# Patient Record
Sex: FEMALE
[De-identification: names, ages, dates, MRNs, and addresses within clinical notes are randomized per-mention and may not be internally consistent; named-entity substitution may affect disease eponyms.]

---

## 2018-09-22 NOTE — DIAGNOSTIC IMAGING REPORT
Exam:  Ankle and foot 3 views each



History:  Pain, fall



Comparison: None.



Findings: Cortical avulsion of the lateral distal fibula. The malleolus distal

tip avulsion.  Mature hindfoot fusion. Joint spaces preserved. No abnormal soft

tissue calcification or soft tissue defect.  



Impression:



Cortical avulsion of the lateral distal fibula may be reflective of superior

peroneal retinaculum injury.



Medial malleolus distal tip avulsion



Signed by: Dr. Andrew Palisch, M.D. on 9/22/2018 12:13 PM

## 2018-09-22 NOTE — DIAGNOSTIC IMAGING REPORT
Exam:  Right hip 2 views and AP pelvis



History:  Pain, fall



Comparison: None.



Findings: No fracture or malalignment. Joint spaces preserved. No abnormal soft

tissue calcification or soft tissue defect.  



Impression:



No acute osseous abnormality





Signed by: Dr. Andrew Palisch, M.D. on 9/22/2018 12:08 PM

## 2018-09-22 NOTE — DIAGNOSTIC IMAGING REPORT
Exam:  Right knee 3 views



History:  Pain, fall



Comparison: None.



Findings: No fracture or malalignment. Joint spaces preserved. 



Impression:



No acute osseous abnormality





Signed by: Dr. Andrew Palisch, M.D. on 9/22/2018 12:09 PM

## 2019-12-17 NOTE — DIAGNOSTIC IMAGING REPORT
EXAMINATION:  CHEST SINGLE (NOT PORTABLE)    



INDICATION: Chest pain.



COMPARISON:  None

     

FINDINGS:

TUBES and LINES:  None.



LUNGS:  Lungs are well inflated.  Lungs are clear.   There is no evidence of

pneumonia or pulmonary edema.



PLEURA:  No pleural effusion or pneumothorax.



HEART AND MEDIASTINUM:  The cardiomediastinal silhouette is unremarkable.    



BONES AND SOFT TISSUES:  No acute osseous lesion.  Status post right mastectomy

and axillary lymph node dissection. Degenerative changes of the SI joint

bilaterally.



UPPER ABDOMEN: No free air under the diaphragm.    



IMPRESSION: 

No acute thoracic abnormality.





Signed by: Dr. DASH Marie M.D. on 12/17/2019 2:41 PM

## 2019-12-18 NOTE — DISCHARGE SUMMARY
FINAL DIAGNOSES:  Atypical chest pain associated with panic attack, anxiety disorder,

and stress. 

 

SUMMARY:  The patient is a pleasant 64-year-old female with disabled, polio syndrome,

multiple lower extremities surgery when she was young, able to ambulate, but more so

recently, the patient was found to have recurrent abnormality of her previous right

breast cancer lumpectomy back in 2004.  The patient also need to have some sort of

examination of her uterus.  She is followed up by MD Grace.  The patient because of

__________, she was having more stress and having some symptoms consistent with panic

disorder.  Her lab work was otherwise unremarkable.  The patient is otherwise stable.

The chest x-ray is otherwise unremarkable.  The cardiac enzyme has been negative.  The

patient's lab work otherwise unremarkable.  She is stable.  Her lipase was 46 and LDL

was 115.  Cardiac enzymes negative and the patient is wanting to go home and I agreed.

The patient will discharge home with Xanax 0.25 mg q.6 as 

needed for chest pain.  She may follow up with MD Grace for any further workup.  The

patient is stable and she want to go home and I do agree.  The patient will discharge

home today. 

 

 

 

 

______________________________

MD JOSIANE Eden/BATOOL

D:  12/18/2019 08:32:28

T:  12/18/2019 13:00:47

Job #:  [id number]

## 2019-12-18 NOTE — PROGRESS NOTE
DATE:  12/18/2019

 

Cardiology Progress Note 

 

SUBJECTIVE:  Denies any chest pain, shortness of breath, or palpitations.  Feels
good

this morning and is requesting discharge. 

 

OBJECTIVE:  Vital signs are reviewed and stable

Gen- Alert

Neck No JVD

Chest CTAB

CV rrr normal S1/s2, no s3/s4

Abd S, BS+

Ext- No edema, warm.

 

MEDICATIONS:  Metoprolol 25 mg every 12 hours and aspirin 81 mg daily.

 

STUDIES:  Reviewed.  Potassium 3.8, creatinine 0.5.  White blood cells 5.9, 
hemoglobin

12, and platelets 251. 

 

ASSESSMENT AND PLAN:  A 64-year-old woman presents with atypical chest pain,

palpitations, labile blood pressure, chronic dyspnea.  Overnight, she has done 
well.  No 

arrhythmias noted on tele.  Okay to discharge with outpatient followup for

echocardiogram and stress test.  Continue current cardiovascular medications. 

 

 

 

______________________________

MD ANEUDY Miner/BATOOL

D:  12/18/2019 09:55:27

T:  12/18/2019 11:24:17

Job #:  [id number]

 

MTDD

## 2019-12-18 NOTE — CONSULTATION
DATE OF CONSULTATION:  12/17/2019

 

Cardiology Consultation 

 

CONSULTING PHYSICIAN:  Hernan Frost MD, Interventional Cardiology.

 

REASON FOR CONSULTATION:  Uncontrolled blood pressure.

 

HISTORY OF PRESENT ILLNESS:  Ms. Landis is a 64-year-old woman with hypertension,

dyslipidemia, morbid obesity, who presents via the ER with complaints of elevated blood

pressure reads when she was [facility] Elliot Nina.  She reports she took medications for blood pressure optimization and was

seen in the ER.  She denies any syncope, palpitations, but does endorse mild dyspnea on

exertion to strenuous activity ongoing and stable for the last several years.  She is

overall sedentary.  She did notice minimal amount of chest discomfort in the last

several weeks.  This discomfort does not seem to be affected by exertion, composition

meals, or inspiration, and is reported as constant.  There are no associated complaints. 

 

REVIEW OF SYSTEMS:

A 12-system review is negative except for as noted above.

 

ALLERGIES:  TO SULFA AND NAPROXEN.

 

PAST MEDICAL HISTORY:  Significant for hypertension and dyslipidemia.

 

SURGICAL HISTORY:  Remarkable for appendectomy.

 

SOCIAL HISTORY:  The patient does endorse history of smoking, but denies alcohol or drug

use. 

 

FAMILY HISTORY:  Noncontributory.

 

PHYSICAL EXAMINATION:

VITAL SIGNS:  Temperature 97.4, heart rate 71, respiratory rate 18, blood pressure

109/65, and O2 saturation 96%. 

GENERAL:  In no acute distress, alert, active. 

NECK:  No JVD.  No carotid bruits. 

CHEST:  Clear to auscultation. 

CARDIOVASCULAR:  Regular rate and rhythm.  Normal S1 and S2.  No S3, no S4.  No murmurs,

no rubs. 

ABDOMEN:  Soft, nontender.  Bowel sounds positive. 

EXTREMITIES:  No cyanosis, clubbing, or edema.

MEDICATIONS:  The patient is currently on metoprolol tartrate 25 mg every 12 hours and

aspirin 81 mg daily. 

 

STUDIES:  Have been reviewed.  Troponin I is 0.006 and BNP is 28.5.  Creatinine 0.8,

potassium 3.5, bicarbonate 23.  Hemoglobin 12.5, white blood cells 5.7, and platelets

281. 

 

ASSESSMENT AND PLAN:  

1. A 64-year-old woman presents with atypical symptoms of chest discomfort.

2. Chronic mild dyspnea on exertion to the setting of sedentary lifestyle, obesity,

history of smoking, hypertension, and dyslipidemia. 

3. Labile blood pressure with recent elevated beats, now low normal blood pressure

reads.  Recommend observation overnight to monitor blood pressure.  She did have some

systolic reach to 90s earlier today and therefore monitoring. 

 

DICTATION ENDS HERE

 

 

 

 

______________________________

MD ANEUDY Miner/BATOOL

D:  12/17/2019 20:46:56

T:  12/17/2019 23:33:53

Job #:  [id number]

## 2020-04-28 NOTE — XMS REPORT
Patient Summary Document

                             Created on: 2020



JULIO YOUNG

External Reference #: [id number]

: 1955

Sex: Female



Demographics





                          Address                   [address]

[place]  [address]

 

                          Home Phone                [phone]

 

                          Preferred Language        Unknown

 

                          Marital Status            Unknown

 

                          Christian Affiliation     Unknown

 

                          Race                      Unknown

 

                          Additional Race(s)        Other







 

                          Ethnic Group              Unknown





Author





                          Author                    [facility]

 

                          Organization              [facility]

 

                          Address                   Unknown

 

                          Phone                     Unavailable







Support





                Name            Relationship    Address         Phone

 

                    JOSE PRATT, TONIE PUTNAM Caregiver           [address]

[place]  [address]                 Unavailable

 

                NONSTAFF        Caregiver       Unknown         Unavailable

 

                    CARMEN TSAI     Caregiver           [facility]

Unknown                                 Unavailable

 

                    ANABELL YOUNG   Next Of Kin         [address] 

[place]  [address]                     [phone]

 

                    MYRA CLEMENTS    Caregiver           [address]

[address]

[place], [place]  [address] [phone]

 

                    DOREEN BARGER MD     Caregiver           [address] [place]  [address]                     [phone]

 

                    KATRIN, (SISTER) CURT PRS                 [address]

[place]  [address]                      [phone]

 

                    MICHELLE PICKETT      PRS                 [id number]

[place]  [address]                      [phone]

 

                    HANNAH  EDWIN     PRS                 [address]

[place]  [address]                      [phone]







Care Team Providers





                    Care Team Member Name Role                Phone

 

                    NONSTAFF            PP                  Unavailable

 

                    MYRA CLEMENTS    Unavailable         Unavailable

 

                    TONIE GARCIA  Unavailable         Unavailable







Payers





             Payer Name   Policy Type  Policy Number Effective Date Expiration D

ate

 

             Medicare A & B              [id number]  2002-10-01 00:00:00  

 

             Medicare A & B              [id number]   2002-10-01 00:00:00  







Problems





           Condition Name Condition Details Condition Category Status     Onset 

Date Resolution

Date                Last Treatment Date Treating Clinician  Comments

 

        Chest pain Chest pain Problem Active                                   

 

        Dyspnea Dyspnea Problem Active                                   

 

        Hypertension Hypertension Problem Active                                

   

 

        Weakness Weakness Problem Active                                   







Allergies, Adverse Reactions, Alerts





        Allergy Name Allergy Type Status  Severity Reaction(s) Onset Date Inacti

ve Date 

Treating Clinician                      Comments

 

           Sulfa (Sulfonamide Antibiotics) Allergy to Substance Active     Sever

e     HIVES      

2018 00:00:00                                          

 

        Sulfa (Sulfonamide Antibiotics) DA      Active  MI               00:00:00                  

 

             Naproxen     Allergy to Substance Active       Severe       TAKING 

ATENOLOL, REFUSES TO TAKE 

NAPROSYN        2014 00:00:00                                  







Medications





             Ordered Medication Name Filled Medication Name Start Date   Stop Da

te    Current 

Medication? Ordering Clinician Indication Dosage     Frequency  Signature (SIG) 

Comments                                Components

 

                          Acetaminophen With Codeine (Tylenol With Codeine #3 Ta

blet) 1 Each Tablet 

Acetaminophen With Codeine (Tylenol With Codeine #3 Tablet) 1 Each Tablet 

2018 00:00:00           Yes       Mario Tran Np           300       

          Every 4 Hours as needed 

for Pain                                             







Procedures and Interventions





                    Procedure           Date / Time Performed Performing Clinici

an

 

                    X-ray of chest, single view 2019 00:00:00 MANISH CLEMENTS







Encounters





             Start Date/Time End Date/Time Encounter Type Admission Type Attendi

[facility]       Care Department     Encounter ID

 

        2019 09:01:59 2019 09:01:59 Outpatient                 [facility]     [id number]

 

           2019 14:19:00 2019 13:46:00 Discharged Inpatient 1       

   MYRA CLEMENTS 

[facility]                    [id number]

 

        2019 09:58:11 2019 09:58:11 Outpatient                 [facility]     [id number]

 

        2019 00:00:00 2019 00:00:00 Outpatient                 [facility]     [id number]

 

        2019-10-29 00:00:00 2019-10-29 00:00:00 Outpatient                 [facility]     [id number]

 

        2019-10-16 08:37:13 2019-10-16 08:37:13 Outpatient                 [facility]     [id number]

 

        2019-10-08 09:18:04 2019-10-08 09:18:04 Outpatient                 [facility]     [id number]

 

        2019-10-08 00:00:00 2019-10-08 00:00:00 Outpatient                 [facility]     [id number]

 

        2019-10-03 10:08:25 2019-10-03 10:08:25 Outpatient                 [facility]     [id number]

 

        2019-10-03 00:00:00 2019-10-03 00:00:00 Outpatient                 [facility]     [id number]

 

        2019-10-03 00:00:00 2019-10-03 00:00:00 Outpatient                 [facility]     [id number]

 

        2019 00:00:00 2019 00:00:00 Outpatient                 [facility]     [id number]

 

        2019 12:09:06 2019 12:09:06 Outpatient                 [facility]     [id number]

 

        2019 10:50:32 2019 10:50:32 Outpatient                 [facility]     [id number]

 

        2019 00:00:00 2019 00:00:00 Outpatient                 [facility]     [id number]

 

        2019 00:00:00 2019 00:00:00 Outpatient                 [facility]     [id number]

 

        2019 00:00:00 2019 00:00:00 Outpatient                 [facility]     [id number]

 

        2019-01-15 08:52:48 2019-01-15 08:52:48 Outpatient                 [facility]     [id number]

 

        2018 09:09:56 2018 09:09:56 Outpatient                 [facility]     [id number]

 

        2018-10-29 00:00:00 2018-10-29 00:00:00 Outpatient                 [facility]     [id number]

 

        2018-10-19 00:00:00 2018-10-19 00:00:00 Outpatient                 [facility]     [id number]

 

        2018-10-15 10:15:29 2018-10-15 10:15:29 Outpatient                 [facility]     [id number]

 

             2018 11:19:00 2018 13:30:00 Departed Emergency Room 1  

          INDY GARCIA            [facility]              [id number]

 

        2018 00:00:00 2018 00:00:00 Outpatient                 [facility]     [id number]

 

        2018-09-10 00:00:00 2018-09-10 00:00:00 Outpatient                 [facility]     [id number]

 

        2018 00:00:00 2018 00:00:00 Outpatient                 [facility]     [id number]

 

        2018 00:00:00 2018 00:00:00 Outpatient                 [facility]     [id number]

 

        2018 00:00:00 2018 00:00:00 Outpatient                 [facility]     [id number]

 

        2018 09:38:22 2018 09:38:22 Outpatient                 [facility]     [id number]

 

        2018 10:10:47 2018 10:10:47 Outpatient                 [facility]     [id number]

 

        2018 08:36:48 2018 08:36:48 Outpatient                 [facility]     [id number]

 

        2018 10:54:33 2018 10:54:33 Outpatient                 [facility]     [id number]

 

        2018 00:00:00 2018 00:00:00 Outpatient                 [facility]     [id number]

 

        2018 12:09:28 2018 12:09:28 Outpatient                 [facility]     [id number]

 

        2018 00:00:00 2018 00:00:00 Outpatient                 [facility]     [id number]

 

        2018 14:44:22 2018 14:44:22 Outpatient                 [facility]     [id number]

 

        2018 00:00:00 2018 00:00:00 Outpatient                 [facility]     [id number]

 

        2018 00:00:00 2018 00:00:00 Outpatient                 [facility]     [id number]

 

        2017-10-19 00:00:00 2017-10-19 00:00:00 Outpatient                 [facility]     [id number]

 

        2017-08-10 09:17:59 2017-08-10 09:17:59 Outpatient                 [facility]     [id number]

 

        2017 08:13:37 2017 08:13:37 Outpatient                 [facility]     [id number]

 

        2017 09:23:15 2017 09:23:15 Outpatient                 [facility]     [id number]







Results





           Test Description Test Time  Test Comments Text Results Atomic Results

 Result 

Comments

 

                COMPREHENSIVE METABOLIC PANEL 2020 15:23:00               

    

 

   

 

                SODIUM (test code = NA) 143 mmol/L      136-145          

 

                POTASSIUM (test code = K) 3.5 mmol/L      3.5-5.1          

 

                CHLORIDE (test code = CL) 103 mmol/L      101-109          

 

                CARBON DIOXIDE (test code = CO2) 25.1 mmol/L     21-32          

  

 

                ANION GAP (test code = GAP) 18 mmol/L       10-20            

 

                GLUCOSE (test code = GLU) 308 mg/dL                  

 

                BLOOD UREA NITROGEN (test code = BUN) 16 mg/dL        3-21      

       

 

                CREATININE (test code = CREAT) 0.75 mg/dL      0.55-1.3         

 

                BUN/CREATININE RATIO (test code = BUN/CREA) 21.3            10-2

0            

 

                TOTAL PROTEIN (test code = PROT) 7.3 g/dL        6.5-8.4        

  

 

                ALBUMIN (test code = ALB) 3.3 g/dL        3.4-4.8          

 

                GLOBULIN (test code = GLOB) 4.0 G/DL        1-10             

 

                ALBUMIN/GLOBULIN RATIO (test code = A/G) 0.83 RATIO      0.75-1.

50        

 

                CALCIUM (test code = CA) 9.4 mg/dL       8.4-10.2         

 

                BILIRUBIN TOTAL (test code = BILT) 0.80 mg/dL      0.0-1.0      

    

 

                SGOT/AST (test code = AST) 28 U/L          6-32             

 

                SGPT/ALT (test code = ALT) 36 U/L          12-78           Note:

 Change in REFERENCE RANGE due to 

new reagent method.

 

                ALKALINE PHOSPHATASE TOTAL (test code = ALKP) 82 U/L          38

-126           





[id number] 15:23:00* 



                Test Item       Value           Reference Range Comments

 

                LIPASE (test code = LIP) 95 U/L          128-270          





COMPREHENSIVE METABOLIC [id number] 15:21:00* 



                Test Item       Value           Reference Range Comments

 

                SODIUM (test code = NA) 143 mmol/L      136-145          

 

                POTASSIUM (test code = K) 3.5 mmol/L      3.5-5.1          

 

                CHLORIDE (test code = CL) 103 mmol/L      101-109          

 

                CARBON DIOXIDE (test code = CO2) 25.1 mmol/L     21-32          

  

 

                ANION GAP (test code = GAP) 18 mmol/L       10-20            

 

                GLUCOSE (test code = GLU) 308 mg/dL                  

 

                BLOOD UREA NITROGEN (test code = BUN) 16 mg/dL        3-21      

       

 

                CREATININE (test code = CREAT) 0.75 mg/dL      0.55-1.3         

 

                BUN/CREATININE RATIO (test code = BUN/CREA) 21.3            10-2

0            

 

                TOTAL PROTEIN (test code = PROT)  gram/dL        6.4-8.2        

  

 

                ALBUMIN (test code = ALB)  g/dL           3.4-5.0          

 

                GLOBULIN (test code = GLOB)  g/dL           2.7-4.2          

 

                ALBUMIN/GLOBULIN RATIO (test code = A/G)                 0.75-1.

50        

 

                CALCIUM (test code = CA) 9.4 mg/dL       8.4-10.2         

 

                BILIRUBIN TOTAL (test code = BILT)  mg/dL          0.2-1.2      

    

 

                SGOT/AST (test code = AST)  IUnit/L        15-37            

 

                SGPT/ALT (test code = ALT)  U/L            10-69            

 

                ALKALINE PHOSPHATASE TOTAL (test code = ALKP)  IUnit/L        45

-117           





[id number] 15:21:00* 



                Test Item       Value           Reference Range Comments

 

                LIPASE (test code = LIP)  Unit/L         144-286          





- CT L-SPINE W/O [id number] 15:11:00  Name: JULIO YOUNG             
     [facility]     : 1955 Age/S: 64  / F         
[address]           Unit #: [id number]     Loc:               
[place] [address]                Phys: Emily Jones MD                       
                           Acct: [id number]  Dis Date:               Status: 
REG ER                                  PHONE #: [phone]     Exam Date: 
2020  9117                     FAX #: [phone]      Reason: L hip and 
L back pain, s/p fall                     EXAMS:                                
              CPT CODE:      [id number] CT L-SPINE W/O CONTRAST                  
 [address]                    EXAM: CT of the lumbar spine without contrast;        
      INFORMATION: Lower back pain after fall;               TECHNIQUE AND 
FINDINGS:       CT dose reduction protocol;       2.5 mm axial scans; sagittal 
and coronal reconstructions.       There is good alignment of the lumbar spine. 
     Diffuse osteoporosis.       There is moderate wedge-shaped deformity of the
L1 vertebral body with       cortical disruption along the superior endplate and
the anterior       aspect of the vertebral body and with 20% height reduction 
anteriorly.       The remainder of the lumbar vertebrae as well as T11 and 12 
are       intact.       There is significant narrowing of disc spaces T10-T11 
and to a lesser       degree L1/2 and L3/4.       Advanced degenerative changes 
of facet joints in the lower lumbar       spine with intra-articular vacuum 
phenomenon.       Paravertebral soft tissues are unremarkable.       Calcified 
plaques in the abdominal aorta and iliac arteries.       Large right renal 
calculus as described before.                 IMPRESSION:         1.  Diffuse 
osteoporosis and wedge-shaped compression fracture of L1         which is 
probably acute to subacute.  Recommend clinical correlation         and consi
deration for an MRI scan of the thoracolumbar spine.         2.  Advanced degene
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
e disc disease of the lumbar spine.         3.  Advanced facet joint arthropathy
in the lower lumbar spine.                   Location code: [facility]                  
** Electronically Signed by CLEESTE Calix **           **             on 
2020 at 1511             **                      Reported and signed by: 
Bernardo Calix M.D.       CC: Emily Jones MD; Carmen Tsai M.D.        
                                                                                
      Technologist:ANGEL MCINTYRE, RT(R),CT       CTDI:        DLP:        T
rnscb Date/Time: 2020 () t.SDR.GRW                        Orig Print D
/T: S: 2020 (413)      PAGE  1                       Signed Report       
                       - CT L-SPINE W/O [id number] 15:11:00  Name: 
JULIO YOUNG [facility]     : 
1955 Age/S: 64  / F         [address]           Unit #: [id number]
[id number]     Loc:               [place] [address]                Phys: Mike Jones MD                                                   Acct: [id number]  Di
s Date:               Status: [facility] ER                                  PHONE #: [phone]
[phone]     Exam Date: 2020  9742                     FAX #: [phone]
[phone]      Reason: L hip and L back pain, s/p fall                     EXAMS:     
                                         CPT CODE:      [id number] CT L-SPINE W/O
CONTRAST                    [address]                    EXAM: CT of the lumbar sp
ine without contrast;               INFORMATION: Lower back pain after fall;    
          TECHNIQUE AND FINDINGS:       CT dose reduction protocol;       2.5 mm
axial scans; sagittal and coronal reconstructions.       There is good alignment
of the lumbar spine.       Diffuse osteoporosis.       There is moderate wedge-
shaped deformity of the L1 vertebral body with       cortical disruption along 
the superior endplate and the anterior       aspect of the vertebral body and w
ith 20% height reduction anteriorly.       The remainder of the lumbar vertebrae
as well as T11 and 12 are       intact.       There is significant narrowing of 
disc spaces T10-T11 and to a lesser       degree L1/2 and L3/4.       Advanced 
degenerative changes of facet joints in the lower lumbar       spine with intra-
articular vacuum phenomenon.       Paravertebral soft tissues are unremarkable. 
     Calcified plaques in the abdominal aorta and iliac arteries.       Large r
ight renal calculus as described before.                 IMPRESSION:         1. 
Diffuse osteoporosis and wedge-shaped compression fracture of L1         which 
is probably acute to subacute.  Recommend clinical correlation         and consi
deration for an MRI scan of the thoracolumbar spine.         2.  Advanced degene
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
e disc disease of the lumbar spine.         3.  Advanced facet joint arthropathy
in the lower lumbar spine.                   Location code: [facility]                  
** Electronically Signed by CELESTE Calix **           **             on 
2020 at 1511             **                      Reported and signed by: 
Bernardo Calix M.D.       CC: Emily Jones MD; Carmen Tsai M.D.        
                                                                                
      Technologist:ANGEL MCINTYRE, RT(R),CT       CTDI:        DLP:        T
rnscb Date/Time: 2020 () [email or web address].GRW                        Orig Print D
/T: S: 2020 (2836)      PAGE  1                       Signed Report       
                       CBC W/AUTO [id number] 15:07:00* 



                Test Item       Value           Reference Range Comments

 

                WHITE BLOOD CELL (test code = WBC) 16.9 K/mm3      4.5-12.5     

    

 

                RED BLOOD CELL (test code = RBC) 4.29 mill/mm3   3.7-5.2        

  

 

                HEMOGLOBIN (test code = HGB) 13.4 gram/dL    11.5-15.5        

 

                HEMATOCRIT (test code = HCT) 41.6 %          36.0-46.0        

 

                MEAN CELL VOLUME (test code = MCV) 97.0 fL         80-98        

    

 

                MEAN CELL HGB (test code = MCH) 31.2 picogram   27.0-33.0       

 

 

                MEAN CELL HGB CONCETRATION (test code = MCHC) 32.2 gram/dL    33

.0-36.0        

 

                RED CELL DISTRIBUTION WIDTH (test code = RDW) 13.2 %          11

.6-16.2        

 

                RED CELL DISTRIBUTION WIDTH SD (test code = RDW-SD) 48.0 fL     

    37.0-51.0        

 

                PLATELET COUNT (test code = PLT) 246 K/mm3       150-450        

  

 

                MEAN PLATELET VOLUME (test code = MPV) 9.3 fL          6.7-11.0 

        

 

                NEUTROPHIL % (test code = NT%) 87.9 %          39.0-69.0        

 

                LYMPHOCYTE % (test code = LY%) 5.0 %           25.0-55.0        

 

                MONOCYTE % (test code = MO%) 6.3 %           0.0-10.0         

 

                EOSINOPHIL % (test code = EO%) 0.0 %           0.0-5.0          

 

                BASOPHIL % (test code = BA%) 0.4 %           0.0-1.0          

 

                NEUTROPHIL # (test code = NT#) 14.83 K/mm3     1.8-7.7          

 

                LYMPHOCYTE # (test code = LY#) 0.85 K/mm3      1.0-5.0          

 

                MONOCYTE # (test code = MO#) 1.07 K/mm3      0-0.8            

 

                EOSINOPHIL # (test code = EO#) 0.00 K/mm3      0.0-0.5          

 

                BASOPHIL # (test code = BA#) 0.06 K/mm3      0.0-0.2          

 

                MANUAL DIFF REQUIRED (test code = MDIFF) NO                     

          





- CT ABD PELVIS W/O [id number] 15:07:00  Name: JULIO YOUNG              
    [facility]     : 1955 Age/S: 64  / F         
[address]           Unit #: [id number]     Loc:               
[place] [address]                Phys: Emily Jones MD                       
                           Acct: [id number]  Dis Date:               Status: 
REG ER                                  PHONE #: [phone]     Exam Date: 
2020  5099                     FAX #: [phone]      Reason: L hip and 
L back pain, s/p fall                     EXAMS:                                
              CPT CODE:      [id number] CT ABD PELVIS W/O CONT                   
 [address]20                    EXAM: CT of the abdomen and pelvis without contrast;  
            INFORMATION: Left hip and left back pain after fall;               
TECHNIQUE AND FINDINGS:       CT dose reduction protocol;       5 mm cuts 
through the abdomen and pelvis without contrast.       Liver shows decreased 
attenuation; no focal lesions.       No abnormalities of the biliary system.    
  Atrophic pancreas.       Spleen and adrenal glands unremarkable.       A 
large, densely calcified stone is seen in the lower portion of the       right 
renal collecting system.  It measures 10 mm in diameter and 440       Hounsfield
units in density.  There is also a small parenchymal       calcification 
posteriorly in the right kidney and there is localized       parenchymal atrophy
nearby.       A 2 cm cyst is seen along the anterior aspect of the right kidney.
      The left kidney is of normal size and shape; no hydronephrosis or       
stones.       There is dilatation of the stomach.  Otherwise, no bowel       
abnormalities.       No pelvic mass lesions.       Sagittal reconstructions of 
the lumbar spine show cortical       irregularity along the anterior aspect of 
the L1 vertebral body with       20% height reduction.       Lung bases are 
clear.                 IMPRESSION:         1.  No evidence of acute traumatic ch
anges of the abdomen or pelvis.         2.  Large nonobstructing calyceal stone 
in the right kidney,         associated with focal parenchymal atrophy/scarring 
and small         parenchymal calcification.         3.  Gastric distention; cau
se is not clear.         4.  Wedge-shaped compression fracture of L1.  This may 
be acute or         subacute.  Recommend correlation with MRI.                  
Location code: [facility]                   ** Electronically Signed by CELESTE ocasio **           **             on 2020 at 150             **          
           Reported and signed by: Bernardo Calix M.D.        PAGE  1         
             Signed Report                    (CONTINUED)   Name: TARAN YOUNG                   [facility]     : 1955 Age/S: 
64  / F         [address] [facility]           Unit #: [id number]     Loc:     
         [place] [address]                Phys: Emily Jones MD              
                                    Acct: [id number]  Dis Date:               
Status: REG ER                                  PHONE #: [phone]     Exam 
Date: 2020  1455                     FAX #: [phone]      Reason: L h
ip and L back pain, s/p fall                     EXAMS:                         
                     CPT CODE:      [id number] CT ABD PELVIS W/O CONT            
        [address]               <Continued>                                       
CC: Emily Jones MD; Carmen Tsai M.D.                                    
                                                           
Technologist:ANGEL MCINTYRE, RT(R),CT       CTDI:        DLP:        Trnscb 
Date/Time: 2020 (150) t.SDR.GRW                        Orig Print D/T: S:
2020 (1350)      PAGE  2                       Signed Report              
                Creatine Kinase OW6560-78-52 07:27:00* 



                Test Item       Value           Reference Range Comments

 

                Creatine Kinase MB (test code = 50747-5) 2.60            0-5.0  

          





Troponin -00-91 07:27:00* 



                Test Item       Value           Reference Range Comments

 

                Troponin I (test code = CMI1839) 0.056           0-0.300        

  





Sodium Xtomx6498-31-21 07:21:00* 



                Test Item       Value           Reference Range Comments

 

                Sodium Level (test code = 2951-2) 138             136-145       

   





Potassium Fqffw9663-17-77 07:21:00* 



                Test Item       Value           Reference Range Comments

 

                Potassium Level (test code = 2823-3) 3.8             3.5-5.1    

      





Chloride Cjcyn2555-14-98 07:21:00* 



                Test Item       Value           Reference Range Comments

 

                Chloride Level (test code = 2075-0) 106                   

     





Carbon Dioxide Egsgn5327-49-04 07:21:00* 



                Test Item       Value           Reference Range Comments

 

                Carbon Dioxide Level (test code = 2028-9) 25              22-29 

           





Anion Xti9488-46-02 07:21:00* 



                Test Item       Value           Reference Range Comments

 

                Anion Gap (test code = 33037-3) 10.8            8-16            

 





Blood Urea Biydhhyv6867-03-64 07:21:00* 



                Test Item       Value           Reference Range Comments

 

                Blood Urea Nitrogen (test code = 3094-0) 14              7-26   

          





Zkhliitcfi5779-42-81 07:21:00* 



                Test Item       Value           Reference Range Comments

 

                Creatinine (test code = 2160-0) 0.56            0.57-1.11       

 





BUN/Creatinine Uxlou0638-41-67 07:21:00* 



                Test Item       Value           Reference Range Comments

 

                BUN/Creatinine Ratio (test code = 3097-3) 25              6-25  

           





Estimat Glomerular Filtration Glcg6210-21-64 07:21:00* 



                Test Item       Value           Reference Range Comments

 

                Estimat Glomerular Filtration Rate (test code = 115170565) > 60 

           >60              





Ranges were taken from the National Kidney Disease Education Program and the [facility] literature.Reference ranges:60 or greater: Bymquf70-90 (
for 3 consecutive months): Chronic kidney disease 15 or less: Kidney failure
Glucose Zrtev3782-65-31 07:21:00* 



                Test Item       Value           Reference Range Comments

 

                Glucose Level (test code = LGO9189) 91                    

     





Calcium Mkyjf0230-34-62 07:21:00* 



                Test Item       Value           Reference Range Comments

 

                Calcium Level (test code = 04182-1) 9.0             8.4-10.2    

     





Phosphorus Snvfm2335-60-04 07:21:00* 



                Test Item       Value           Reference Range Comments

 

                Phosphorus Level (test code = MZU3537) 3.0             2.3-4.7  

        





Magnesium Gytfv2812-02-28 07:21:00* 



                Test Item       Value           Reference Range Comments

 

                Magnesium Level (test code = 32901-2) 1.6             1.3-2.1   

       





Total Flpriijyg7411-58-07 07:21:00* 



                Test Item       Value           Reference Range Comments

 

                Total Bilirubin (test code = 1975-2) 0.4             0.2-1.2    

      





Aspartate Amino Transf (AST/SGOT)2019 07:21:00* 



                Test Item       Value           Reference Range Comments

 

                                        Aspartate Amino Transf (AST/SGOT) (test 

code = Aspartate Amino Transf 

(AST/SGOT))         21                  5-34                 





Alanine Aminotransferase (ALT/SGPT)2019 07:21:00* 



                Test Item       Value           Reference Range Comments

 

                Alanine Aminotransferase (ALT/SGPT) (test code = 1742-6) 23     

         0-55             





Total Czhygvp0084-95-64 07:21:00* 



                Test Item       Value           Reference Range Comments

 

                Total Protein (test code = 2885-2) 5.9             6.5-8.1      

    





Twaboue4825-80-19 07:21:00* 



                Test Item       Value           Reference Range Comments

 

                Albumin (test code = 1751-7) 3.3             3.5-5.0          





Ijnphlpa8278-79-29 07:21:00* 



                Test Item       Value           Reference Range Comments

 

                Globulin (test code = 93682-0) 2.6             2.3-3.5          





Albumin/Globulin Swzxi9046-89-53 07:21:00* 



                Test Item       Value           Reference Range Comments

 

                Albumin/Globulin Ratio (test code = 1759-0) 1.3             0.8-

2.0          





Alkaline Csdmlmsfjqn5074-73-90 07:21:00* 



                Test Item       Value           Reference Range Comments

 

                Alkaline Phosphatase (test code = 6768-6) 67              

           





Triglycerides Wrwnh3991-31-66 07:21:00* 



                Test Item       Value           Reference Range Comments

 

                Triglycerides Level (test code = 2571-8) 95              0-149  

          





Cholesterol Tgkqa0403-14-83 07:21:00* 



                Test Item       Value           Reference Range Comments

 

                Cholesterol Level (test code = 2093-3) 182             0-199    

        





Less than 200 mg/dL       Low Jzrb869 - 239 mg/dL           Borderline Nakq011 m
g/dl and greater     High Risk                        LDL Xfueqgkrulq3240-17-89 
07:21:00* 



                Test Item       Value           Reference Range Comments

 

                LDL Cholesterol (test code = 2089-1) 115                  

      





HDL Teltchuorem8251-00-45 07:21:00* 



                Test Item       Value           Reference Range Comments

 

                HDL Cholesterol (test code = 2085-9) 48              40-60      

      





Cholesterol/HDL Incgl3601-46-29 07:21:00* 



                Test Item       Value           Reference Range Comments

 

                Cholesterol/HDL Ratio (test code = 9830-1) 3.8             3.0-3

.6          





Creatine Hgzcmh3481-54-28 07:03:00* 



                Test Item       Value           Reference Range Comments

 

                Creatine Kinase (test code = 2157-6) 123                  

      





White Blood Civyp3174-59-15 06:30:00* 



                Test Item       Value           Reference Range Comments

 

                White Blood Count (test code = 6690-2) 5.94            4.8-10.8 

        





Red Blood Xtyqb4438-94-16 06:30:00* 



                Test Item       Value           Reference Range Comments

 

                Red Blood Count (test code = 789-8) 3.83            3.6-5.1     

     





Xxrwjtchpt7161-80-99 06:30:00* 



                Test Item       Value           Reference Range Comments

 

                Hemoglobin (test code = 46713-0) 12.2            12.0-16.0      

  





Jnxfoaykur3273-12-13 06:30:00* 



                Test Item       Value           Reference Range Comments

 

                Hematocrit (test code = 4544-3) 36.8            34.2-44.1       

 





Mean Corpuscular Cwyzao9848-66-49 06:30:00* 



                Test Item       Value           Reference Range Comments

 

                Mean Corpuscular Volume (test code = 787-2) 96.1            81-9

9            





Mean Corpuscular Ievcxjkflg7875-79-15 06:30:00* 



                Test Item       Value           Reference Range Comments

 

                Mean Corpuscular Hemoglobin (test code = 785-6) 31.9            

28-32            





Mean Corpuscular Hemoglobin Lnpsdde6224-38-79 06:30:00* 



                Test Item       Value           Reference Range Comments

 

                Mean Corpuscular Hemoglobin Concent (test code = 786-4) 33.2    

        31-35            





Red Cell Distribution Irjay7261-49-54 06:30:00* 



                Test Item       Value           Reference Range Comments

 

                Red Cell Distribution Width (test code = 02728-9) 13.8          

  11.7-14.4        





Platelet Jgxsz9359-19-81 06:30:00* 



                Test Item       Value           Reference Range Comments

 

                Platelet Count (test code = 777-3) 251             140-360      

    





Neutrophils (%) (Auto)2019 06:30:00* 



                Test Item       Value           Reference Range Comments

 

                Neutrophils (%) (Auto) (test code = 77500-8) 46.1            38.

7-80.0        





Lymphocytes (%) (Auto)2019 06:30:00* 



                Test Item       Value           Reference Range Comments

 

                Lymphocytes (%) (Auto) (test code = 736-9) 40.4            18.0-

39.1        





Monocytes (%) (Auto)2019 06:30:00* 



                Test Item       Value           Reference Range Comments

 

                Monocytes (%) (Auto) (test code = 5905-5) 8.6             4.4-11

.3         





Eosinophils (%) (Auto)2019 06:30:00* 



                Test Item       Value           Reference Range Comments

 

                Eosinophils (%) (Auto) (test code = 713-8) 3.5             0.0-6

.0          





Basophils (%) (Auto)2019 06:30:00* 



                Test Item       Value           Reference Range Comments

 

                Basophils (%) (Auto) (test code = 706-2) 1.2             0.0-1.0

          





IM GRANULOCYTES %2019 06:30:00* 



                Test Item       Value           Reference Range Comments

 

                IM GRANULOCYTES % (test code = IM GRANULOCYTES %) 0.2           

  0.0-1.0          





Neutrophils # (Auto)2019 06:30:00* 



                Test Item       Value           Reference Range Comments

 

                Neutrophils # (Auto) (test code = 751-8) 2.7             2.1-6.9

          





Lymphocytes # (Auto)2019 06:30:00* 



                Test Item       Value           Reference Range Comments

 

                Lymphocytes # (Auto) (test code = 79525-1) 2.4             1.0-3

.2          





Monocytes # (Auto)2019 06:30:00* 



                Test Item       Value           Reference Range Comments

 

                Monocytes # (Auto) (test code = 742-7) 0.5             0.2-0.8  

        





Eosinophils # (Auto)2019 06:30:00* 



                Test Item       Value           Reference Range Comments

 

                Eosinophils # (Auto) (test code = 711-2) 0.2             0.0-0.4

          





Basophils # (Auto)2019 06:30:00* 



                Test Item       Value           Reference Range Comments

 

                Basophils # (Auto) (test code = 704-7) 0.1             0.0-0.1  

        





Absolute Immature Granulocyte (floh7557-44-44 06:30:00* 



                Test Item       Value           Reference Range Comments

 

                                        Absolute Immature Granulocyte (auto (olvin

t code = Absolute Immature Granulocyte 

(auto)              0.01                0-0.1                





B-Type Natriuretic Cocrqms2809-13-17 18:29:00* 



                Test Item       Value           Reference Range Comments

 

                B-Type Natriuretic Peptide (test code = 40907-9) 28.5           

 0-100            





Wbpxtp1663-87-64 18:29:00* 



                Test Item       Value           Reference Range Comments

 

                Lipase (test code = 3040-3) 46              8-78             





Prothrombin Tyvk4104-33-48 18:03:00* 



                Test Item       Value           Reference Range Comments

 

                Prothrombin Time (test code = 5902-2) 12.9            11.9-14.5 

       





Prothromb Time International Bdhpe7233-30-13 18:03:00* 



                Test Item       Value           Reference Range Comments

 

                Prothromb Time International Ratio (test code = 6301-6) 0.92    

                         





Oral Anticoagulant Therapy INR Values:1. Low Intensity Therapy        1.5 - 2.02
. Moderate Intensity Therapy   2.0 - 3.03. High Intensity Therapy(1)    2.5 - 3.
54. High Intensity Therapy(2)    3.0 - 4.05. Panic Value INR              > 5.0
Activated Partial Thromboplast Sofz5573-69-82 18:03:00* 



                Test Item       Value           Reference Range Comments

 

                Activated Partial Thromboplast Time (test code = 08231-9) 29.2  

          23.8-35.5        





Urine FWP3259-22-28 14:57:00* 



                Test Item       Value           Reference Range Comments

 

                Urine WBC (test code = 5821-4) 6-10            0-5              





Urine RCA7757-88-10 14:57:00* 



                Test Item       Value           Reference Range Comments

 

                Urine RBC (test code = 46986-0) 6-10            0-5             

 





Urine Wedaiyaw5511-82-30 14:57:00* 



                Test Item       Value           Reference Range Comments

 

                Urine Bacteria (test code = 26534-9) RARE            NONE       

      





Urine Epithelial Sqakv8959-15-49 14:57:00* 



                Test Item       Value           Reference Range Comments

 

                Urine Epithelial Cells (test code = 78063-3) FEW             NON

E             





Urine Vaqwh2955-68-14 14:40:00* 



                Test Item       Value           Reference Range Comments

 

                Urine Color (test code = 5778-6) YELLOW          YELLOW         

  





Urine Dejkvkj6694-90-83 14:40:00* 



                Test Item       Value           Reference Range Comments

 

                Urine Clarity (test code = 84482-6) SL CLOUDY       CLEAR       

     





Urine Specific Owdkurd2082-67-96 14:40:00* 



                Test Item       Value           Reference Range Comments

 

                Urine Specific Gravity (test code = 5811-5) 1.030           1.01

0-1.025      





Urine iP5983-94-14 14:40:00* 



                Test Item       Value           Reference Range Comments

 

                Urine pH (test code = 10145-9) 6               5-7              





Urine Leukocyte Fikiggqf6070-74-46 14:40:00* 



                Test Item       Value           Reference Range Comments

 

                Urine Leukocyte Esterase (test code = 5799-2) NEGATIVE        NE

GATIVE         





Urine Orxnfeg1530-62-87 14:40:00* 



                Test Item       Value           Reference Range Comments

 

                Urine Nitrite (test code = 03111-3) NEGATIVE        NEGATIVE    

     





Urine Gwzadwb5163-54-47 14:40:00* 



                Test Item       Value           Reference Range Comments

 

                Urine Protein (test code = 5804-0) 2+              NEGATIVE     

    





Urine Glucose (UA)2019 14:40:00* 



                Test Item       Value           Reference Range Comments

 

                Urine Glucose (UA) (test code = 2349-9) NEGATIVE        NEGATIVE

         





Urine Ldnypjf2563-70-25 14:40:00* 



                Test Item       Value           Reference Range Comments

 

                Urine Ketones (test code = 17901-9) NEGATIVE        NEGATIVE    

     





Urine Xukqnfwjegwd6745-85-14 14:40:00* 



                Test Item       Value           Reference Range Comments

 

                Urine Urobilinogen (test code = 95960-4) 0.2             0.2-1  

          





Urine Ohzfpuldo5121-66-16 14:40:00* 



                Test Item       Value           Reference Range Comments

 

                Urine Bilirubin (test code = 1978-6) NEGATIVE        NEGATIVE   

      





Urine Fuiay6962-95-50 14:40:00* 



                Test Item       Value           Reference Range Comments

 

                Urine Blood (test code = 05667-2) 1+              NEGATIVE      

   





CHEST SINGLE (NOT PORTABLE)2019 14:40:00                                  
                                                   [place] [address]      Patient Name: JULIO YOUNG                                  
MR #: [id number]                     : 1955                            
      Age/Sex: 64/F  Acct #: [id number]                              Req #: 
[id number]  Adm Physician:                                                      
Ordered by: MYRA CLEMENTS MD, MD                            Report #: [id number]
       Location: ER                                      Room/Bed:              
      ___________________________________________
________________________________________________________    Procedure: [id number]
DX/CHEST SINGLE (NOT PORTABLE)  Exam Date: 19                            
Exam Time: 1416                                              REPORT STATUS: Sign
ed    EXAMINATION:  CHEST SINGLE (NOT PORTABLE)          INDICATION: Chest pain.
     COMPARISON:  None           FINDINGS:   TUBES and LINES:  None.      LUNGS:
 Lungs are well inflated.  Lungs are clear.   There is no evidence of   pneumo
juan or pulmonary edema.      PLEURA:  No pleural effusion or pneumothorax.      
HEART AND MEDIASTINUM:  The cardiomediastinal silhouette is unremarkable.       
  BONES AND SOFT TISSUES:  No acute osseous lesion.  Status post right mastecto
my   and axillary lymph node dissection. Degenerative changes of the SI joint   
bilaterally.      UPPER ABDOMEN: No free air under the diaphragm.          IMPRE
SSION:    No acute thoracic abnormality.         Signed by: Dr. DASH alegria M.D. on 2019 2:41 PM        Dictated By: ALCIDES GODWIN MD, MD  Elect
ronically Signed By: ALCIDES GODWIN MD, MD on 19 1441  Transcribed By: [facility]
RAN on 19 1441       COPY TO:   MYRA CLEMENTS         ANKLE 3 + VIEWS 
IYNMB6757-71-44 12:10:00    [place] [address]      Patient Name: JULIO YOUNG  
MR #: [id number]    : 1955 Age/Sex: 63/F  Acct #: [id number] Req #: 
[id number]  Adm Physician:     Ordered by: MARIO TRAN NP  Report #: [id number]
[id number]   Location: ER  Room/Bed:     
_______________________________________________________________________
____________________________    Procedure: [id number] DX/ANKLE 3 + VIEWS RIGHT  E
xam Date: 18                            Exam Time: 1145       REPORT STATU
S: Signed       Exam:  Ankle and foot 3 views each      History:  Pain, fall    
 Comparison: None.      Findings: Cortical avulsion of the lateral distal fibul
a. The malleolus distal   tip avulsion.  Mature hindfoot fusion. Joint spaces pr
eserved. No abnormal soft   tissue calcification or soft tissue defect.        I
mpression:      Cortical avulsion of the lateral distal fibula may be reflective
of superior   peroneal retinaculum injury.      Medial malleolus distal tip avu
lsion      Signed by: Dr. Andrew Palisch, M.D. on 2018 12:13 PM        Dict
ated By: ANDREW R PALISCH MD  Electronically Signed By: ANDREW R PALISCH MD on 0
18 1213  Transcribed By: ROMEO on 18 1213       COPY TO:   SALAS TRAN NP        FOOT RIGHT KVFDJJYU8694-14-73 12:10:00    [place] [address]      
Patient Name: JULIO YOUNG   MR #: [id number]    : 1955 Age/Sex: 
63/F  Acct #: [id number] Req #: [id number]  Adm Physician:     Ordered by: 
MARIO TRAN NP  Report #: [id number]   Location: ER  Room/Bed:     
_______________________________________________________________________
____________________________    Procedure: [id number] DX/FOOT RIGHT COMPLETE  Exa
m Date: 18                            Exam Time: 1145       REPORT STATUS:
Signed       Exam:  Ankle and foot 3 views each      History:  Pain, fall      
Comparison: None.      Findings: Cortical avulsion of the lateral distal fibula.
The malleolus distal   tip avulsion.  Mature hindfoot fusion. Joint spaces pres
erved. No abnormal soft   tissue calcification or soft tissue defect.        Imp
ression:      Cortical avulsion of the lateral distal fibula may be reflective o
f superior   peroneal retinaculum injury.      Medial malleolus distal tip avuls
ion      Signed by: Dr. Andrew Palisch, M.D. on 2018 12:13 PM        Dictat
ed By: ANDREW R PALISCH MD  Electronically Signed By: ANDREW R PALISCH MD on 1213  Transcribed By: ROMEO on 18 1213       COPY TO:   GLORIA TRAN NP        KNEE RIGHT THREE BYQHV2403-94-92 12:08:00    [place] [address]      
Patient Name: JULIO YOUNG   MR #: [id number]    : 1955 Age/Sex: 
63/F  Acct #: [id number] Req #: [id number]  [facility] Physician:     Ordered by: 
MARIO TRAN NP  Report #: [id number]   Location: ER  Room/Bed:     
_______________________________________________________________________
____________________________    Procedure: [id number] DX/KNEE RIGHT THREE VIEWS  
Exam Date: 18                            Exam Time: 1145       REPORT STAT
US: Signed       Exam:  Right knee 3 views      History:  Pain, fall      Compar
laura: None.      Findings: No fracture or malalignment. Joint spaces preserved. 
     Impression:      No acute osseous abnormality         Signed by: Dr. Andrew
Palisch, M.D. on 2018 12:09 PM        Dictated By: ANDREW R PALISCH MD  E
lectronically Signed By: ANDREW R PALISCH MD on 18  Transcribed By: PRERNA DYE on 18       COPY TO:   MARIO TRAN NP        HIP RIGHT 2-
3 VW (+/- PELVIS)2018 12:07:00    [place] [address]      Patient Name: 
JULIO YOUNG   MR #: [id number]    : 1955 Age/Sex: 63/F  Acct #: 
[id number] Req #: [id number]  Adm Physician:     Ordered by: MARIO TRAN NP  Report #: [id number]   Location: ER  Room/Bed:     
_______________________________________________________________________
____________________________    Procedure: [id number] DX/HIP RIGHT 2-3 VW (+/- PE
LVIS)  Exam Date:                             Exam Time:        REPORT STATUS: S
igned       Exam:  Right hip 2 views and AP pelvis      History:  Pain, fall    
 Comparison: None.      Findings: No fracture or malalignment. Joint spaces pre
served. No abnormal soft   tissue calcification or soft tissue defect.        Im
pression:      No acute osseous abnormality         Signed by: Dr. Mega martins M.D. on 2018 12:08 PM        Dictated By: ANDREW R PALISCH MD  [facility] Signed By: ANDREW R PALISCH MD on 18  Transcribed By: ROMEO byrne 18       COPY TO:   MARIO [id number] NP

## 2020-11-30 NOTE — XMS REPORT
Clinical Summary

                             Created on: 2020



Gianna Landis

External Reference #: [id number]

: 1955

Sex: Female



Demographics





                          Address                   [address] [place]  [address]

 

                          Home Phone                [phone]

 

                          Preferred Language        Unknown

 

                          Marital Status            

 

                          Yarsani Affiliation     CAT

 

                          Race                      Unknown

 

                          Ethnic Group              Unknown





Author





                          Author                    [facility]

ict

 

                          Organization              [facility]

ict

 

                          Address                   Unknown

 

                          Phone                     Unavailable







Support





                Name            Relationship    Address         Phone

 

                    Rolan Clifton   [employer]                [address]

[address]

[place]  [address]                      [phone]







Care Team Providers





                    Care Team Member Name Role                Phone

 

                    Elliot Nina MD PCP                 [phone]







Allergies





                                        Comments



                 Active Allergy  Reactions       Severity        Noted Date 

 

                                         



                           Naproxen                  2011 

 

                                         



                           Ondansetron               2000 

 

                                         



                           Sulfa (Sulfonamide        2011 



                                         Antibiotics)    







Medications





                          End Date                  Status



              Medication   Sig          Dispensed    Refills      Start  



                                         Date  

 

                                                    Active



              OMEGA-3 FATTY ACIDS 1,000  Take one     1 bottle     0            

200  



                     MG CAPIndications:  capsule by          9  



                           Hyperlipidemia            mouth 30     



                                         minutes     



                                         before meals     



                                         three times     



                                         daily     

 

                                                    Active



              hydrocortisone 2.5 %  Apply to     28.35 g      0              



                     ointmentIndications:  affected area       9  



                           Dermatitis                2 times     



                                         daily.     

 

                                                    Active



              pregabalin (LYRICA) 50 mg  Take 1       90 capsule   2            

  



                     capsuleIndications:  capsule by          9  



                           Fibromyalgia              mouth 3 times     



                                         daily.     

 

                                                    Active



              atenoloL (TENORMIN) 50 mg  Take 1 tablet  90 tablet    1          

    



                     tabletIndications: HTN  by mouth            0  



                           (hypertension), benign    daily.     

 

                                                    Active



              ergocalciferol (VITAMIN  Take 1       12 capsule   0              



                     D2) 1,250 mcg (50,000  capsule by          0  



                           unit) capsuleIndications:  mouth ONCE A     



                           Vitamin D deficiency      WEEK.     

 

                                                    Active



              dexlansoprazole  Take 1       90 capsule   1             

 



                     (DEXILANT) 30 mg delayed  capsule by          0  



                           release                   mouth daily.     



                                         capsuleIndications:      



                                         Gastroesophageal reflux      



                                         disease without      



                                         esophagitis      

 

                                                    Active



              fenofibrate (LOFIBRA) 160  TAKE 1 TABLET  90 tablet    1          

    



                     mg tabletIndications:  BY MOUTH            0  



                           Hyperlipidemia,           EVERY DAY FOR     



                           unspecified               CHOLESTEROL     



                                         hyperlipidemia type      

 

                                                    Active



              ROPINIRole (REQUIP) 0.25  TAKE 1 TABLET  30 tablet    1           

   



                     mg tabletIndications:  BY MOUTH            0  



                           Restless leg syndrome     EVERYDAY AT     



                                         BEDTIME     

 

                          2019                Discontinued (Therapy comple

anup)



              traMADol (ULTRAM) 50 mg  Take 1 tablet  30 tablet    0            

  



                     tabletIndications: Closed  by mouth            9  



                           fracture of right ankle,  every 6 hours     



                           sequela                   as needed for     



                                         Pain for pain     



                                         (1 tablet =     



                                         50 mg).     

 

                          2019                Discontinued (Reorder)



              fenofibrate (LOFIBRA) 160  Take 1 tablet  90 tablet    1          

    



                     mg tabletIndications:  by mouth            9  



                           Hyperlipidemia,           daily For     



                           unspecified               cholesterol.     



                                         hyperlipidemia type      

 

                          2019                Discontinued (Reorder)



              dexlansoprazole  Take 1       90 capsule   1             

 



                     (DEXILANT) 30 mg delayed  capsule by          9  



                           release                   mouth daily.     



                                         capsuleIndications:      



                                         Gastroesophageal reflux      



                                         disease without      



                                         esophagitis      

 

                          2019                Discontinued (Reorder)



              pregabalin (LYRICA) 50 mg  Take 1       90 capsule   2            

  



                     capsuleIndications:  capsule by          9  



                           Fibromyalgia              mouth 3 times     



                                         daily.     

 

                          2019                Discontinued (Reorder)



              dexlansoprazole  Take 1       90 capsule   1             

 



                     (DEXILANT) 30 mg delayed  capsule by          9  



                           release                   mouth daily.     



                                         capsuleIndications:      



                                         Gastroesophageal reflux      



                                         disease without      



                                         esophagitis      

 

                          2019                Discontinued



              ROPINIRole (REQUIP) 0.25  TAKE 1 TABLET  30 tablet    1           

 10/29/201  



                     mg tabletIndications:  BY MOUTH AT         9  



                           Restless leg syndrome     BEDTIME     



                                         NIGHTLY.     

 

                          2019                Discontinued (Dose adjustmen

t)



              atenolol (TENORMIN) 25 mg  TAKE 1 TABLET  30 tablet    3          

  10/29/201  



                     tabletIndications: HTN  BY MOUTH            9  



                           (hypertension), benign    EVERY DAY     

 

                          2020                Discontinued (Reorder)



              fenofibrate (LOFIBRA) 160  TAKE 1 TABLET  90 tablet    1          

    



                     mg tabletIndications:  BY MOUTH            9  



                           Hyperlipidemia,           DAILY FOR     



                           unspecified               CHOLESTEROL.     



                                         hyperlipidemia type      

 

                          2020                Discontinued (Reorder)



              atenolol (TENORMIN) 50 mg  Take 1 tablet  90 tablet    2          

    



                     tabletIndications: HTN  by mouth            9  



                           (hypertension), benign    daily.     

 

                          2020                Discontinued (Reorder)



              dexlansoprazole  Take 1       90 capsule   1             

 



                     (DEXILANT) 30 mg delayed  capsule by          9  



                           release                   mouth daily.     



                                         capsuleIndications:      



                                         Gastroesophageal reflux      



                                         disease without      



                                         esophagitis      

 

                          2020                Discontinued



              ROPINIRole (REQUIP) 0.25  TAKE 1 TABLET  30 tablet    1           

   



                     mg tabletIndications:  BY MOUTH AT         9  



                           Restless leg syndrome     BEDTIME     



                                         NIGHTLY.     

 

                          2020                Discontinued



              ROPINIRole (REQUIP) 0.25  TAKE 1 TABLET  30 tablet    1           

   



                     mg tabletIndications:  BY MOUTH            0  



                           Restless leg syndrome     EVERYDAY AT     



                                         BEDTIME     

 

                          2020                Discontinued



              ROPINIRole (REQUIP) 0.25  TAKE 1 TABLET  30 tablet    1           

   



                     mg tabletIndications:  BY MOUTH            0  



                           Restless leg syndrome     EVERYDAY AT     



                                         BEDTIME     

 

                          2020                Discontinued



              ROPINIRole (REQUIP) 0.25  TAKE 1 TABLET  30 tablet    1           

   



                     mg tabletIndications:  BY MOUTH            0  



                           Restless leg syndrome     EVERYDAY AT     



                                         BEDTIME     

 

                          2020                Discontinued



              ROPINIRole (REQUIP) 0.25  TAKE 1 TABLET  30 tablet    1           

   



                     mg tabletIndications:  BY MOUTH            0  



                           Restless leg syndrome     EVERYDAY AT     



                                         BEDTIME     

 

                          2020                Discontinued



              ROPINIRole (REQUIP) 0.25  TAKE 1 TABLET  30 tablet    1           

   



                     mg tabletIndications:  BY MOUTH            0  



                           Restless leg syndrome     EVERYDAY AT     



                                         BEDTIME     

 

                          2020                Discontinued



              dexlansoprazole  Take 1       90 capsule   1             

 



                     (DEXILANT) 30 mg delayed  capsule by          0  



                           release                   mouth daily.     



                                         capsuleIndications:      



                                         Gastroesophageal reflux      



                                         disease without      



                                         esophagitis      

 

                          2020                Discontinued



              fenofibrate (LOFIBRA) 160  Take 1 tablet  90 tablet    1          

    



                     mg tabletIndications:  by mouth            0  



                           Hyperlipidemia,           daily For     



                           unspecified               cholesterol.     



                                         hyperlipidemia type      

 

                          2020                Discontinued



              ROPINIRole (REQUIP) 0.25  TAKE 1 TABLET  30 tablet    1           

   



                     mg tabletIndications:  BY MOUTH            0  



                           Restless leg syndrome     EVERYDAY AT     



                                         BEDTIME     

 

                          2020                Discontinued



              ROPINIRole (REQUIP) 0.25  TAKE 1 TABLET  30 tablet    1           

   



                     mg tabletIndications:  BY MOUTH            0  



                           Restless leg syndrome     EVERYDAY AT     



                                         BEDTIME     

 

                          2020                Discontinued (Reorder)



              DEXILANT 30 mg delayed  TAKE 1       90 capsule   1              



                     release             CAPSULE BY          0  



                           capsuleIndications:       MOUTH EVERY     



                           Gastroesophageal reflux   DAY     



                                         disease without      



                                         esophagitis      

 

                          2020                Discontinued



              ROPINIRole (REQUIP) 0.25  TAKE 1 TABLET  30 tablet    1           

   



                     mg tabletIndications:  BY MOUTH            0  



                           Restless leg syndrome     EVERYDAY AT     



                                         BEDTIME     

 

                          2020                Discontinued



              ROPINIRole (REQUIP) 0.25  TAKE 1 TABLET  30 tablet    1           

   



                     mg tabletIndications:  BY MOUTH            0  



                           Restless leg syndrome     EVERYDAY AT     



                                         BEDTIME     

 

                          10/07/2020                Discontinued



              ROPINIRole (REQUIP) 0.25  TAKE 1 TABLET  30 tablet    1           

   



                     mg tabletIndications:  BY MOUTH            0  



                           Restless leg syndrome     EVERYDAY AT     



                                         BEDTIME     

 

                          10/29/2020                Discontinued



              ROPINIRole (REQUIP) 0.25  TAKE 1 TABLET  30 tablet    1           

 10/07/202  



                     mg tabletIndications:  BY MOUTH            0  



                           Restless leg syndrome     EVERYDAY AT     



                                         BEDTIME     

 

                          2020                Discontinued



              ROPINIRole (REQUIP) 0.25  TAKE 1 TABLET  30 tablet    1           

 10/29/202  



                     mg tabletIndications:  BY MOUTH            0  



                           Restless leg syndrome     EVERYDAY AT     



                                         BEDTIME     







                                        Status



            Hospital, Clinic, or  Ordered Dose  Route      Frequency  Start     

 End Date 



                           Other Facility            Date  



                                         Administered Medication      

 

                                        Ended



            cloNIDine HCl (CATAPRES)  0.1 mg     OR         ONCE       20 



                     tablet 0.1 mgIndications:     19                  9 



                                         Fibromyalgia      







Active Problems





 



                           Problem                   Noted Date

 

 



                           Fibromyalgia              2018

 

 



                           Ovarian cyst              2010

 

 



                                         Hyperlipidemia 

 

 



                                         Breast cancer 

 

 



                                         Overview:



                                         T1N1M0 invsive ductal carcinoma, S/P se

gmental mastectomy, axillary LN



                                         dissection

 

 



                                         Poliomyelitis osteopathy of lower leg 

 

 



                                         Renal calculus or stone 

 

 



                                         GERD (gastroesophageal reflux disease) 

 

 



                                         Hypertension 







Encounters





                          Care Team                 Description



                     Date                Type                Specialty  

 

                                        



Elliot Nina III, MD                Medications



                     2020          Refill              Family Practice  

 

                                        



Elliot Nina III, MD                Medications



                     2020          Refill              [facility]  

 

                                        



Elliot Nina III, MD                Medications



                     10/29/2020          Refill              [facility]  

 

                                        



Elliot Nina III, MD                Medications



                     10/07/2020          Refill              Family Practice  

 

                                        



Elliot Nina III, MD                Medications



                     2020          Refill              Family Practice  

 

                                        



Elliot Nina III, MD                HTN (hypertension), benign (Primary Dx);

 

Gastroesophageal reflux disease without esophagitis; 

Hyperlipidemia, unspecified hyperlipidemia type; 

Fibromyalgia; 

Malignant neoplasm of right female breast, unspecified estrogen receptor status,
unspecified site of breast



                     2020          Telephonic          Family Practice  



                                         Encounter   

 

                                        



Elliot Nina III, MD                Medications



                     2020          Refill              Family Practice  

 

                                        



Elliot Nina III, MD                Medications



                     2020          Refill              Family Practice  

 

                                        



Gisele Baldwin RN                  Medications



                     2020          [facility]



                     2020          Refill              [facility]  

 

                                        



Elliot Nina III, MD                Medications



                     2020          Refill              Family Practice  

 

                                        



Elliot Nina III, MD                HTN (hypertension), benign (Primary Dx);

 

Gastroesophageal reflux disease without esophagitis; 

Hyperlipidemia, unspecified hyperlipidemia type; 

Fibromyalgia; 

Malignant neoplasm of right female breast, unspecified estrogen receptor status,
unspecified site of breast; 

Closed fracture of thoracic vertebra, unspecified fracture morphology, 
unspecified thoracic vertebral level, sequela



                     2020          [facility]  



                                         Encounter   

 

                                        



Elliot Nina III, MD                Medications



                     2020          Refill              Family Practice  

 

                                        



Elliot Nina III, MD                Medications



                     2020          Refill              Family Practice  

 

                                        



Elliot Nina III, MD                Malignant neoplasm of right female breas

t, unspecified 

estrogen receptor status, unspecified site of breast (Primary Dx); 

HTN (hypertension), benign; 

Hyperlipidemia, unspecified hyperlipidemia type; 

Fibromyalgia; 

Closed fracture of thoracic vertebra, unspecified fracture morphology, 
unspecified thoracic vertebral level, sequela



                     2020          Office Visit        [facility]  

 

                                        



Elliot Nina III, MD                Medications



                     2020          [facility]                NO SHOW ENCOUNTER (Primary Dx)



                     2020          [facility]  



                                         Encounter   

 

                                        



Elliot Nina III, MD                Medications



                     2020          [facility]                Fall, sequela (Primary Dx); 

Acute low back pain, unspecified back pain laterality, unspecified whether 
sciatica present



                     2020          [facility]  



                                         Encounter   

 

                                        



Elliot Nina III, MD                NO SHOW ENCOUNTER (Primary Dx)



                     2020          Office Visit        [facility]  

 

                                        



Elliot Nina III, MD                Medications



                     04/10/2020          Refill              Family Practice  

 

                                        



Elliot Nina III, MD                Medications



                     2020          Refill              Family Practice  

 

                                        



Elliot Nina III, MD                Medications



                     2020          Refill              Family Practice  

 

                                        



Elliot Nina III, MD                Medications



                     2020          Refill              Family Practice  

 

                                        



Elliot Nina III, MD                HTN (hypertension), benign (Primary Dx);

 

Chest pain, unspecified type; 

Malignant neoplasm of right female breast, unspecified estrogen receptor status,
unspecified site of breast; 

Fibromyalgia; 

Post-menopausal bleeding; 

Anxiety state; 

Needs flu shot



                     2019          Office Visit        [facility]  

 

                                        



Elliot Nina III, MD                Medications



                     2019          Refill              Family Practice  

 

                                        



Elliot Nina III, MD                HTN (hypertension), benign (Primary Dx);

 

Malignant neoplasm of right female breast, unspecified estrogen receptor status,
unspecified site of breast; 

Tension headache; 

Gastroesophageal reflux disease without esophagitis; 

Fibromyalgia; 

Post-menopausal bleeding; 

Chest pain, unspecified type; 

Chest pressure



                     2019          Office Visit        [facility]  

 

                                        



Elliot Nina III, MD                Medications



                     2019          Refill              [facility]  



after 2019



Immunizations





  



                     Name                Administration Dates  Next Due

 

  



                     Influenza Vaccine   2013, 10/14/2009, 2008, 10/

  10/23/2007

 

  



                           Influenza Vaccine,        2018 (Deferred: [facility]

ilable-Refer to Another 



                           Seasonal, Injectable      Location) 

 

  



                           Influenza, Injectable,    2019 (Deferred: [facility]

nt Refused) 



                                         Quadrivalent  

 

  



                           Influenza,                01/15/2019, 10/15/2018 (Def

erred: Contraindication 



                           Vaccine<FLUCELVAX>(Multi-  - patient has allergies to

 eggs ) 



                                         Dose)  

 

  



                           Pneumoccoccal             2015 (Deferred: [facility]

nt Refused) 

 

  



                           Tdap (Tetanus Toxoid,     10/03/2019 



                                         Reduced Diphtheria Toxoid  



                                         And Acellular Pertussis,  



                                         Absorbed)  

 

  



                           Tdap Tetanus, diphtheria,  2009 



                                         acellular pertussis  



                                         Vaccine  







Family History





   



                 Medical History  Relation        Name            Comments

 

   



                           Cancer                    Father  

 

   



                     Diabetes            Paternal            lung



                                         Grandmother  







   



                 Relation        Name            Status          Comments

 

   



                           Father                     

 

   



                           Mother                     

 

   



                                         Paternal Grandmother   

 

   



                           Sister                    Alive 

 

   



                           Sister                    Alive 

 

   



                           Sister                    Alive 

 

   



                           Sister                    Alive 

 

   



                           Sister                    Alive 

 

   



                           Sister                    Alive 

 

   



                           Sister                    Alive 

 

   



                           Sister                     







Social History





                                        Date



                 Tobacco Use     Types           Packs/Day       Years Used 

 

                                         



                                         Current Some Day Smoker    

 

    



                                         Smokeless Tobacco: Never   



                                         Used   







                                        Tobacco Cessation: Ready to Quit: No; Co

unseling Given: Yes









                    Drinks/Week         oz/Week             Comments



                                         Alcohol Use   

 

                                                             



                                         No   







  



                     Food Insecurity     Answer              Date Recorded

 

  



                     Within the past 12 months, you worried that your  Sometimes

 true      10/15/2018



                                         food would run out before you got money

 to buy  



                                         more.  

 

  



                     Within the past 12 months, the food you bought  Sometimes t

rue      10/15/2018



                                         just didn't last and you didn't have mo

osiel to get  



                                         more.  







 



                           Sex Assigned at Birth     Date Recorded

 

 



                                         Not on file 







                                        Industry



                           Job Start Date            Occupation 

 

                                        Not on file



                           Not on file               Not on file 







                                        Travel End



                           Travel History            Travel Start 

 





                                         No recent travel history available.







Last Filed Vital Signs





                    Reading             Time Taken          Comments



                                         Vital Sign   

 

                    123/85              2020  9:23 AM CDT  



                                         Blood Pressure   

 

                    86                  2020  9:23 AM CDT  



                                         Pulse   

 

                    36.7 C (98 F)   2020  9:23 AM CDT  



                                         Temperature   

 

                    18                  2020  9:23 AM CDT  



                                         Respiratory Rate   

 

                    -                   -                    



                                         Oxygen Saturation   

 

                    -                   -                    



                                         Inhaled Oxygen   



                                         Concentration   

 

                    68.9 kg (152 lb)    2020  9:23 AM CDT  



                                         Weight   

 

                    152.4 cm (5')       2020  9:23 AM CDT  



                                         Height   

 

                    29.69               2020  9:23 AM CDT  



                                         Body Mass Index   







Plan of Treatment





   



                 Health Maintenance  Due Date        Last Done       Comments

 

   



                           HPV Cervical Cancer Scrn  1986  

 

   



                     Breast Cancer Scrn  2021 



                           (Yearly)                  (Previously 



                                         completed - 



                                         External), 



                                         10/30/2019 



                                         (Previously 



                                         completed - 



                                         External), 



                                         2018 



                                         (Previously 



                                         completed - 



                                         External), 



                                         Additional 



                                         history 



                                         exists 

 

   



                     Colonoscopy 10yr    2023 

 

   



                     Pap Cervical Cancer Scrn  2025 



                                         (Previously 



                                         completed - 



                                         External), 



                                         2017 



                                         (Previously 



                                         completed - 



                                         External), 



                                         2013, 



                                         Additional 



                                         history 



                                         exists 







Procedures





                                        Comments



                 Procedure Name  Priority        Date/Time       Associated Diag

nosis 

 

                                        



Results for this procedure are in the results section.



                 CBC             Routine         2020      HTN (hypertensi

on), 



                           10:59 AM CDT              benign 

 

                                        



Results for this procedure are in the results section.



                 VIT D, 25-HYDROXY  Routine         2020      HTN (hyperte

nsion), 



                           10:59 AM CDT              benign 

 

                                        



Results for this procedure are in the results section.



                 CALCIUM         Routine         2020      HTN (hypertensi

on), 



                           10:59 AM CDT              benign 

 

                                        



Results for this procedure are in the results section.



                 HEPATITIS PANEL  Routine         2020      HTN (hypertens

ion), 



                           10:59 AM CDT              benign 

 

                                        



Results for this procedure are in the results section.



                 HIV AG/AB COMBO ROUTINE  Routine         2020      HTN (h

ypertension), 



                     SCREENING           10:59 AM CDT        benign 

 

                                        



Results for this procedure are in the results section.



                 LIVER PROFILE   Routine         2020      HTN (hypertensi

on), 



                           10:59 AM CDT              benign 

 

                                        



Results for this procedure are in the results section.



                 UREA NITROGEN/CREATININE  Routine         2020      HTN (

hypertension), 



                           10:59 AM CDT              benign 

 

                                        



Results for this procedure are in the results section.



                 LIPID PROFILE   Routine         2020      Hyperlipidemia,

 



                           10:59 AM CDT              unspecified 



                                         hyperlipidemia type 

 

                                        



Results for this procedure are in the results section.



                 GLUCOSE         Routine         2020      HTN (hypertensi

on), 



                           10:59 AM CDT              benign 

 

                                        



Results for this procedure are in the results section.



                 CBC/DIFF        Routine         2020      HTN (hypertensi

on), 



                           10:59 AM CDT              benign 

 

                                        



Results for this procedure are in the results section.



                 ELECTROLYTES    Routine         2020      HTN (hypertensi

on), 



                           10:59 AM CDT              benign 



after 2019



Results

* CBC/Diff (2020 10:59 AM CDT)



    



              Component    Value        Ref Range    Performed At  Pathologist



                                         Signature

 

    



                 WBC             4.8             4.5 - 11.0 K/uL  [facility] 

 

    



                 RBC             3.65 (L)        4.20 - 5.40 M/uL  [facility] 

 

    



                 Hemoglobin      10.6 (L)        12.0 - 16.0 g/dL  [facility] 

 

    



                 Hematocrit      33.9 (L)        37.0 - 47.0 %   [facility] 

 

    



                 MCV             92.9 (H)        82.0 - 92.0 fL  [facility] 

 

    



                 MCH             29.0            27.0 - 32.0 pg  [facility] 

 

    



                 MCHC            31.3 (L)        32.0 - 36.0 g/dL  [facility] 

 

    



                 RDW             48.0 (H)        36.4 - 46.3 fL  [facility] 

 

    



                 Platelet        374             150 - 400 K/uL  [facility] 

 

    



                 Mean Platelet   10.3            9.4 - 12.4 fL   [facility] 



                           Volume                    [facility] 

 

    



                 Percent NRBC    0.0             %               [facility] 

 

    



                 Neutrophil      51.1            34.0 - 70.0 %   [facility] 

 

    



                 Lymphs          34.3            20.0 - 50.0 %   [facility] 

 

    



                 Monocytes       9.8             5.0 - 12.0 %    [facility] 

 

    



                 Eos             2.7             0.7 - 5.0 %     [facility] 

 

    



                 Basos           1.9 (H)         0.1 - 1.2 %     [facility] 

 

    



                 Immature        0.2             0.0 - 0.5 %     [facility] 



                           Granulocytes              [facility] 

 

    



                 Neutrophils     2.46            1.56 - 6.13 K/uL  [facility] 



                           (Absolute)                [facility] 

 

    



                 Lymphs          1.65            1.18 - 3.74 K/uL  [facility] 



                           (Absolute)                [facility] 

 

    



                 Monocytes(Absol  0.47 (H)        0.24 - 0.36 K/uL  [facility] 



                           [place])                      [facility] 

 

    



                 Eos (Absolute)  0.13            0.04 - 0.36 K/uL  [facility] 



                                         [facility] 

 

    



                 Baso (Absolute)  0.09 (H)        0.01 - 0.08 K/uL  [facility] 

 

    



                 Immature Grans  0.01            0.00 - 0.03 K/uL  [facility] 



                           (Abs)                     [facility] 

 

    



                 Absolute NRBC   0.00            K/uL            [facility] 











                                         Specimen

 





                                         Blood







   



                 Performing Organization  Address         City/[facility]

one Number

 

   



                 [facility]  [address] [place], [place] [address] [phone]





* Vitamin D, 25-Hydroxycalciferol (2020 10:59 AM CDT)



    



              Component    Value        Ref Range    Performed At  Pathologist



                                         Signature

 

    



                 Vit D,          13.5 (L)        30.0 - 100.0 ng/mL  [facility] 



                           25-Hydroxy                [facility] 

 

    



                 Vitamin D       Deficient (A)   Sufficient      [facility] 



                     Interpretation      Comment:            [facility]ORY 



                                         Sufficient:  >30.0   



                                         Insufficient: 20.0 - 29.9   



                                         Deficient:   <20.0   











                                         Specimen

 





                                         Blood







   



                 Performing Organization  Address         City/State/[facility]

one Number

 

   



                 [facility]  [address] [place] [address] [phone]





* HIV-1/HIV-2 Routine Screening (2020 10:59 AM CDT)



    



              Component    Value        Ref Range    Performed At  Pathologist



                                         Signature

 

    



                 HIV Ag/Ab Combo  Negative        Negative        [facility] 











                                         Specimen

 





                                         Blood







   



                 Performing Organization  Address         City/State/[facility]

one Number

 

   



                 [facility]  [address] [place] [address] [phone]





* Electrolytes (2020 10:59 AM CDT)



    



              Component    Value        Ref Range    Performed At  Pathologist



                                         Signature

 

    



                 Sodium          143             136 - 145 mmol/L  [facility] 

 

    



                 Potassium       3.6             3.5 - 5.1 mmol/L  [facility] 

 

    



                 Chloride        106             98 - 107 mmol/L  [facility] 

 

    



                 CO2             25              21 - 31 mmol/L  [facility] 

 

    



                 Anion Gap       12              5 - 16 mmol/L   [facility] 











                                         Specimen

 





                                         Blood







   



                 Performing Organization  Address         City/State/[facility]

one Number

 

   



                 [facility]  [address] [place] [address]  [phone]





* Liver Profile (2020 10:59 AM CDT)



    



              Component    Value        Ref Range    Performed At  Pathologist



                                         Signature

 

    



                 Total Protein   6.5             6.0 - 8.3 g/dL  [facility] 

 

    



                 Bilirubin,      0.3             0.2 - 1.2 mg/dL  [facility] 



                           Total                     [facility] 

 

    



                 Alkaline        88              34 - 104 U/L    [facility] 



                           Phosphatase               [facility] 

 

    



                 AST             22              13 - 39 U/L     [facility] 



                                         [facility] 

 

    



                 Direct          0.1             0.0 - 0.2 mg/dL  [facility] 



                           Bilirubin                 [facility] 

 

    



                 ALT             15              7 - 52 U/L      [facility] 



                                         [facility] 

 

    



                 Albumin         4.0             3.7 - 5.3 g/dL  [facility] 



                                         [facility] 











                                         Specimen

 





                                         Blood







   



                 Performing Organization  Address         City/State/Zipcode  Ph

one Number

 

   



                 [facility]  [address] [place] [address]  [phone]





* Lipid Profile (2020 10:59 AM CDT)



    



              Component    Value        Ref Range    Performed At  Pathologist



                                         [facility]

 

    



                 Cholesterol     196.0           <=200.0 mg/dL   [facility] 

 

    



                 Triglyceride    212 (H)         <150 mg/dL      [facility] 



                                         [facility] 

 

    



                 HDL             41.0            See Reference Range  [facility] 



                           Narrative. mg/dL          [facility] 

 

    



                 LDL             113 (H)         <100 mg/dL      [facility] 



                           Comment:                  [facility] 



                                         Optimal: < 100.0 mg/dL   



                                         Near Optimal: 120-129 mg/dL   



                                         Borderline: 130-159 mg/dL   



                                         High: 160-189 mg/dL   



                                         Very High: >=190 mg/dL   

 

    



                     Patient             Yes                 [facility] 



                           Fasting?                  [facility] 











                                         Specimen

 





                                         Blood







   



                 Performing Organization  Address         City/State/Zipcode  Ph

one Number

 

   



                 [facility]  [address] [place] [address]  [phone]





* Hepatitis Panel (2020 10:59 AM CDT)



    



              Component    Value        Ref Range    Performed At  Pathologist



                                         [facility]

 

    



                 Hepatitis C     Negative        Negative        [facility] 



                           Virus (HCV)               LAB[facility] 



                                         Antibody    

 

    



                 Hep B Surface   Negative        Negative        [facility] 



                           Ag                        [facility] 

 

    



                 Hep A Vir Ab    Negative        Negative        [facility] 



                           IgM                       [facility] 

 

    



                 Hep B Core Ab   Negative        Negative        [facility] 



                           IgM                       [facility] 











                                         Specimen

 





                                         Blood







   



                 Performing Organization  Address         City/State/[facility]

one Number

 

   



                 [facility]  [address] [place] [address]  [phone]





* Glucose (2020 10:59 AM CDT)



    



              Component    Value        Ref Range    Performed At  Pathologist



                                         [facility]

 

    



                 Glucose         157 (H)         70 - 110 mg/dL  [facility] 



                                         [facility] 











                                         Specimen

 





                                         Blood







   



                 Performing Organization  Address         City/[facility]  Ph

one Number

 

   



                 [facility]  [address] [place] [address]  [phone]





* Calcium (2020 10:59 AM CDT)



    



              Component    Value        Ref Range    Performed At  Pathologist



                                         Signature

 

    



                 Calcium         9.5             8.6 - 10.3 mg/dL  [facility] 











                                         Specimen

 





                                         Blood







   



                 Performing Organization  Address         City/[facility]  Ph

one Number

 

   



                 [facility]  [address] [place] [address]  [phone]





* Urea Nitrogen/Creatinine (2020 10:59 AM CDT)



    



              Component    Value        Ref Range    Performed At  Pathologist



                                         Signature

 

    



                 Urea Nitrogen   11.0            7.0 - 25.0 mg/dL  [facility] 

 

    



                 Creatinine      0.4 (L)         0.6 - 1.2 mg/dL  [facility] 



                                         [facility] 

 

    



                 GFR, Estimated  >90             >=90 mL/min/1.73 m2  [facility] 



                                         [facility] 











                                         Specimen

 





                                         Blood







   



                 Performing Organization  Address         City/[facility]

one Number

 

   



                 [facility]  [address] [place] [address]  [phone]





after 2019



Insurance





                                        Type



            Payer      Benefit    Subscriber ID  Effective  Phone      Address 



                           Plan /                    Dates   



                                         Group     

 

                                         



            MEDICARE   MEDICARE   xxxxxxxxxxx  10/1/2002-  [phone]  [address]

X 



                     [address]          Present             [id number] 



                                         [place], [place] 



                                         [id number] 







     



            Guarantor Name  Account    Relation to  Date of    Phone      [place]

g Address



                     Type                Patient             Birth  

 

     



            Gianna Landis  Personal/F  Self       1955  [phone]

[address]  [id number]               (Home)              [address]



                                         [place] [address]

## 2020-11-30 NOTE — DIAGNOSTIC IMAGING REPORT
EXAM: CT Abdomen and Pelvis WITH contrast  

INDICATION:      

^ABD PAIN

^[id number]

^1915 

COMPARISON: None.

TECHNIQUE: Abdomen and pelvis were scanned utilizing a multidetector helical

scanner from the lung base to the pubic symphysis after administration of IV

contrast. Coronal and sagittal reformations were obtained. Routine protocol was

performed. Scan was performed when during portal venous phase.    

     IV CONTRAST: 100 mL of Isovue 370

     ORAL CONTRAST: None

            

COMPLICATIONS: None



RADIATION DOSE:

     Total DLP: 4002 mGy*cm

     Estimated effective dose: (DLP x 0.015 x size factor) mSv

     CTDIvol has been reviewed. It is below the limits set by the Radiation

Protocol Committee (RPC).

     Dose modulation, iterative reconstruction, and/or weight based adjustment

of the mA/kV was utilized to reduce the radiation dose to as low as reasonably

achievable. 



FINDINGS:



LINES and TUBES: None.



LOWER THORAX:  1.5 cm nodule in the right middle lobe (series 2 image 3).

Remainder of the lower thorax is within normal limits.



HEPATOBILIARY: The liver is diffuse hypodense compared to the spleen,

consistent with diffuse hepatic diffuse hepatic steatosis.  No focal hepatic

lesions. No biliary ductal dilation. 



GALLBLADDER: No radio-opaque stones or sludge.  No wall thickening.



SPLEEN: No splenomegaly. 



PANCREAS: No focal masses or ductal dilatation.  



ADRENALS: No adrenal nodules    



KIDNEYS/URETERS: Kidneys enhance symmetrically.  No hydronephrosis. There is

nonobstructive stone in the inferior pole of the right kidney. There are

multifocal areas of cortical thinning in the right kidney, likely sequela of

prior infection. 2 cm right renal cyst. 



GI TRACT: No abnormal distention, wall thickening, or evidence of bowel

obstruction.       Appendix is normal.



PELVIC ORGANS/BLADDER: There is a left adnexal cystic structure which measures

1.6 x 1.4 cm (series 2 image 67). Remainder of the pelvic organs are within

normal limits.



LYMPH NODES: No lymphadenopathy.



VESSELS: Unremarkable.



PERITONEUM / RETROPERITONEUM: No free air or fluid.



BONES: There are degenerative changes in the spine. There is compression

fracture of T12 vertebral body status post vertebroplasty. There is also

compression fracture of the L1 vertebral body with approximately 50% loss of

height anteriorly.



SOFT TISSUES: There is asymmetric atrophy of the right gluteal musculature of

indeterminate cause.   The soft tissues otherwise normal      



IMPRESSION: 

1.  No acute abdominopelvic abnormality identified.

2.  L1 vertebral body compression fracture of indeterminate age with 50% loss

of height anteriorly. Correlate with point tenderness and history of fall.

3.  Chronic T12 vertebral body fracture status post vertebroplasty.

4.  1.5 cm nodule in the right middle lobe which may represent mucous plugging

or solid lung nodule. However, there are no prior images for comparison.

Recommend nonemergent complete assessment of the lungs with chest CT.

5.  Left adnexal cystic structure which measures approximately 1.6 cm. Cystic

adnexal structures in postmenopausal patients are abnormal. Recommend

nonemergent evaluation with dedicated pelvic ultrasound.

6.  Nonobstructive right nephrolithiasis.

7.  Hepatic steatosis.

8.  Asymmetric atrophy of the right gluteal musculature of indeterminate cause.



Signed by: Edmar Gonzalez MD on 11/30/2020 8:18 PM

## 2020-11-30 NOTE — XMS REPORT
Continuity of Care Document

                             Created on: 2020



JULIO YOUNG

External Reference #: [id number]

: 1955

Sex: Female



Demographics





                          Address                   [address]

[place]  [address]

 

                          Home Phone                [phone]

 

                          Preferred Language        English

 

                          Marital Status            Unknown

 

                          [employer]ar[employer] Affiliation     Unknown

 

                          Race                      Unknown

 

                                        Additional Race(s) 



Other



Other

Other





 

                          Ethnic Group              Unknown





Author





                          Author                    [facility]

 

                          Address                   [address]

[place]  [address]



 

                          Phone                     Unavailable







Support





                Name            Relationship    Address         Phone

 

                    KATRIN (SISTER) CURT PRS                 [address]

[place]  [address]                      [phone]

 

                    PIYUSH  MICHELLE      PRS                 [id number]

[place]  [address]                      [phone]

 

                    EDWIN YOUNG     PRS                 [address]

[place]  [address]                      [phone]

 

                    Rolan Clifton ECON                [address]

[place]  [address]                      [phone]







Care Team Providers





                    Care Team Member Name Role                Phone

 

                    NONSTAFF            PCP                 Unavailable

 

                    Maico PRATT, CAROLYN Zarate Attphys             [phone]

 

                    BUNNY BRUCE    Attphys             Unavailable

 

                    Denny RICHARD, F Gisele Attphys             Unavailable

 

                    MYRA GRACE    Attphys             Unavailable

 

                    Kailash Tran   Attphys             [phone]

 

                    Aayush Rubalcava Attphys             [phone]

 

                    TONIE GARCIA  Attphys             Unavailable

 

                    Aki Ochoa    Attphys             [phone]

 

                    Jannet Moses   Attphys             [phone]

 

                    Arun Funes    Attphys             [phone]

 

                    Brenden Zuniga Attphys             [phone]

 

                    Haresh Donis Attphys             [phone]

 

                    Franki Duron Attphys             [phone]

 

                    PABLO Alonzo     Attphys             [phone]

 

                    Silas Chavez Attphys             [phone]

[phone]

 

                    MICHELE RIOJAS Admphys             Unavailable

 

                    Haresh Donis             [phone]







Payers





           Payer Name Policy Type Policy Number Effective Date Expiration Date S

beronica

 

                                        MEDICAREMEDICARE PART A & [id number]

[id number], [place] [address]              xxxxxxxxxxx  2002-10-01 00:00:00       

       [facility]

 

           MEDICARE PART A AND B            [id number] 2002-10-01 00:00:00     

        

 

           Medicare A & B            NA         2002-10-01 00:00:00            [facility]







Problems





           Condition Name Condition Details Condition Category Status     Onset 

Date Resolution

Date            Last Treatment Date Treating Clinician Comments        Source

 

                          DX: FRACTURE MID FOOT RT/OSTEOPOROSIS                 

        DX: FRACTURE MID 

FOOT RT/OSTEOPOROSIS                        Active                        
2019                                                                      
                          [facility]                     Diagnosis           Ac

tive              

2019 00:00:00              2019 13:20:00                           M

jason Carmichael

 

                          FLANK PAIN                                        FLAN

K PAIN                        Active     

                  2019                                                    
                                            [facility]                     

Diagnosis Active    2019 00:00:00           2019 18:30:00           

          Ruthie Carmichael

 

                          FALL                                              FALL

                        Active                 

      2018                                                                
                                [facility]                     Diagnosis       

          Active

           2018 00:00:00            2018 14:55:00                   

    Ruthie Carmichael

 

       Fibromyalgia Fibromyalgia Disease Active 2018 00:00:00             

                [facility]

 

                          ACUTE UPPER GI BLEED                              ACUT

E UPPER GI BLEED               

        Active                        2017                                
                                                                [facility]     
               Diagnosis Active  2017 00:00:00         2017 07:05:00

                 

Ruthie Carmichael

 

                          UNK                                               UNK 

                       Active                   

    2017                                                                  
                              [facility]                     Diagnosis         

  Active              

2017 00:00:00              2017-10-13 15:42:00                           M

emorikateryna Carmichael

 

                          DEHYDRATION                                       DEHY

DRATION                        Active   

                    2017                                                  
                                              [facility]                     

Diagnosis Active    2017 00:00:00           2017 15:22:00           

          Ruth[facility] Carmichael

 

                          VOMITING                                          VOMI

TING                        Active         

              2017                                                        
                                        [facility]                     

Diagnosis Active    2017 00:00:00           2017 21:29:00           

          Ruthie Carmichael

 

                          VOMITING BLOOD                                    VOMI

TING BLOOD                        

Active                        2016                                        
                                                        [facility]             
        Diagnosis Active  2016 00:00:00         2016 20:13:00       

          Ruthie Carmichael

 

                          R06.02/ R94.31                                    R06.

02/ R94.31                        

Active                        2016                                        
                                                       [facility]         
           Diagnosis Active  2016 00:00:00         2016 08:59:00    

             

Ruthie Carmichael

 

                          4 WK FU                                           4 WK

 FU                        Active           

            06/15/2016                                                          
                                     [facility]                    
        Diagnosis Active  2016-06-15 00:00:00         2016 15:18:00       

          Ruthie Carmichael

 

                          CARDIAC CLEARANCE                                 CARD

IAC CLEARANCE                     

  Active                        2016                                      
                                                         [facility]
                    Diagnosis  Active     2016 00:00:00            2016 16:38:00  

                                                    Ruthie Carmichael

 

       Ovarian cyst Ovarian cyst Disease Active 2010 00:00:00             

                [facility]

 

       Chest pain Chest pain Problem Active                                    [facility]

 

       Dyspnea Dyspnea Problem Active                                    [facility]

 

       Weakness Weakness Problem Active                                    [facility]

 

       Hyperlipidemia Hyperlipidemia Disease Active                             

       [facility]

 

                    Poliomyelitis osteopathy of lower leg Poliomyelitis osteopat

hy of lower leg 

Disease   Active                                                      [facility]

 

       Renal calculus or stone Renal calculus or stone Disease Active           

                         [facility]

 

                    GERD (gastroesophageal reflux disease) GERD (gastroesophagea

l reflux disease) 

Disease   Active                                                      [facility]

 

                          Nausea with vomiting, unspecified                     

    Nausea with vomiting, 

unspecified                                                                     
                          2019                                            
   [facility]                     Problem                         2019 1

4:21:39                 

[facility]

 

                          Dysuria                                           Dysu

mary                                         

                                                      2019                
                               [facility]                     Problem         

                                    

2019 14:21:39                                         [facility]

 

                          Essential (primary) hypertension                      

   Essential (primary) 

hypertension                                                                    
                           2019                                           
    [facility]                         2019 

11:00:30                 

[facility] [place]

 

                          Type 2 diabetes mellitus without complications        

                 Type 2 

diabetes mellitus without complications                                         
                                                      2019                
                                [facility]                     Problem         

                                    

2019 11:00:30                                         Ruthie Carmichael

 

                          Personal history of urinary calculi                   

      Personal history of 

urinary calculi                                                                 
                              2019                                        
        [facility]                     Problem                          11:00:30                 

[facility] [place]

 

                          Nicotine dependence, cigarettes, uncomplicated        

                 Nicotine 

dependence, cigarettes, uncomplicated                                           
                                                    2019                  
                             [facility]                     Problem           

                                  

2019 11:00:30                                         Ruthie Carmichael

 

                          Gastro-esophageal reflux disease without esophagitis  

                       

Gastro-esophageal reflux disease without esophagitis                            
                                                                   07/15/2018   
                                             [facility]                     

Problem                                 2018-07-15 11:49:02                     

[facility]

 

                          Fall on same level due to ice and snow, initial encoun

ter                       

 Fall on same level due to ice and snow, initial encounter                      
                                                                         
07/15/2018                                                [facility]          
          Problem                         2018-07-15 11:49:02                 Bernadine Carmichael

 

                          Personal history of nicotine dependence               

          Personal history

of nicotine dependence                                                          
                                     07/15/2018                                 
               [facility]                     Problem                          

                   2018-07-15 11:49:02

                                                            Ruthie Carmichael

 

                          Personal history of malignant neoplasm of breast      

                   

Personal history of malignant neoplasm of breast                                
                                                               2019       
                                         [facility]                     Problem

                                        2019 11:00:30                     

Ruthie Carmichael

 

                          Personal history of poliomyelitis                     

    Personal history of 

poliomyelitis                                                                   
                            07/15/2018                                          
      [facility]                     Problem                         2018-07-15

 11:49:02                 

Ruthie Carmichael

 

                          Urinary tract infection, site not specified           

              Urinary 

tract infection, site not specified                                             
                                                  2019                    
                           [facility]                     Problem             

                                

2019 11:00:30                                         Ruthie Carmichael

 

                          Calculus of kidney                                Calc

ulus of kidney                   

                                                                            
2019                                                 [facility]          
          Problem                         2019 11:00:30                 Bernadine Carmichael

 

                          Other long term (current) drug therapy                

         Other long term 

(current) drug therapy                                                          
                                     2019                                 
              [facility]                     Problem                          

                   2019 11:00:30

                                                            Ruthie Carmichael

 

                          Hypertensive disorder, systemic arterial (disorder)   

                      

Hypertensive disorder, systemic arterial (disorder)                        
Resolved                                                Problem                 
      2019                                                [facility]                     Problem      Resolved            

                   2019 

23:10:52                                                    Ruthie Carmichael

 

                          Malignant tumor of breast (disorder)                  

       Malignant tumor of 

breast (disorder)                        Resolved                               
                Problem                        2019                       
                        [facility]             
        Problem Resolved                 2019 23:10:52                 Casie Carmichael

 

                          Late effects of poliomyelitis (disorder)              

           Late effects of

poliomyelitis (disorder)                        Resolved                        
                       Problem                        2019                
                               [facility]                     

Problem   Resolved                      2019 23:10:52                     

Ruthie Carmichael

 

                          Diabetes mellitus type 2 (disorder)                   

      Diabetes mellitus 

type 2 (disorder)                        Resolved                               
                Problem                        2019                       
                        [facility]                     Problem  

                 

Resolved                         2019 23:10:52                       Paola Carmichael

 

                          Benign hypertension (disorder)                        

 Benign hypertension 

(disorder)                        Active                                        
       Problem                        2019                                
               [facility]                     [facility]      Active                   

              2019 23:10:52

                                                            Ruthie Carmichael

 

                          Dyspnea on exertion (finding)                         

Dyspnea on exertion 

(finding)                        Active                                         
      Problem                        2019                                 
              Marla Neuro,[facility]                     Problem      Active                   

              2019 23:10:52

                                                            Ruthie Mckeonann

 

                          Abnormal ECG (finding)                            Abno

rmal ECG (finding)           

            Active                                                Problem       
                2019                                                
Marla [facility]                     Problem Active                  2019 23:10:52

                 R[facility]ie

[place]

 

                          Major depressive disorder, single episode, moderate   

                      

Major depressive disorder, single episode, moderate                        
Active                                                Problem                   
    2019                                                [facility]                     Problem Active                  2019 03:45:02  

               [facility] 

[place]

 

                          Anxiety disorder, unspecified                         

Anxiety disorder, 

unspecified                        Active                                       
        Problem                        2019                               
                [facility]                     Problem         Ac

tive                           

                2019 03:45:02                                 Ruthie bejarano

 

                          Insomnia due to other mental disorder                 

        Insomnia due to 

other mental disorder                        Active                             
                  Problem                        2019                     
                          [facility]                     Problem 

                  

Active                           2019 03:45:02                       Paola Carmichael

 

                          PATHOLOGICAL FRACTURE, RIGHT FOOT, INIT               

          PATHOLOGICAL 

FRACTURE, RIGHT FOOT, INIT                        Active                        
                                                                                
              [facility]                     Diagnosis    Active              

                   2019 

13:15:00                                                    Ruthie Carmichael

 

                          AGE-RELATED OSTEOPOROSIS W/O CURRENT PAT              

           AGE-RELATED 

OSTEOPOROSIS W/O CURRENT PAT                        Active                      
                                                                                
                [facility]                     Diagnosis    Active            

                     2019

13:20:00                                                    Ruthie Carmichael

 

                          OTH OSTEOPOR W CRNT PATH FX, R ANK/FT, 7              

           OTH OSTEOPOR W 

CRNT PATH FX, R ANK/FT, 7                        Active                         
                                                                                
             [facility]                     Diagnosis    Active               

                  2019 

13:20:00                                                    Ruthie Carmichael

 

                          ENCNTR FOR GENERAL ADULT MEDICAL EXAM W/              

           ENCNTR FOR 

GENERAL ADULT MEDICAL EXAM W/                        Active                     
                                                                                
                 [facility]                     Diagnosis       Act

sangeetha                          

                2016 16:38:00                                 Ruthie bejarano

 

                          SHORTNESS OF BREATH                               SHOR

TNESS OF BREATH                 

      Active                                                                    
                                                   [facility]             
        Diagnosis Active                  2016 08:59:00                 Me

sethal [facility]

 

                          ABNORMAL ELECTROCARDIOGRAM [ECG] [EKG]                

         ABNORMAL 

ELECTROCARDIOGRAM [ECG] [EKG]                        Active                     
                                                                                
                   [facility]                     Diagnosis    Active     

                            

2016 08:59:00                                         Ruthie Carmichael

 

                          RADICULOPATHY, LUMBAR REGION                         R

ADICULOPATHY, LUMBAR 

REGION                        Active                                            
                                                                            [facility]                     Diagnosis Active                  2017-10-13 15:42

:00                 

Ruthie Carmichael

 

                          GASTROINTESTINAL HEMORRHAGE, UNSPECIFIED              

           

GASTROINTESTINAL HEMORRHAGE, UNSPECIFIED                        Active          
                                                                                
                              [facility]                     Diagnosis        

   Active              

                          2017 07:05:00                           [facility]

 

                          Low back pain                                     Low 

back pain                             

                   2019                                                
2019                                                 [facility]          
             Problem                   2019 06:00:00 2019 11:00:30 2

 11:00:30

                                                            [facility]

 

                          Unspecified abdominal pain                         Uns

pecified abdominal pain   

                                             09/10/2018                         
                       2019                                               
  [facility]                     Problem                         2018-09-10 05:

00:00 2019 

14:21:39        2019 14:21:39                                 [facility]

 

                          Sacrococcygeal disorders, not elsewhere classified    

                     

Sacrococcygeal disorders, not elsewhere classified                              
                  04/15/2018                                                
07/15/2018                                                 [facility]          
             Problem                   2018-04-15 02:48:21 2018-07-15 11:49:02 2

018-07-15 11:49:02

                                                            [facility]

 

                          Unspecified fall, initial encounter                   

      Unspecified fall, 

initial encounter                                                2018     
                                           07/15/2018                           
                      [facility]                     Problem                   

              2018 

05:00:00     2018-07-15 11:49:02 2018-07-15 11:49:02                           M

emohamilton Carmichael

 

                          Strain of muscle, fascia and tendon at neck level, ini

tial encounter            

             Strain of muscle, fascia and tendon at neck level, initial 
encounter                                                2017                                   
              [facility]                     Problem                         20

04 05:00:00 

2017 00:28:11 2017 00:28:11                                 [facility]

 [place]

 

                          Unspecified injury of head, initial encounter         

                

Unspecified injury of head, initial encounter                                   
             2017                                                 [facility]          
             Problem                   2017 05:00:00 2017 00:28:11 2

 00:28:11

                                                            [facility]

 

                          Discharge Diagnosis: Abdominal pain                   

      Discharge Diagnosis:

 Abdominal pain                                                2016                             
                    [facility]                     Problem                     

            2016 

05:00:00     2016 01:43:41 2016 01:43:41                           [facility]

[facility]

 

                          Discharge Diagnosis: Acid reflux disease              

           Discharge 

Diagnosis: Acid reflux disease                                                
2016            
                                     [facility]                     Problem    

                             

2016 05:00:00 2016 01:43:41 2016 01:43:41                     

      [facility]

 

                          Discharge Diagnosis: Urinary tract infection          

               Discharge 

Diagnosis: Urinary tract infection                                              
  2016          
                                      [facility]                     Problem  

                  

           2016 05:00:00 2016 01:43:41 2016 01:43:41          

             Ruthie Carmichael







Allergies, Adverse Reactions, Alerts





        Allergy Name Allergy Type Status  Severity Reaction(s) Onset Date Inacti

ve Date 

Treating Clinician        Comments                  Source

 

           Sulfa (Sulfonamide Antibiotics) Allergy to substance Active     Sever

e     HIVES      

2018 00:00:00                                                 [facility]

 

       Sulfa (Sulfonamide Antibiotics) DA     Active MI            2015 00

:00:00                      [facility]

 

          Naproxen  Propensity to adverse reactions to drug Active              

          2011 00:00:00 

                                                            [facility]

 

                Sulfa (Sulfonamide Antibiotics) Propensity to adverse reactions 

to drug Active           

                      2011 00:00:00                                  [facility]

 

           Ondansetron Propensity to adverse reactions to drug Active           

                2000 

00:00:00                                                        [facility]

 

       sulfa drugs sulfa drugs Active                                           

Ruthie Carmichael

 

       Naprosyn Naprosyn Active                                           Alyssa Romero







Family History





           Family Member Diagnosis  Comments   Start Date Stop Date  Source

 

           Natural father Cancer                                      [facility]

 

           Paternal grandmother Diabetes                                    [facility]







Social History





           Social Habit Start Date Stop Date  Quantity   Comments   Source

 

           Sex Assigned At Birth                                             [facility]

 

                Alcohol intake  2020 00:00:00 2020 00:00:00 Current 

non-drinker of 

alcohol (finding)                                   [facility] SDOH Food Worry 2018-10-15 00:00:00 2018-10-15 00:00:00 2    

                 [facility] Food Scarcity 2018-10-15 00:00:00 2018-10-15 00:00:00 2 

                    [facility]

 

           Social History 2017 08:41:46 2017 08:41:46               

        Ruthie Carmichael







                Smoking Status  Start Date      Stop Date       Source

 

                Social History  2017-11-15 19:40:20 2017-11-15 19:40:20 Ruthie Carmichael

 

                Social History  2016-06-15 19:14:09                 [facility]

[facility]







Medications





             Ordered Medication Name Filled Medication Name Start Date   Stop Da

te    Current 

Medication? Ordering Clinician Indication Dosage     Frequency  Signature (SIG) 

Comments                  Components                Source

 

          ROPINIRole (REQUIP) 0.25 mg tablet           2020 00:00:00      

     Yes                 Restless leg 

syndrome                         TAKE 1 TABLET BY MOUTH EVERYDAY AT BEDTIME     

                  [facility]

 

          fenofibrate (LOFIBRA) 160 mg tablet           2020 00:00:00     

      Yes                 

Hyperlipidemia, unspecified hyperlipidemia type                                 

        TAKE 1 TABLET BY MOUTH EVERY

 DAY FOR CHOLESTEROL                                         [facility]

 

           ROPINIRole (REQUIP) 0.25 mg tablet            2020-10-29 00:00:00  00:00:00 No         

          Restless leg syndrome                     TAKE 1 TABLET BY MOUTH EVERY

DAY AT BEDTIME                     

[facility]

 

           ROPINIRole (REQUIP) 0.25 mg tablet            2020-10-07 00:00:00 202

0-10-29 00:00:00 No         

          Restless leg syndrome                     TAKE 1 TABLET BY MOUTH EVERY

DAY AT BEDTIME                     

[facility]

 

           ROPINIRole (REQUIP) 0.25 mg tablet            2020 00:00:00 202

0-10-07 00:00:00 No         

          Restless leg syndrome                     TAKE 1 TABLET BY MOUTH EVERY

DAY AT BEDTIME                     

[facility]

 

           ROPINIRole (REQUIP) 0.25 mg tablet            2020 00:00:00  00:00:00 No         

          Restless leg syndrome                     TAKE 1 TABLET BY MOUTH EVERY

DAY AT BEDTIME                     

[facility]

 

                dexlansoprazole (DEXILANT) 30 mg delayed release capsule        

         2020 00:00:00  

           Yes                   Gastroesophageal reflux disease without esophag

itis 30mg       QD         Take 1 

capsule by mouth daily.                                         [facility]

 

           ROPINIRole (REQUIP) 0.25 mg tablet            2020 00:00:00  00:00:00 No         

          Restless leg syndrome                     TAKE 1 TABLET BY MOUTH EVERY

DAY AT BEDTIME                     

[facility]

 

                DEXILANT 30 mg delayed release capsule                 2020

9 00:00:00 2020 00:00:00

           No                    Gastroesophageal reflux disease without esophag

itis                       TAKE 1 CAPSULE BY 

MOUTH EVERY DAY                                             [facility]

 

           ROPINIRole (REQUIP) 0.25 mg tablet            2020 00:00:00  00:00:00 No         

          Restless leg syndrome                     TAKE 1 TABLET BY MOUTH EVERY

DAY AT BEDTIME                     

[facility]

 

                    ergocalciferol (VITAMIN D2) 1,250 mcg (50,000 unit) capsule 

                    2020 

00:00:00            Yes                 Vitamin D deficiency 84052V             

 Take 1 capsule by mouth ONCE A 

WEEK.                                                       [facility]

 

           ROPINIRole (REQUIP) 0.25 mg tablet            2020 00:00:00  00:00:00 No         

          Restless leg syndrome                     TAKE 1 TABLET BY MOUTH EVERY

DAY AT BEDTIME                     

[facility]

 

          atenoloL (TENORMIN) 50 mg tablet           2020 00:00:00        

   Yes                 HTN 

(hypertension), benign 50mg       QD         Take 1 tablet by mouth daily.      

                 [facility]

 

             fenofibrate (LOFIBRA) 160 mg tablet              2020 00:00:0

0 2020 00:00:00 No

                          Hyperlipidemia, unspecified hyperlipidemia type 160mg 

       QD           Take 1 tablet by 

mouth daily For cholesterol.                                         [facility]

[facility]

 

                dexlansoprazole (DEXILANT) 30 mg delayed release capsule        

         2020 00:00:00 

2020 00:00:00 No                              Gastroesophageal reflux dise

ase without esophagitis 

30mg         QD           Take 1 capsule by mouth daily.                        

   [facility]

 

           ROPINIRole (REQUIP) 0.25 mg tablet            2020 00:00:00  00:00:00 No         

          Restless leg syndrome                     TAKE 1 TABLET BY MOUTH EVERY

DAY AT BEDTIME                     

[facility]

 

           ROPINIRole (REQUIP) 0.25 mg tablet            2020 00:00:00  00:00:00 No         

          Restless leg syndrome                     TAKE 1 TABLET BY MOUTH EVERY

DAY AT BEDTIME                     

[facility]

 

           ROPINIRole (REQUIP) 0.25 mg tablet            2020 00:00:00  00:00:00 No         

          Restless leg syndrome                     TAKE 1 TABLET BY MOUTH EVERY

DAY AT BEDTIME                     

[facility]

 

           ROPINIRole (REQUIP) 0.25 mg tablet            2020 00:00:00  00:00:00 No         

          Restless leg syndrome                     TAKE 1 TABLET BY MOUTH EVERY

DAY AT BEDTIME                     

[facility]

 

           ROPINIRole (REQUIP) 0.25 mg tablet            2020 00:00:00  00:00:00 No         

          Restless leg syndrome                     TAKE 1 TABLET BY MOUTH EVERY

DAY AT BEDTIME                     

[facility]

 

           ROPINIRole (REQUIP) 0.25 mg tablet            2019 00:00:00  00:00:00 No         

          Restless leg syndrome .25mg               TAKE 1 TABLET BY MOUTH AT 

DTIME NIGHTLY.                     

[facility]

 

                cloNIDine HCl (CATAPRES) tablet 0.1 mg                 2019 10:30:00 2019 10:29:00

        No              Fibromyalgia .1mg                                    [facility]

[facility]

 

        pregabalin (LYRICA) 50 mg capsule         2019 00:00:00         Ye

s             Fibromyalgia 

50mg                      Take 1 capsule by mouth 3 times daily.                

           [facility]

 

          atenolol (TENORMIN) 50 mg tablet           2019 00:00:00 2020 00:00:00 No                  

HTN (hypertension), benign 50mg       QD         Take 1 tablet by mouth daily.  

                     [facility]

 

                dexlansoprazole (DEXILANT) 30 mg delayed release capsule        

         2019 00:00:00 

2020 00:00:00 No                              Gastroesophageal reflux dise

ase without esophagitis 

30mg         QD           Take 1 capsule by mouth daily.                        

   [facility]

 

             fenofibrate (LOFIBRA) 160 mg tablet              2019 00:00:0

0 2020 00:00:00 No

                          Hyperlipidemia, unspecified hyperlipidemia type 160mg 

       QD           TAKE 1 TABLET BY 

MOUTH DAILY FOR CHOLESTEROL.                                         [facility]

[facility]

 

           ROPINIRole (REQUIP) 0.25 mg tablet            2019-10-29 00:00:00 201

26 00:00:00 No         

          Restless leg syndrome .25mg               TAKE 1 TABLET BY MOUTH AT BE

[facility] NIGHTLY.                     

[facility]

 

          atenolol (TENORMIN) 25 mg tablet           2019-10-29 00:00:00 2019 00:00:00 No                  

HTN (hypertension), benign                       TAKE 1 TABLET BY MOUTH EVERY DA

Y                       [facility]

 

                dexlansoprazole (DEXILANT) 30 mg delayed release capsule        

         2019 00:00:00 

2019 00:00:00 No                              Gastroesophageal reflux dise

ase without esophagitis 

30mg         QD           Take 1 capsule by mouth daily.                        

   [facility]

 

                dexlansoprazole (DEXILANT) 30 mg delayed release capsule        

         2019 00:00:00 

2019 00:00:00 No                              Gastroesophageal reflux dise

ase without esophagitis 

30mg         QD           Take 1 capsule by mouth daily.                        

   [facility]

 

           pregabalin (LYRICA) 50 mg capsule            2019 00:00:00 2019 00:00:00 No          

          Fibromyalgia 50mg                Take 1 capsule by mouth 3 times daily

.                     [facility]

 

             fenofibrate (LOFIBRA) 160 mg tablet              2019 00:00:0

0 2019 00:00:00 No

                          Hyperlipidemia, unspecified hyperlipidemia type 160mg 

       QD           Take 1 tablet by 

mouth daily For cholesterol.                                         [facility]

[facility]

 

       hydrocortisone 2.5 % ointment        2019 00:00:00        Yes      

     Dermatitis        Q.5D   

Apply to affected area 2 times daily.                                         [facility]

 

          traMADol (ULTRAM) 50 mg tablet           2019 00:00:00 2019

7 00:00:00 No                  

Closed fracture of right ankle, sequela 50mg                                    

Take 1 tablet by mouth every 6 

hours as needed for Pain for pain (1 tablet = 50 mg).                           

              [facility]

 

       cephalexin 500 mg oral capsule        2019 04:45:00        No      

                           500 mg = 1 

cap, PO, TID, X 10 day, # 30 cap, 0 Refill(s)                                   

      [facility]

 

                                        Acetaminophen 300 MG / Codeine Phosphate

 30 MG Oral Tablet [Tylenol with Codeine

 #3]            2019 04:42:00         No                                  

    1 - 2 tab, PO, TID, PRN Pain, X 3 day, #

15 tab, 0 Refill(s)                                         [facility]

 

       tizanidine 4 mg oral tablet        2019 04:41:00        Yes        

                        1/2-1 tab, PO, 

Q8H, PRN for muscle spasms, # 20 tab, 0 Refill(s)                               

          [facility]

 

       Fentanyl        2019 03:33:00        No                            

     Notes: (Same as: Sublimaze)  

Preservative free.                                          [facility]

 

       Ceftriaxone        2019 03:15:00        No                         

        Notes: (Same As: Rocephin).  Use 

with 100 mL NS and infuse over 30 min   *** MEDICATION WASTE *** Product Size:  
1000 mg Product Wasted:  ___ mg                                         [facility]

 

       Ondansetron        2019 01:09:00        No                         

        Notes: (Same as: Zofran)   *** 

MEDICATION WASTE *** Product Size:  4 mg Product Wasted:  ___ mg                

                         [facility]

 

      Morphine       2019 01:09:00       No                            Not

es: (Same as:MORPhine Sulfate)       

                                        Ruthie Carmichael

 

       Sodium Chloride 0.9% (Bolus) IV        2019 01:08:00        No     

                            1,000 mL, 

1000 ml/hr, Infuse Over: 1 hr, Route: IV, 1,000, Drug form: INJ, ONCE, Priority:
STAT, Dosing Weight 67.273 kg, Start date: 19 19:08:00 CST, Stop date: 
19 19:08:00 CST                                         Ruthie Carmichael

 

                          Acetaminophen With Codeine (Tylenol With Codeine #3 Ta

blet) 1 Each TABLET 

Acetaminophen With Codeine (Tylenol With Codeine #3 Tablet) 1 Each TABLET 

2018 13:01:00        Yes                  300           Every 4 Hours as n

eeded for Pain               [facility]

 

        Ondansetron 4 MG Disintegrating Tablet [Zofran]         2018 02:12

:00         Yes                     

                                                    4 mg = 1 tab, PO, BID, PRN N

ausea and Vomiting, Dissolve tab under tongue, #

 10 tab, 0 Refill(s)                                         R[facility]ie [facility]ann

 

       tramadol hydrochloride 50 MG Oral Tablet        2018 02:12:00      

  No                                 50 

mg = 1 tab, PO, Q6H, not to exceed 400 mg/dayofran, X 5 day, # 20 tab, 0 
Refill(s)                                                   [facility]

 

                    Acetaminophen 325 MG / Hydrocodone Bitartrate 5 MG Oral Tabl

et [Norco 5/325]                     

2018 01:16:00           No                                                

Notes: (Same as: Norco 325/5)  Do not exceed 

4gm/day of acetaminophen.                                         [facility]

nn

 

       Ketorolac        2018 01:16:00        No                           

       4 days   *** MEDICATION WASTE *** 

Product Size:  30 mg Product Wasted:  ___ mg                                    

     [facility]

 

       Ondansetron        2018-09-10 21:11:00        No                         

        Notes: (Same as: Zofran)   *** 

MEDICATION WASTE *** Product Size:  4 mg Product Wasted:  ___ mg                

                         Ruthie Mckeonann

 

      Morphine       2018-09-10 21:11:00       No                            Not

es: (Same as:MORPhine Sulfate)       

                                        [facility]

 

       Saline Flush 0.9%        2018-09-10 21:11:00        No                   

              Notes: (Same as: BD 

Posiflush)                                                  Ruthie Mckeonann

 

          tramadol hydrochloride 50 MG Oral Tablet [Ultram]           2018

 22:17:00           No                   

                                 50 mg = 1 tab, PO, Q4H, PRN pain, X 3 day, # 20

 tab, 0 Refill(s)                       

[facility] [place]

 

                    Acetaminophen 325 MG / Hydrocodone Bitartrate 5 MG Oral Tabl

et [Norco 5/325]                     

2018 19:08:00           No                                                

1 tab, Route: PO, Drug Form: TAB, Dosing Weight

68.182, kg, ONCE, STAT, Start date: 18 14:08:00 CDT, Stop date: 18 
14:08:00 CDT                                                Ruthie Carmichael

 

       Ketorolac        2018 19:08:00        No                           

      30 mg, Route: IM, Drug form: INJ, 

ONCE, Dosing Weight 68.182, kg, Priority: STAT, Start date: 18 14:08:00 
CDT, Stop date: 18 14:08:00 CDT                                         [facility]

alejandra Carmichael

 

        omeprazole 40 mg oral delayed release capsule         2017-12-10 17:02:5

7         Yes                             

                40 mg = 1 cap, PO, BID-Before Meals, # 60 cap, 0 Refill(s)      

                           Ruthie Carmichael

 

       Sodium Chloride 0.9% IV 1,000 mL        2017-12-10 16:12:00        No    

                             1,000 mL, 

Rate: 25 ml/hr, Infuse over: 40 hr, Route: IV, Dosing Weight 68.182 kg, Total 
Volume: 1,000, Start date: 12/10/17 10:12:00 CST, Duration: 1 day, Stop date: 
17 10:11:00 CST, 1.74, m2                                         [facility]

 

             potassium chloride 20 mEq oral tablet, extended release            

  2017 23:20:00              

No                                                               20 mEq, 1 tab, 

Route: PO, ONCE, Dosing Weight 68.182, kg, Start date:

17 17:20:00 CST, Stop date: 17 17:20:00 CST                         

                Ruthie Carmichael

 

       influenza virus vaccine, inactivated        2017 15:00:00        No

                                 Notes: 

(Same as: Fluzone Quadrivalent, Fluarix Quadrivalent)  For 3 years of age and 
older (0.5 mL IM) Shake well before use                                         

Ruthie Carmichael

 

       Saline Flush 0.9%        2017 09:42:00        No                   

              Notes: (Same as: BD 

Posiflush)                                                  Ruthie Carmichael

 

                          Acetaminophen 325 MG / Hydrocodone Bitartrate 10 MG Or

al Tablet [Norco 10/325]  

        2017 08:48:00         Yes                                     1 ta

b, PO, Q6H, PRN for pain, # 24 tab, 0 

Refill(s)                                                   Ruthie Carmichael

 

       Vitamin D3        2017 08:48:00        Yes                         

       See Instructions, 1,000 IntlUnit,

0 Refill(s)                                                 Ruthie Carmichael

 

       Citalopram 10 MG Oral Tablet [Celexa]        2017 08:48:00        Y

es                                10 mg 

= 1 tab, PO, Daily, # 30 tab, 0 Refill(s)                                       

  Ruthie Carmichael

 

        metoprolol 25 mg oral tablet, extended release         2017 08:48:

00         Yes                      

                          25 mg = 1 tab, PO, Daily, # 30 tab, 0 Refill(s)       

                    Ruthie Carmichael

 

                    Enalapril Maleate 5 MG / Hydrochlorothiazide 12.5 MG Oral Ta

blet                     2017 

08:48:00        Yes                                1 tab, PO, Daily, # 30 tab, 0

 Refill(s)               Ruthie Carmichael

 

       Zofran        2017 07:51:00        No                              

   Notes: (Same as: Zofran)   *** 

MEDICATION WASTE *** Product Size:  4 mg Product Wasted:  ___ mg                

                         Ruthie Carmichael

 

      Morphine       2017 07:51:00       No                            Not

es: (Same as:MORPhine Sulfate)       

                                        Ruthie Carmichael

 

     Ativan      2017 07:51:00      No                       Notes: (Same 

as: Ativan)           Ruthie Carmichael

 

       Sodium Chloride 0.9% (Bolus) IV        2017 06:29:00        No     

                            1,000 mL, 

1000 ml/hr, Infuse Over: 1 hr, Route: IV, 1,000, Drug form: INJ, ONCE, Priority:
 STAT, Dosing Weight 68.182 kg, Start date: 17 0:29:00 CST, Stop date: 
17 0:29:00 CST                                         Ruthie Carmichael

 

           pantoprazole 80 mg + Sodium Chloride 0.9%  mL            2017 06:27:00            No          

                                                                100 mL, Rate: 10

 ml/hr, Infuse over: 10 hr, Route: IVPB, Dosing Weight 

68.182 kg, Total Volume: 100, Infuse at 8 mg / hr for 72 hours for GI bleeding, 
Start date: 17 0:27:00 CST, Duration: 72 hr, Stop date: 17 0:26:00 
CST, 1.74, m2                                               Ruthie Carmichael

 

       Protonix        2017 06:27:00        No                            

     Notes: For IV push reconstitute with

 10 ml 0.9% sodium chloride and push over 2 minutes. (Same as: Protonix)        

                                 

Ruthie Carmichael

 

       Fentanyl        2017 03:49:00        No                            

     Notes: (Same as: Sublimaze)  

Preservative free.                                          Ruthie Carmichael

 

       Zofran        2017 03:49:00        No                              

   Notes: (Same as: Zofran)   *** 

MEDICATION WASTE *** Product Size:  4 mg Product Wasted:  ___ mg                

                         Ruthie Carmichael

 

       Sodium Chloride 0.9% (Bolus) IV        2017 03:49:00        No     

                            1,000 mL, 

1000 ml/hr, Infuse Over: 1 hr, Route: IV, 1,000, Drug form: INJ, ONCE, Priority:
 STAT, Dosing Weight 68.182 kg, Start date: 17 21:49:00 CST, Stop date: 
17 21:49:00 CST                                         [facility] [place]

 

       Saline Flush 0.9%        2017 02:21:00        No                   

              Notes: (Same as: BD 

Posiflush)                                                  Ruthie Carmichael

 

        Ondansetron 4 MG Disintegrating Tablet [Zofran]         2017 05:17

:00         Yes                     

                                                    4 mg = 1 tab, PO, BID, PRN N

ausea and Vomiting, Dissolve tab under tongue, #

 10 tab, 0 Refill(s)                                         Ruthie Carmichael

 

          tramadol hydrochloride 50 MG Oral Tablet [Ultram]           2017

 05:17:00           Yes                 

                                                            50 mg = 1 tab, PO, Q

6H, PRN pain, NO driving while under the influence of 

this medication, X 3 day, # 12 tab, 0 Refill(s)                                 

        Ruthie Carmichael

 

       Motrin        2017 04:58:00        No                              

   Notes: (Same as: Motrin) "Do Not 

Crush"  Give with food.                                         Ruthie Carmichael

 

                    Acetaminophen 325 MG / Hydrocodone Bitartrate 5 MG Oral Tabl

et [Norco 5/325]                     

2017 04:57:00           No                                                

Notes: (Same as: Norco 325/5)  Do not exceed 

4gm/day of acetaminophen.                                         Ruthie Martínez

nn

 

                    Budesonide 0.032 MG/ACTUAT Metered Dose Nasal Spray [Rhinoco

rt]                     2017 

15:00:00            No                                                32 microgr

am, Route: Each Affected Nostril, Drug form: 

SPRY, Daily, Dosing Weight 70.142, kg, Start date: 17 9:00:00 CST, 
Duration: 30 day, Stop date: 17 9:00:00 CST                               

          Ruthie Carmichael

 

       Omeprazole        2017 15:00:00        No                          

       40 mg, Route: PO, Drug form: DRC, 

Daily, Dosing Weight 70.142, kg, Start date: 17 9:00:00 CST, Duration: 30 
day, Stop date: 17 9:00:00 CST                                         [facility]

emilyal [facility]

 

       Fenofibrate 160 MG Oral Tablet        2017 15:00:00        No      

                           Notes: (Same 

as: Tricor)                                                 Ruthie [facility]

 

       Atenolol 50 MG Oral Tablet        2017 15:00:00        No          

                       Notes: (Same 

As:Tenormin)                                                Ruthie Mckeonann

 

       Protonix        2017 15:00:00        No                            

     Notes: Tablet should not be chewed 

or crushed. (Same as: Protonix)                                         Ruthie

 [place]

 

      cyclobenzaprine       2017 23:00:00       No                        

    Notes: (Same As: Flexeril)       

                                        Ruthie [place]

 

       fluticasone nasal 0.05 mg/inh spray        2017 18:00:00        No 

                                Notes: 

(Same as: Flonase)                                          Ruthie [facility]

 

     Omnicef      2017 18:00:00      No                       Notes: (Same

 As: Omnicef)           

Ruthie [facility]

 

       Vantin        2017 17:00:00        No                              

   200 mg, Route: PO, Drug form: TAB, 

[id number], Dosing Weight 70.142, kg, Start date: 17 11:00:00 CST, Duration: 
30 day, Stop date: 17 23:00:00 CST                                        

 Ruthie Carmichael

 

       cefpodoxime 200 MG Oral Tablet [Vantin]        2017 16:54:00       

 Yes                                200 

mg, PO, [id number], X 10 day, # 20 tab, 0 Refill(s)                                

         Ruthie [place]

 

                    Budesonide 0.032 MG/ACTUAT Metered Dose Nasal Spray [Rhinoco

rt]                     2017 

16:54:00        Yes                                = 2 spray, NASAL, Daily, # 1 

ea, 0 Refill[facility]s)               [facility]

 [place]

 

        Sodium Chloride 0.154 MEQ/ML Injectable Solution         2017 16:4

7:00         No                      

                                                    1,000 mL, Rate: 100 ml/hr, I

nfuse over: 10.1 hr, Route: IV, Dosing Weight 

70.142 kg, Total Volume: 1,011.2, Priority: NOW, Start date: 17 10:47:00 
CST, Duration: 3 day, Stop date: 02/10/17 10:46:00 CST                          

               Ruthie Carmichael

 

      Morphine       2017 11:30:00       No                            Not

es: (Same as:MORPhine Sulfate)       

                                        Ruthie Carmichael

 

       Ondansetron        2017 11:30:00        No                         

        Notes: (Same as: Zofran)   *** 

MEDICATION WASTE *** Product Size:  4 mg Product Wasted:  ___ mg                

                         Ruthie Carmichael

 

                    Acetaminophen 325 MG / Hydrocodone Bitartrate 5 MG Oral Tabl

et                     2017 

11:30:00            No                                                Notes: (Sa

me as: Norco 325/5)  Do not exceed 4gm/day of 

acetaminophen.                                              Ruthie Carmichael

 

        Sodium Chloride 0.154 MEQ/ML Injectable Solution         2017 09:4

3:00         No                      

                                                    1,000 mL, Infuse Over: 1 hr,

 Route: IV, ONCE, Priority: STAT, Dosing Weight 

67.273 kg, Start date: 17 3:43:00 CST, Duration: 1 doses or times, Stop 
date: 17 3:43:00 CST                                         Ruthie Mckeon

jinny

 

       sodium chloride 0.9% 1000 ml INJ 1,000 mL        2017 09:43:00     

   No                                 

1,000 mL, Rate: 125 ml/hr, Infuse over: 8 hr, Route: IV, Dosing Weight 67.273 
kg, Total Volume: 1,000, Start date: 17 3:43:00 CST, Duration: 30 day, 
Stop date: 17 3:42:00 CST                                         [facility]

 [place]

 

       Morphine        2017 06:46:00        No                            

     4 mg, Route: IVP, ONCE, Dosing 

Weight 67.273, kg, Priority: STAT, Start date: 17 0:46:00 CST, Stop date: 
17 0:46:00 CST                                         Ruthie Carmichael

 

        Sodium Chloride 0.154 MEQ/ML Injectable Solution         2017 05:4

3:00         No                      

                                                    1,000 mL, Infuse Over: 1 hr,

 Route: IV, ONCE, Priority: STAT, Dosing Weight 

67.273 kg, Start date: 17 23:43:00 CST, Duration: 1 doses or times, Stop 
date: 17 23:43:00 CST                                         Ruthie bejarano

 

       Saline Flush 0.9%        2017 00:08:00        No                   

              Notes: (Same as: BD 

Posiflush)                                                  Ruthie Carmichael

 

        omeprazole 40 mg oral delayed release capsule         2016 00:56:0

0         Yes                             

                40 mg = 1 cap, PO, Daily, # 30 cap, 0 Refill(s)                 

                Ruthie Carmichael

 

       Metoclopramide 10 MG Oral Tablet [Reglan]        2016 00:55:00     

   Yes                                10

 mg = 1 tab, PO, QID, X 7 day, # 28 tab, 0 Refill(s)                            

             Ruthie Carmichael

 

       ciprofloxacin 500 mg oral tablet        2016 00:53:00        Yes   

                             500 mg = 1 

tab, PO, Q12H, X 7 day, # 14 tab, 0 Refill(s)                                   

      [facility]

 

       Ciprofloxacin        2016 00:41:00        No                       

          500 mg, Route: PO, ONCE, Dosing

 Weight 71.818, kg, Priority: STAT, Start date: 16 19:41:00 CDT, Stop 
date: 16 19:41:00 CDT                                         Ruthie bejarano

 

       potassium chloride        2016 00:40:00        No                  

               40 mEq, Route: PO, Drug 

form: ERTAB, ONCE, Dosing Weight 71.818, kg, Priority: STAT, Start date: 
16 19:40:00 CDT, Stop date: 16 19:40:00 CDT                         

                Ruthie Am[facility]

 

                    Acetaminophen 325 MG / Hydrocodone Bitartrate 10 MG Oral Tab

let                     2016 

00:06:00        Yes                                1 tab, PO, BID, PRN Pain Scor

e 1-5               Ruthie [place]

 

       Fenofibrate 160 MG Oral Tablet        2016 00:05:00        Yes     

                           160 mg = 1 

tab, PO, Daily                                              Ruthie Carmichael

 

       gabapentin 300 MG Oral Capsule        2016 00:05:00        Yes     

                           300 mg = 1 

cap, PO, TID                                                [facility]

 

       cyclobenzaprine 10 mg oral tablet        2016 00:05:00        Yes  

                              10 mg = 1 

tab, PO, BID                                                [facility]

 

       rOPINIRole 0.5 mg oral tablet        2016 00:05:00        Yes      

                          0.5 mg = 1 

tab, PO, Bedtime                                            [facility]

 

       Morphine        2016 23:56:00        No                            

     4 mg, Route: IVP, ONCE, Dosing 

Weight 71.818, kg, Priority: STAT, Start date: 16 18:56:00 CDT, Stop date:
 16 18:56:00 CDT                                         [facility]

 

        Sodium Chloride 0.154 MEQ/ML Injectable Solution         2016 23:5

6:00         No                      

                                                    1,000 mL, 2,000 ml/hr, Infus

e Over: 30 minutes, Route: IV, ONCE, Priority: 

STAT, Dosing Weight 71.818 kg, Start date: 16 18:56:00 CDT, Duration: 1 
doses or times, Stop date: 16 18:56:00 CDT                                

         [facility]

 

       Ondansetron        2016 23:56:00        No                         

        4 mg, Route: IVP, Drug form: INJ,

 ONCE, Dosing Weight 71.818, kg, Priority: STAT, Start date: 16 18:56:00 
CDT, Stop date: 16 18:56:00 CDT                                         [facility]

 

       pantoprazole        2016 23:56:00        No                        

         40 mg, Route: IVP, ONCE, Dosing 

Weight 71.818, kg, Priority: STAT, Start date: 16 18:56:00 CDT, Stop date:
 16 18:56:00 CDT                                         [facility]

 

       Saline Flush 0.9%        2016 23:05:00        No                   

              Notes: (Same as: BD 

Posiflush)                                                  [facility]

 

       Atenolol 50 MG Oral Tablet        2016-06-15 19:15:00        Yes         

                       50 mg = 1 tab, 

PO, Daily, 0 Refill(s)                                         [facility]

 

                    Enalapril Maleate 10 MG / Hydrochlorothiazide 25 MG Oral Tab

let                     2016-06-15 

19:15:00        Yes                                1 tab, PO, Daily, 0 Refill(s)

               [facility]

 

        OMEGA-3 FATTY ACIDS 1,000 MG CAP         2009 00:00:00         Yes

             Hyperlipidemia         

                Take one capsule by mouth 30 minutes before meals three times everett littlejohn                                 [facility]







Immunizations





           Ordered Immunization Name Filled Immunization Name Date       Status 

    Comments   Source

 

                                        Tdap (Tetanus Toxoid, Reduced Diphtheria

 Toxoid And Acellular Pertussis, 

Absorbed)                 2019-10-03 00:00:00 [facility]

 

           Influenza, Vaccine<FLUCELVAX>(Multi-Dose)            2019-01-15 00:00

:00 Completed             

[facility]

 

           Influenza Vaccine            2013 00:00:00 Completed           

  [facility]

 

           Influenza Vaccine            2009-10-14 00:00:00 [facility]

 

                Tdap Tetanus, diphtheria, acellular pertussis Vaccine           

      2009 00:00:00 

Completed                                           [facility]

 

           Influenza Vaccine            2008 00:00:00 [facility]

 

           Influenza Vaccine            2006-10-23 00:00:00 [facility]







Vital Signs





             Vital Name   Observation Time Observation Value Comments     Source

 

             Systolic blood pressure 2020 09:23:00 123 mm[Hg]             

   [facility]

 

             Diastolic blood pressure 2020 09:23:00 85 mm[Hg]             

    [facility]

 

             Heart rate   2020 09:23:00 86 /min                   [facility]

 

             Body temperature 2020 09:23:00 36.67 Lo                 [facility]

is [facility]

 

             Respiratory rate 2020 09:23:00 18 /min                   Nicolas

[facility]

 

             Body height  2020 09:23:00 152.4 cm                  [facility]

 

             Body weight  2020 09:23:00 68.947 kg                 [facility]

 

             BMI          2020 09:23:00 29.69 kg/m2               [facility]

 

             Weight       2020 13:36:00 148 [lb_av]               [facility]

 

             BMI (Body Mass Index) 2020 13:36:00 28.0 kg/m2               

 [facility]

 

             Heart Rate   2019 05:00:00                           [facility]

 

             Temperature Oral (F) 2019 05:00:00 98.1 F                    

[facility]

 

             Systolic (mm Hg) 2019 05:00:00                           [facility]

[facility] [place]

 

             Diastolic (mm Hg) 2019 05:00:00                           [facility] [place]

 

             Respitory Rate 2019 05:00:00                           [facility]

[facility] [place]

 

             Weight       2019 22:57:00                           [facility]

 [place]

 

             BMI Calculated 2019 22:57:00                           [facility]

[facility] [place]

 

             Height       2019 22:57:00 154.94 cm                 [facility]

 

             Temperature Oral (F) 2019 22:57:00 98.5 F                    

[facility]

 

             Systolic (mm Hg) 2019 22:57:00                           [facility]

[facility] [place]

 

             Diastolic (mm Hg) 2019 22:57:00                           [facility]

[facility]

 

             Respitory Rate 2019 22:57:00                           [facility]

[facility] [place]

 

             Heart Rate   2019 22:57:00                           [facility]

 

             Respitory Rate 2018 03:03:00                           [facility]

[facility]

 

             Systolic (mm Hg) 2018 03:03:00                           [facility]

[facility] [place]

 

             Diastolic (mm Hg) 2018 03:03:00                           [facility]

[facility] [place]

 

             Heart Rate   2018 03:03:00                           [facility]

 [place]

 

             Temperature Oral (F) 2018 03:03:00 98.3 F                    

[facility] [place]

 

             Systolic (mm Hg) 2018 01:14:00                           [facility]

[facility] [place]

 

             Diastolic (mm Hg) 2018 01:14:00                           [facility]

[facility]

 

             Respitory Rate 2018 01:14:00                           [facility]

[facility] [place]

 

             Heart Rate   2018 01:14:00                           [facility]

 

             Temperature Oral (F) 2018 01:14:00 98.5 F                    

[facility] [place]

 

             Weight       2018-09-10 21:06:00                           [facility]

 [facility]

 

             BMI Calculated 2018-09-10 21:06:00                           [facility]

[facility] [place]

 

             Height       2018-09-10 21:06:00 154.94 cm                 [facility]

 

             Systolic (mm Hg) 2018-09-10 21:06:00                           [facility]

[facility] [place]

 

             Diastolic (mm Hg) 2018-09-10 21:06:00                           [facility]

[facility] [place]

 

             Heart Rate   2018-09-10 21:06:00                           [facility]

 

             Respitory Rate 2018-09-10 21:06:00                           [facility]

[facility] [place]

 

             Temperature Oral (F) 2018-09-10 21:06:00 98.3 F                    

[facility] [place]

 

             Heart Rate   2018 22:22:00                           [facility]

 

             Temperature Oral (F) 2018 22:22:00 98.6 F                    

[facility]

 

             Respitory Rate 2018 22:22:00                           [facility]

[facility] [place]

 

             Systolic (mm Hg) 2018 22:22:00                           [facility]

[facility]

 

             Diastolic (mm Hg) 2018 22:22:00                           [facility]

[facility] [place]

 

             Weight       2018 18:54:00                           [facility]

 [place]

 

             BMI Calculated 2018 18:54:00                           [facility]

[facility]

 

             Heart Rate   2018 18:54:00                           [facility]

 [place]

 

             Respitory Rate 2018 18:54:00                           [facility]

[facility]

 

             Height       2018 18:54:00 165.1 cm                  [facility]

 [facility]

 

             Temperature Oral (F) 2018 18:54:00 98.2 F                    

[facility]

 

             Systolic (mm Hg) 2018 18:54:00                           [facility]

[facility]

 

             Diastolic (mm Hg) 2018 18:54:00                           [facility]

[facility] [place]

 

             BMI Calculated 2018 19:45:00                           [facility]

[facility]

 

             Weight       2018 19:45:00                           [facility]

 [facility]

 

             Height       2018 19:45:00 154.94 cm                 [facility]

 [place]

 

             Heart Rate   2018 19:45:00                           [facility]

 [place]

 

             Systolic (mm Hg) 2018 19:45:00                           [facility]

[facility]

 

             Diastolic (mm Hg) 2018 19:45:00                           [facility]

[facility] [place]

 

             Systolic (mm Hg) 2017-12-10 18:29:00                           [facility]

[facility] [place]

 

             Diastolic (mm Hg) 2017-12-10 18:29:00                           [facility]

[facility] [place]

 

             Respitory Rate 2017-12-10 18:29:00                           [facility]

[facility]

 

             Heart Rate   2017-12-10 18:29:00                           [facility]

 [facility]

 

             Respitory Rate 2017-12-10 17:29:00                           [facility]

[facility] [place]

 

             Systolic (mm Hg) 2017-12-10 17:29:00                           [facility] [place]

 

             Diastolic (mm Hg) 2017-12-10 17:29:00                           [facility]

[facility] [place]

 

             Systolic (mm Hg) 2017-12-10 17:14:00                           [facility]

[facility]

 

             Diastolic (mm Hg) 2017-12-10 17:14:00                           [facility]

[facility]

 

             Respitory Rate 2017-12-10 17:14:00                           [facility]

[facility] [place]

 

             Heart Rate   2017-12-10 13:33:00                           [facility]

 [facility]

 

             Temperature Oral (F) 2017-12-10 13:33:00 97.7 F                    

[facility] [place]

 

             Heart Rate   2017-12-10 10:00:00                           [facility]

 [place]

 

             Temperature Oral (F) 2017-12-10 10:00:00 98.1 F                    

[facility]

 

             Temperature Oral (F) 2017-12-10 06:10:00 97.9 F                    

[facility]

 

             Height       2017 02:18:00 154.94 cm                 [facility]

 [place]

 

             Weight       2017 02:18:00                           [facility]

 [place]

 

             BMI Calculated 2017 02:18:00                           [facility]

[facility] [place]

 

             Weight       2017 19:37:00                           [facility]

 [facility]

 

             BMI Calculated 2017 19:37:00                           [facility]

[facility] [place]

 

             Height       2017 19:37:00 154.94 cm                 [facility]

 [place]

 

             Heart Rate   2017 19:37:00                           [facility]

 [place]

 

             Systolic (mm Hg) 2017 19:37:00                           [facility]

 

             Diastolic (mm Hg) 2017 19:37:00                           [facility]

[facility] [place]

 

             Temperature Oral (F) 2017 05:14:00 98.6 F                    

[facility]

 

             Respitory Rate 2017 05:14:00                           [facility]

[facility] [place]

 

             Heart Rate   2017 05:14:00                           [facility]

 [facility]

 

             Systolic (mm Hg) 2017 05:14:00                           [facility]

[facility] [place]

 

             Diastolic (mm Hg) 2017 05:14:00                           [facility]

[facility]

 

             Weight       2017 02:20:00                           [facility]

 [place]

 

             BMI Calculated 2017 02:20:00                           [facility]

al [facility]

 

             Height       2017 02:20:00 152.4 cm                  [facility]

 [place]

 

             Temperature Oral (F) 2017 02:20:00 98.6 F                    

[facility]

 

             Respitory Rate 2017 02:20:00                           [facility]

[facility] [place]

 

             Heart Rate   2017 02:20:00                           [facility]

 [place]

 

             Systolic (mm Hg) 2017 02:20:00                           [facility]

[facility] [place]

 

             Diastolic (mm Hg) 2017 02:20:00                           [facility]

[facility] [place]

 

             Systolic (mm Hg) 2017 17:18:00                           [facility]

[facility] [place]

 

             Diastolic (mm Hg) 2017 17:18:00                           [facility]

[facility] [place]

 

             Heart Rate   2017 17:18:00                           [facility]

 [place]

 

             Respitory Rate 2017 17:18:00                           [facility]

[facility] [place]

 

             Temperature Oral (F) 2017 17:18:00 97.9 F                    

[facility] [place]

 

             Heart Rate   2017 16:03:00                           [facility]

 [place]

 

             Systolic (mm Hg) 2017 16:03:00                           [facility]

[facility] [place]

 

             Diastolic (mm Hg) 2017 16:03:00                           [facility]

[facility] [place]

 

             Weight       2017 15:17:00                           [facility]

 [place]

 

             Systolic (mm Hg) 2017 13:20:00                           [facility]

[facility] [place]

 

             Diastolic (mm Hg) 2017 13:20:00                           [facility]

[facility] [place]

 

             Respitory Rate 2017 13:20:00                           [facility]

al [place]

 

             Temperature Oral (F) 2017 13:20:00 97.9 F                    

[facility]

 

             Heart Rate   2017 13:20:00                           [facility]

 [place]

 

             BMI Calculated 2017 12:04:00                           [facility]

al [place]

 

             Height       2017 12:04:00 154.94 cm                 [facility]

 [place]

 

             Weight       2017 12:04:00                           [facility]

 [place]

 

             Temperature Oral (F) 2017 11:45:00 97.9 F                    

[facility] [place]

 

             Respitory Rate 2017 11:45:00                           [facility]

al [place]

 

             BMI Calculated 2017 23:54:00                           [facility]

[facility] [place]

 

             Weight       2017 23:54:00                           [facility]

 [place]

 

             Height       2017 23:54:00 154.94 cm                 [facility]

 [place]

 

             Respitory Rate 2016 02:15:00                           [facility]

al [place]

 

             Systolic (mm Hg) 2016 02:15:00                           [facility]

[facility] [place]

 

             Diastolic (mm Hg) 2016 02:15:00                           [facility]

[facility] [place]

 

             Heart Rate   2016 02:15:00                           [facility]

 [place]

 

             Temperature Oral (F) 2016 02:15:00 98.1 F                    

[facility]

 

             Systolic (mm Hg) 2016 23:57:00                           [facility]

[facility] [place]

 

             Diastolic (mm Hg) 2016 23:57:00                           [facility]

[facility] [place]

 

             Heart Rate   2016 23:57:00                           [facility]

 [place]

 

             Respitory Rate 2016 23:57:00                           [facility]

al [place]

 

             Weight       2016 23:01:00                           [facility]

 [place]

 

             Respitory Rate 2016 23:01:00                           [facility]

[facility] [place]

 

             Heart Rate   2016 23:01:00                           [facility]

 [place]

 

             Systolic (mm Hg) 2016 23:01:00                           [facility]

[facility] [place]

 

             Diastolic (mm Hg) 2016 23:01:00                           [facility]

[facility] [place]

 

             BMI Calculated 2016 23:01:00                           [facility]

[facility] [place]

 

             Height       2016 23:01:00 154.94 cm                 [facility]

 [place]

 

             Weight       2016 15:47:00                           [facility]

 [place]

 

             BMI Calculated 2016 15:47:00                           [facility]

al [place]

 

             Height       2016 15:47:00 154.94 cm                 [facility]

 [place]

 

             Weight       2016-06-15 19:12:00                           [facility]

 [place]

 

             BMI Calculated 2016-06-15 19:12:00                           [facility]

[facility] [place]

 

             Height       2016-06-15 19:12:00 154.94 cm                 [facility]

 [place]

 

             Heart Rate   2016-06-15 19:12:00                           [facility]

 [place]

 

             Systolic (mm Hg) 2016-06-15 19:12:00                           [facility]

[facility] [place]

 

             Diastolic (mm Hg) 2016-06-15 19:12:00                           [facility]

[facility] [place]







Procedures





                Procedure       Date / Time Performed Performing Clinician Sourprerna

e

 

                ELECTROLYTES    2020 10:59:00 Elliot Nina



 

                CBC/DIFF        2020 10:59:00 Elliot Nina



 

                GLUCOSE         2020 10:59:00 Elliot Nina



 

                LIPID PROFILE   2020 10:59:00 Elliot Nina



 

                UREA NITROGEN/CREATININE 2020 10:59:00 Elliot Nina [facility]

[facility]

 

                LIVER PROFILE   2020 10:59:00 Elliot Nina [facility]

 

                HIV AG/AB COMBO ROUTINE SCREENING 2020 10:59:00 [facility] Nina

omas W [facility]

 

                HEPATITIS PANEL 2020 10:59:00 Elliot Nina [facility]

 

                CALCIUM         2020 10:59:00 Elliot Nina [facility]

 

                VIT D, 25-HYDROXY 2020 10:59:00 Elliot Nina 

alth

 

                CBC             2020 10:59:00 Elliot Nina [facility]

 

                X-ray of chest, single view 2019 00:00:00 MYRA GRACE 

[facility]

 

                Lengthening of hamstring tendon 1962 00:00:00             

    [facility]

 

                Appendectomy                                    [facility]

 

                CS - Caesarean section                                 [facility]

 

                Lumpectomy of breast                                 [facility]







Plan of Care





             Planned Activity Planned Date Details      Comments     Source

 

                    Future Scheduled Test 2025 00:00:00 Screening for juan j

gnant neoplasm of 

cervix (procedure) [code = 613528314]                           [facility] Scheduled Test 2023 00:00:00 Screening for juan j

gnant neoplasm of 

colon (procedure) [code = 182799958]                           [facility] Scheduled Test 2021 00:00:00 Breast Cancer Scrn

 (Yearly) [code = 

Breast Cancer Scrn (Yearly)]                           [facility] Scheduled Test 1986 00:00:00 Screening for juan j

gnant neoplasm of 

cervix (procedure) [code = 959699446]                           [facility]

 

             Instructions              Back Pain                 [facility]







Encounters





             Start Date/Time End Date/Time Encounter Type Admission Type Attendi

[facility]   Care Department Encounter ID    Source

 

        2020 09:00:23 2020 09:00:23 Outpatient MARK FAN     

[id number]                              [facility]

 

        2020 00:00:00 2020 00:00:00 Outpatient                 [facility]     [id number] [facility]

 

        2020 00:00:00 2020 00:00:00 Outpatient                 [facility]     [id number] [facility]

 

        2020 10:58:47 2020 10:58:47 Outpatient                 [facility]     [id number] [facility]

 

        2020 09:20:01 2020 09:20:01 Outpatient                 [facility]     [id number] [facility]

 

        2020 00:00:00 2020 00:00:00 Outpatient                 [facility]     [id number] [facility]

 

        2020 13:18:53 2020 13:18:53 Outpatient                 [facility]     [id number] [facility]

 

        2020 00:00:00 2020 00:00:00 Outpatient                 [facility]     [id number] [facility]

 

        2020 12:09:17 2020 12:09:17 Outpatient                 [facility]     [id number] [facility]

 

        2020 00:00:00 2020 00:00:00 Outpatient                 [facility]     [id number] [facility]

 

          2020 13:17:00 2020 14:14:00 Departed Emergency Room       

              [facility] [id number]              [facility]

 

        2019 09:01:59 2019 09:01:59 Outpatient                 [facility]     [id number] [facility]

 

           2019 13:19:00 2019 12:46:00 Discharged Inpatient 1       

   MYRA GRACE 

[facility] [id number]        [facility]

 

        2019 09:58:11 2019 09:58:11 Outpatient                 [facility]     [id number] [facility]

 

        2019 00:00:00 2019 00:00:00 Outpatient                 [facility]     [id number] [facility]

 

          2019 12:40:00 2019 12:40:00 Outpatient                    

 Dae EDWARDS                [id number]                    [facility]

 

        2019-10-29 00:00:00 2019-10-29 00:00:00 Outpatient                 [facility]     [id number] [facility]

 

        2019-10-16 08:37:13 2019-10-16 08:37:13 Outpatient                 [facility]     [id number] [facility]

 

        2019-10-08 09:18:04 2019-10-08 09:18:04 Outpatient                 [facility]     [id number] [facility]

 

        2019-10-08 00:00:00 2019-10-08 00:00:00 Outpatient                 [facility]     [id number] [facility]

 

        2019-10-03 10:08:25 2019-10-03 10:08:25 Outpatient                 [facility]     [id number] [facility]

 

        2019-10-03 00:00:00 2019-10-03 00:00:00 Outpatient                 [facility]     [id number] [facility]

 

        2019-10-03 00:00:00 2019-10-03 00:00:00 Outpatient                 [facility]     [id number] [facility]

 

          2019 13:15:00 2019 23:59:00 Outpatient           Venus Tran      [facility]

                          [id number]               

 

        2019 13:15:00 2019 13:15:00 Outpatient                 [facility] 

   [facility]    [id number]    [facility]

 

        2019 00:00:00 2019 00:00:00 Outpatient                 [facility]     [id number] [facility]

 

        2019 12:09:06 2019 12:09:06 Outpatient                 [facility]     [id number] [facility]

 

        2019 10:50:32 2019 10:50:32 Outpatient                 [facility]     [id number] [facility]

 

        2019 00:00:00 2019 00:00:00 Outpatient                 [facility]     [id number] [facility]

 

        2019 00:00:00 2019 00:00:00 Outpatient                 [facility]     [id number] [facility]

 

        2019 00:00:00 2019 00:00:00 Outpatient                 [facility]     [id number] [facility]

 

        2019-01-15 08:52:48 2019-01-15 08:52:48 Outpatient                 [facility]     [id number] [facility]

 

           2019 16:51:00 2019 23:00:00 Outpatient            Helene Gracia [facility]

                    [facility]                [id number]         

 

        2018 09:09:56 2018 09:09:56 Outpatient                 [facility]     [id number] [facility]

 

        2018-10-29 00:00:00 2018-10-29 00:00:00 Outpatient                 [facility]     [id number] [facility]

 

        2018-10-19 00:00:00 2018-10-19 00:00:00 Outpatient                 [facility]     [id number] [facility]

 

        2018-10-15 10:15:29 2018-10-15 10:15:29 Outpatient                 [facility]     [id number] [facility]

 

             2018 11:19:00 2018 13:30:00 Departed Emergency Room 1  

          INDY GARCIA        [facility]          [id number]    [facility]

 

        2018 00:00:00 2018 00:00:00 Outpatient                 [facility]     [id number] [facility]

 

        2018-09-10 15:56:00 2018-09-10 23:13:00 Outpatient         Josette Ochoa [facility]    

[id number]                             

 

        2018-09-10 00:00:00 2018-09-10 00:00:00 Outpatient                 [facility]     [id number] [facility]

 

        2018 00:00:00 2018 00:00:00 Outpatient                 [facility]     [id number] [facility]

 

        2018 00:00:00 2018 00:00:00 Outpatient                 [facility]     [id number] [facility]

 

        2018 00:00:00 2018 00:00:00 Outpatient                 [facility]     [id number] [facility]

 

        2018 09:38:22 2018 09:38:22 Outpatient                 [facility]     [id number] [facility]

 

        2018 10:10:47 2018 10:10:47 Outpatient                 [facility]     [id number] [facility]

 

          2018 13:48:00 2018 17:37:00 Outpatient           Vivek Moses [facility]

                          [id number]               

 

        2018 08:36:48 2018 08:36:48 Outpatient                 [facility]     [id number] [facility]

 

        2018 10:54:33 2018 10:54:33 Outpatient                 [facility]     [id number] [facility]

 

        2018 00:00:00 2018 00:00:00 Outpatient                 [facility]     [id number] [facility]

 

        2018 12:09:28 2018 12:09:28 Outpatient                 [facility]     [id number] [facility]

 

        2018 00:00:00 2018 00:00:00 Outpatient                 [facility]     [id number] [facility]

 

        2018 14:44:22 2018 14:44:22 Outpatient                 [facility]     [id number] [facility]

 

        2018 00:00:00 2018 00:00:00 Outpatient                 [facility]     [id number] [facility]

 

        2018 00:00:00 2018 00:00:00 Outpatient                 [facility]     [id number] [facility]

 

          2018 08:30:00 2018 08:30:00 Outpatient           Eugenio Funes

joel Arun [facility]IS[facility] 

[facility]ISCHER                 [id number]               

 

        2018 12:11:00 2018 23:59:59 Outpatient                 [facility]

SUE MHMISCHER 

[id number]                             

 

        2018 12:10:00 2018 23:59:59 Outpatient                 [facility]

SUE MHMISCHER 

[id number]                             

 

           2018 13:45:00 2018 23:59:59 Outpatient            Jefferson Zuniga 

M[facility]ISCHER           [facility]           [id number]         

 

        2018 10:07:00 2018 23:59:59 Outpatient                 [facility]

SUE MHMISCHER 

[id number]                             

 

        2017 15:26:00 2017 23:59:59 Outpatient                 [facility]

SUE MHMISCHER 

[id number]                             

 

        2017 11:12:00 2017 23:59:59 Outpatient                 [facility]

SUE MHMISCHER 

[id number]                             

 

        2017 16:50:00 2017 23:59:59 Outpatient                 [facility]

SUE MHMISCHER 

[id number]                             

 

        2017 09:53:00 2017-12-15 23:59:59 Outpatient                 [facility]

SUE MHMISCHER 

[id number]                             

 

          2017 20:07:00 2017-12-10 13:35:00 Outpatient           Haresh DonisSE      

[facility]                      [id number]               

 

           2017 09:27:00 2017 23:59:00 Outpatient            Jefferson Zuniga [facility]

                    [facility]               [id number]         

 

           2017 14:30:00 2017 23:59:59 Outpatient            [facility] 

[facility]           [id number]         

 

        2017-10-19 00:00:00 2017-10-19 00:00:00 Outpatient                 [facility]     [id number] [facility]

 

           2017 21:12:00 2017 00:24:00 Out[facility]            [facility]                [id number]         

 

        2017-08-10 09:17:59 2017-08-10 09:17:59 Outpatient                 [facility]     [id number] [facility]

 

        2017 08:13:37 2017 08:13:37 Outpatient                 [facility]     [id number] [facility]

 

        2017 09:23:15 2017 09:23:15 Outpatient                 [facility]     [id number] [facility]

 

          2017 17:37:00 2017 12:18:00 Outpatient           Haresh Donis [facility]                      [id number]               

 

        2016 17:54:00 2016 21:35:00 Outpatient         Eleuterio Alonzo [facility]    

[id number]                             

 

             2016 08:48:00 2016 23:59:00 Outpatient                Daniel Granados

                [facility]            [id number]     

 

             2016-06-15 13:47:00 2016-06-15 23:59:00 Outpatient                P

Daniel landry

                [facility]           [id number]     







Results





           Test Description Test Time  Test Comments Results    Result Comments 

Source

 

                    Vitamin D, 25-Hydroxycalciferol 2020 07:17:00   

 

                                        Test Item

 

             Vit D, 25-Hydroxy (test code = 18019591) 13.5 ng/mL          

L             

 

             Vitamin D Interpretation (test code = 41481370) Deficient    Suffic

ient   A             

Sufficient:    >30.0Insufficient:  20.0 - 29.9Deficient:     <20.0

 

             Lab Interpretation (test code = 10508-4) Abnormal                  

              





[facility]/Uffumm[facility]glp6268-70-53 21:30:00* 



             Test Item    Value        Reference Range Interpretation Comments

 

             Urea Nitrogen (test code = 61540245) 11.0 mg/dL   7-25             

          

 

             Creatinine (test code = 86705473) 0.4 mg/dL    0.6-1.2      L      

       

 

             GFR, Estimated (test code = 93233752) >90          >=90 mL/min/1.73

 m2               

 

             Lab Interpretation (test code = 82356-9) Abnormal                  

              





[facility] Wergvqz3599-92-50 21:30:00* 



             Test Item    Value        Reference Range Interpretation Comments

 

             Cholesterol (test code = 2093-3) 196.0 mg/dL  <=200.0              

      

 

             Triglyceride (test code = 52516219) 212 mg/dL    <150         H    

         

 

             HDL (test code = 2085-9) 41.0 mg/dL   See Reference Range Narrative

.               

 

             LDL (test code = 98544-0) 113 mg/dL    <100         H            Op

timal: < 100.0 mg/dLNear Optimal: 

120-129 mg/dLBorderline: 130-159 mg/dLHigh: 160-189 mg/dLVery High: >=190 mg/dL

 

             Patient Fasting? (test code = 40724648) Yes                        

             

 

             Lab Interpretation (test code = 55782-4) Abnormal                  

              





[facility] Services[facility] Hphxcqf7236-01-80 21:30:00* 



             Test Item    Value        Reference Range Interpretation Comments

 

             Bilirubin, Total (test code = 2885-2) 0.3 mg/dL    0.2-1.2         

           

 

             Alkaline Phosphatase (test code = 01639828) 88 U/L           

                 

 

             AST (test code = 26091542) 22 U/L       13-39                      

 

             Direct Bilirubin (test code = 1968-7) 0.1 mg/dL    0-0.2           

           

 

             ALT (test code = 35040745) 15 U/L       7-52                       

 

             Albumin (test code = 51903-3) 4.0 g/dL     3.7-5.3                 

   

 

             Lab Interpretation (test code = 36882-9) Normal                    

              





[place] VymjafSsezbed2027-55-14 21:29:00* 



             Test Item    Value        Reference Range Interpretation Comments

 

             Calcium (test code = 62117831) 9.5 mg/dL    8.6-10.3               

    

 

             Lab Interpretation (test code = 16272-4) Normal                    

              





[place] QnkipdChhvdlj2629-75-15 21:29:00* 



             Test Item    Value        Reference Range Interpretation Comments

 

             Glucose (test code = 87716590) 157 mg/dL           H         

    

 

             Lab Interpretation (test code = 13815-5) Abnormal                  

              





[facility] ServicesEnefrnCgjpmenkoabx6605-12-49 21:29:00* 



             Test Item    Value        Reference Range Interpretation Comments

 

             Sodium (test code = 2951-2) 143 mmol/L   136-145                   

 

 

             Potassium (test code = 2823-3) 3.6 mmol/L   3.5-5.1                

    

 

             Chloride (test code = 2075-0) 106 mmol/L                     

   

 

             CO2 (test code = 54311310) 25 mmol/L    21-31                      

 

             Anion Gap (test code = 26595586) 12 mmol/L    5-16                 

      

 

             Lab Interpretation (test code = 84717-2) Normal                    

              





[facility] ServicesHepatitis Euefe8057-21-19 21:09:00* 



             Test Item    Value        Reference Range Interpretation Comments

 

             Hepatitis C Virus (HCV) Antibody (test code = 86732-1) Negative    

 Negative                   

 

             Hep B Surface Ag (test code = 5196-1) Negative     Negative        

           

 

             Hep A Vir Ab IgM (test code = 89736-4) Negative     Negative       

            

 

             Hep B Core Ab IgM (test code = 24113-3) Negative     Negative      

             

 

             Lab Interpretation (test code = 23501-6) Normal                    

              





[facility] ServicesHIV-1/HIV-2 Routine Vxitwgpgm7070-47-85 20:59:00* 



             Test Item    Value        Reference Range Interpretation Comments

 

             HIV Ag/Ab Combo (test code = 41906-9) Negative     Negative        

           

 

             Lab Interpretation (test code = 56879-7) Normal                    

              





[facility] ServicesCBC/Pakz3459-46-27 20:24:00* 



             Test Item    Value        Reference Range Interpretation Comments

 

             WBC (test code = 6690-2) 4.8 K/uL     4.5-11                     

 

             RBC (test code = 789-8) 3.65         4.20- 5.40 M/uL L             

 

             Hemoglobin (test code = 718-7) 10.6 g/dL    12-16        L         

    

 

             Hematocrit (test code = 4544-3) 33.9 %       37-47        L        

     

 

             MCV (test code = 787-2) 92.9 fL      82-92        H             

 

             MCH (test code = 785-6) 29.0 pg      27-32                      

 

             MCHC (test code = 786-4) 31.3 g/dL    32-36        L             

 

             RDW (test code = 02803-5) 48.0 fL      36.4-46.3    H             

 

             Platelet (test code = 777-3) 374 K/uL     150-400                  

  

 

             Mean Platelet Volume (test code = 58369-2) 10.3 fL      9.4-12.4   

                

 

             Percent NRBC (test code = 56250296) 0.0 %                          

         

 

             Neutrophil (test code = 770-8) 51.1 %       34-70                  

    

 

             Lymphs (test code = 736-9) 34.3 %       20-50                      

 

             Monocytes (test code = 5905-5) 9.8 %        5-12                   

    

 

             Eos (test code = 713-8) 2.7 %        0.7-5                      

 

             Basos (test code = 706-2) 1.9 %        0.1-1.2      H             

 

             Immature Granulocytes (test code = 87707007) 0.2 %        0-0.5    

                  

 

             Neutrophils (Absolute) (test code = 37997527) 2.46 K/uL    1.56-6.1

3                  

 

             Lymphs (Absolute) (test code = 72254222) 1.65 K/uL    1.18-3.74    

              

 

             Monocytes(Absolute) (test code = 30588681) 0.47 K/uL    0.24-0.36  

  H             

 

             Eos (Absolute) (test code = 70335117) 0.13 K/uL    0.04-0.36       

           

 

             Baso (Absolute) (test code = 04926174) 0.09 K/uL    0.01-0.08    H 

            

 

             Immature Grans (Abs) (test code = 45362589) 0.01 K/uL    0-0.03    

                 

 

             Absolute NRBC (test code = 27511357) 0.00 K/uL                     

          

 

             Lab Interpretation (test code = 85245-6) Abnormal                  

              





[facility] [id number] 15:23:00* 



             Test Item    Value        Reference Range Interpretation Comments

 

             SODIUM (test code = NA) 143 mmol/L   136-145      N             

 

             POTASSIUM (test code = K) 3.5 mmol/L   3.5-5.1      N             

 

             CHLORIDE (test code = CL) 103 mmol/L   101-109      N             

 

             CARBON DIOXIDE (test code = CO2) 25.1 mmol/L  21-32        N       

      

 

             ANION GAP (test code = GAP) 18 mmol/L    10-20        N            

 

 

             GLUCOSE (test code = GLU) 308 mg/dL           H             

 

             BLOOD UREA NITROGEN (test code = BUN) 16 mg/dL     3-21         N  

           

 

             CREATININE (test code = CREAT) 0.75 mg/dL   0.55-1.3     N         

    

 

             BUN/CREATININE RATIO (test code = BUN/CREA) 21.3         10-20     

   H             

 

             TOTAL PROTEIN (test code = PROT) 7.3 g/dL     6.5-8.4      N       

      

 

             ALBUMIN (test code = ALB) 3.3 g/dL     3.4-4.8      L             

 

             GLOBULIN (test code = GLOB) 4.0 G/DL     1-10         N            

 

 

             ALBUMIN/GLOBULIN RATIO (test code = A/G) 0.83 RATIO   0.75-1.50    

N             

 

             CALCIUM (test code = CA) 9.4 mg/dL    8.4-10.2     N             

 

             BILIRUBIN TOTAL (test code = BILT) 0.80 mg/dL   0.0-1.0      N     

        

 

             SGOT/AST (test code = AST) 28 U/L       6-32         N             

 

             SGPT/ALT (test code = ALT) 36 U/L       12-78        N            N

ote: Change in REFERENCE RANGE due to

new reagent method.

 

             ALKALINE PHOSPHATASE TOTAL (test code = ALKP) 82 U/L         

     N             





[id number] 15:23:00* 



             Test Item    Value        Reference Range Interpretation Comments

 

             LIPASE (test code = LIP) 95 U/L       128-270      L             





COMPREHENSIVE METABOLIC [id number] 15:21:00* 



             Test Item    Value        Reference Range Interpretation Comments

 

             SODIUM (test code = NA) 143 mmol/L   136-145      N             

 

             POTASSIUM (test code = K) 3.5 mmol/L   3.5-5.1      N             

 

             CHLORIDE (test code = CL) 103 mmol/L   101-109      N             

 

             CARBON DIOXIDE (test code = CO2) 25.1 mmol/L  21-32        N       

      

 

             ANION GAP (test code = GAP) 18 mmol/L    10-20        N            

 

 

             GLUCOSE (test code = GLU) 308 mg/dL           H             

 

             BLOOD UREA NITROGEN (test code = BUN) 16 mg/dL     3-21         N  

           

 

             CREATININE (test code = CREAT) 0.75 mg/dL   0.55-1.3     N         

    

 

             BUN/CREATININE RATIO (test code = BUN/CREA) 21.3         10-20     

   H             

 

             TOTAL PROTEIN (test code = PROT)  gram/dL     6.4-8.2              

      

 

             ALBUMIN (test code = ALB)  g/dL        3.4-5.0                    

 

             GLOBULIN (test code = GLOB)  g/dL        2.7-4.2                   

 

 

             ALBUMIN/GLOBULIN RATIO (test code = A/G)              0.75-1.50    

              

 

             CALCIUM (test code = CA) 9.4 mg/dL    8.4-10.2     N             

 

             BILIRUBIN TOTAL (test code = BILT)  mg/dL       0.2-1.2            

        

 

             SGOT/AST (test code = AST)  IUnit/L     15-37                      

 

             SGPT/ALT (test code = ALT)  U/L         10-69                      

 

             ALKALINE PHOSPHATASE TOTAL (test code = ALKP)  IUnit/L       

                   





[id number] 15:21:00* 



             Test Item    Value        Reference Range Interpretation Comments

 

             LIPASE (test code = LIP)  Unit/L      144-286                    





- CT L-SPINE W/O [id number] 15:11:00  Name: JULIO YOUNG             
     [facility]     : 1955 Age/S: 64  / F         
[address]           Unit #: [id number]     Loc:               
[place] [address]                Phys: Emily Jones MD                       
                           Acct: [id number]  Dis Date:               Status: 
REG ER                                  PHONE #: [phone]     Exam Date: 
2020  8185                     FAX #: [phone]      Reason: L hip and 
L back pain, s/p fall                     EXAMS:                                
              CPT CODE:      [id number] CT L-SPINE W/O CONTRAST                  
 [address]                    EXAM: CT of the lumbar spine without contrast;        
      INFORMATION: Lower back pain after fall;               TECHNIQUE AND 
FINDINGS:       CT dose reduction protocol;       2.5 mm axial scans; sagittal 
and coronal reconstructions.       There is good alignment of the lumbar spine. 
     Diffuse osteoporosis.       There is moderate wedge-shaped deformity of the
L1 vertebral body with       cortical disruption along the superior endplate and
the anterior       aspect of the vertebral body and with 20% height reduction 
anteriorly.       The remainder of the lumbar vertebrae as well as T11 and 12 
are       intact.       There is significant narrowing of disc spaces T10-T11 
and to a lesser       degree L1/2 and L3/4.       Advanced degenerative changes 
of facet joints in the lower lumbar       spine with intra-articular vacuum 
phenomenon.       Paravertebral soft tissues are unremarkable.       Calcified 
plaques in the abdominal aorta and iliac arteries.       Large right renal 
calculus as described before.                 IMPRESSION:         1.  Diffuse 
osteoporosis and wedge-shaped compression fracture of L1         which is 
probably acute to subacute.  Recommend clinical correlation         and consi
deration for an MRI scan of the thoracolumbar spine.         2.  Advanced degene
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
e disc disease of the lumbar spine.         3.  Advanced facet joint arthropathy
in the lower lumbar spine.                   Location code: [facility]                  
** Electronically Signed by CELESTE Calix **           **             on 
2020 at 1511             **                      Reported and signed by: 
Bernardo Calix M.D.       CC: Emily Jones MD; Elliot Nina M.D.        
                                                                                
      Technologist:ANGEL MCINTYRE, (R),CT       CTDI:        DLP:        T
rnscb Date/Time: 2020 () [email or web address]                        Orig Print D
/T: S: 2020 ()      PAGE  1                       Signed Report       
                       - CT L-SPINE W/O [id number] 15:11:00  Name: 
JULIO YOUNG                   [facility]     : 
1955 Age/S: 64  / F         [address]           Unit #: [id number]
[id number]     Loc:               [place] [address]                Phys: Mike Jones MD                                                   Acct: [id number]  Di
s Date:               Status: [facility] ER                                  PHONE #: [phone]
[phone]     Exam Date: 2020  1455                     FAX #: [phone]
[phone]      Reason: L hip and L back pain, s/p fall                     EXAMS:     
                                         CPT CODE:      [id number] CT L-SPINE W/O
CONTRAST                    [address]                    EXAM: CT of the lumbar sp
ine without contrast;               INFORMATION: Lower back pain after fall;    
          TECHNIQUE AND FINDINGS:       CT dose reduction protocol;       2.5 mm
axial scans; sagittal and coronal reconstructions.       There is good alignment
of the lumbar spine.       Diffuse osteoporosis.       There is moderate wedge-
shaped deformity of the L1 vertebral body with       cortical disruption along 
the superior endplate and the anterior       aspect of the vertebral body and w
ith 20% height reduction anteriorly.       The remainder of the lumbar vertebrae
as well as T11 and 12 are       intact.       There is significant narrowing of 
disc spaces T10-T11 and to a lesser       degree L1/2 and L3/4.       Advanced 
degenerative changes of facet joints in the lower lumbar       spine with intra-
articular vacuum phenomenon.       Paravertebral soft tissues are unremarkable. 
     Calcified plaques in the abdominal aorta and iliac arteries.       Large r
ight renal calculus as described before.                 IMPRESSION:         1. 
Diffuse osteoporosis and wedge-shaped compression fracture of L1         which 
is probably acute to subacute.  Recommend clinical correlation         and consi
deration for an MRI scan of the thoracolumbar spine.         2.  Advanced degene
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
e disc disease of the lumbar spine.         3.  Advanced facet joint arthropathy
in the lower lumbar spine.                   Location code:                   
** Electronically Signed by CELESTE Calix **           **             on 
2020 at 1511             **                      Reported and signed by: 
Bernardo Calix M.D.       CC: Emily Jones MD; Elliot Nina M.D.        
                                                                                
      Technologist:ANGEL MCINTYRE RT(R),CT       CTDI:        DLP:        T
rnscb Date/Time: 2020 () [email or web address].GRW                        Orig Print D
/T: S: 2020 (1585)      PAGE  1                       Signed Report       
                       CBC W/AUTO [id number] 15:07:00* 



             Test Item    Value        Reference Range Interpretation Comments

 

             WHITE BLOOD CELL (test code = WBC) 16.9 K/mm3   4.5-12.5     H     

        

 

             RED BLOOD CELL (test code = RBC) 4.29 mill/mm3 3.7-5.2      N      

       

 

             HEMOGLOBIN (test code = HGB) 13.4 gram/dL 11.5-15.5    N           

  

 

             HEMATOCRIT (test code = HCT) 41.6 %       36.0-46.0    N           

  

 

             MEAN CELL VOLUME (test code = MCV) 97.0 fL      80-98        N     

        

 

             MEAN CELL HGB (test code = MCH) 31.2 picogram 27.0-33.0    N       

      

 

             MEAN CELL HGB CONCETRATION (test code = MCHC) 32.2 gram/dL 33.0-36.

0    L             

 

             RED CELL DISTRIBUTION WIDTH (test code = RDW) 13.2 %       11.6-16.

2    N             

 

             RED CELL DISTRIBUTION WIDTH SD (test code = RDW-SD) 48.0 fL      37

.0-51.0    N             

 

             PLATELET COUNT (test code = PLT) 246 K/mm3    150-450      N       

      

 

             MEAN PLATELET VOLUME (test code = MPV) 9.3 fL       6.7-11.0     N 

            

 

             NEUTROPHIL % (test code = NT%) 87.9 %       39.0-69.0    H         

    

 

             LYMPHOCYTE % (test code = LY%) 5.0 %        25.0-55.0    L         

    

 

             MONOCYTE % (test code = MO%) 6.3 %        0.0-10.0     N           

  

 

             EOSINOPHIL % (test code = EO%) 0.0 %        0.0-5.0      N         

    

 

             BASOPHIL % (test code = BA%) 0.4 %        0.0-1.0      N           

  

 

             NEUTROPHIL # (test code = NT#) 14.83 K/mm3  1.8-7.7      H         

    

 

             LYMPHOCYTE # (test code = LY#) 0.85 K/mm3   1.0-5.0      L         

    

 

             MONOCYTE # (test code = MO#) 1.07 K/mm3   0-0.8        H           

  

 

             EOSINOPHIL # (test code = EO#) 0.00 K/mm3   0.0-0.5      N         

    

 

             BASOPHIL # (test code = BA#) 0.06 K/mm3   0.0-0.2      N           

  

 

             MANUAL DIFF REQUIRED (test code = MDIFF) NO                        

              





- CT ABD PELVIS W/O [id number] 15:07:00  Name: JULIO YOUNG              
    [facility]     : 1955 Age/S: 64  / F         
[address]           Unit #: [id number]     Loc:               
[place] [address]                Phys: Emily Jones MD                       
                           Acct: [id number]  Dis Date:               Status: 
REG ER                                  PHONE #: [phone]     Exam Date: 
2020  [facility]                     FAX #: [phone]      Reason: L hip and 
L back pain, s/p fall                     EXAMS:                                
              CPT CODE:      [id number] CT ABD PELVIS W/O CONT                   
 114[address]                    EXAM: CT of the abdomen and pelvis without contrast;  
            INFORMATION: Left hip and left back pain after fall;               
TECHNIQUE AND FINDINGS:       CT dose reduction protocol;       5 mm cuts 
through the abdomen and pelvis without contrast.       Liver shows decreased 
attenuation; no focal lesions.       No abnormalities of the biliary system.    
  Atrophic pancreas.       Spleen and adrenal glands unremarkable.       A 
large, densely calcified stone is seen in the lower portion of the       right 
renal collecting system.  It measures 10 mm in diameter and 440       Hounsfield
units in density.  There is also a small parenchymal       calcification 
posteriorly in the right kidney and there is localized       parenchymal atrophy
nearby.       A 2 cm cyst is seen along the anterior aspect of the right kidney.
      The left kidney is of normal size and shape; no hydronephrosis or       
stones.       There is dilatation of the stomach.  Otherwise, no bowel       
abnormalities.       No pelvic mass lesions.       Sagittal reconstructions of 
the lumbar spine show cortical       irregularity along the anterior aspect of 
the L1 vertebral body with       20% height reduction.       Lung bases are 
clear.                 IMPRESSION:         1.  No evidence of acute traumatic ch
anges of the abdomen or pelvis.         2.  Large nonobstructing calyceal stone 
in the right kidney,         associated with focal parenchymal atrophy/scarring 
and small         parenchymal calcification.         3.  Gastric distention; cau
se is not clear.         4.  Wedge-shaped compression fracture of L1.  This may 
be acute or         subacute.  Recommend correlation with MRI.                  
Location code: [facility]                   ** Electronically Signed by CELESTE ocasio **           **             on 2020 at 1035             **          
           Reported and signed by: Bernardo Calix M.D.        PAGE  1         
             Signed Report                    (CONTINUED)   Name: TARAN YOUNG                   [facility]     : 1955 Age/S: 
64  / F         [address]           Unit #: [id number]     Loc:     
         [place] [address]                Phys: Emily Jones MD              
                                    Acct: [id number]  Dis Date:               
Status: REG ER                                  PHONE #: [phone]     Exam 
Date: 2020  8875                     FAX #: [phone]      Reason: L h
ip and L back pain, s/p fall                     EXAMS:                         
                     CPT CODE:      [id number] CT ABD PELVIS W/O CONT            
        [address]               <Continued>                                       
CC: Emily Jones MD; Elliot Nina M.D.                                    
                                                           
Technologist:ANGEL MCINTYRE RT(R),CT       CTDI:        DLP:        Trnscb 
Date/Time: 2020 (1507) [email or web address]                        Orig Print D/T: S:
2020 (8697)      PAGE  2                       Signed Report              
                Creatine Kinase [id number] 07:27:00* 



             Test Item    Value        Reference Range Interpretation Comments

 

             Creatine Kinase MB (test code = 96610-9) 2.60         0-5.0        

              





[facility]rop[facility]21[facility]92 07:27:00* 



             Test Item    Value        Reference Range Interpretation Comments

 

             Troponin I (test code = AMD0232) 0.056        0-0.300              

      





[facility] Mhzjt5932-13-70 07:21:00* 



             Test Item    Value        Reference Range Interpretation Comments

 

             Sodium Level (test code = 2951-2) 138          136-145             

       





[facility] RenaissancePotassium Dnjdg4801-94-85 07:21:00* 



             Test Item    Value        Reference Range Interpretation Comments

 

             Potassium Level (test code = 2823-3) 3.8          3.5-5.1          

          





[facility] RenaissanceChloride Ignok8211-72-37 07:21:00* 



             Test Item    Value        Reference Range Interpretation Comments

 

             Chloride Level (test code = 2075-0) 106                      

         





[facility] RenaissanceCarbon Dioxide Dlhav5633-26-75 07:21:00* 



             Test Item    Value        Reference Range Interpretation Comments

 

             Carbon Dioxide Level (test code = 2028-9) 25           22-29       

               





[facility] RenaissanceAnion Dwa4387-78-18 07:21:00* 



             Test Item    Value        Reference Range Interpretation Comments

 

             Anion Gap (test code = 33037-3) 10.8         8-16                  

     





[facility] RenaissanceBlood Urea Pheslkjy9669-14-88 07:21:00* 



             Test Item    Value        Reference Range Interpretation Comments

 

             Blood Urea Nitrogen (test code = 3094-0) 14           7-26         

              





[facility] RenaissanceCreatinine2019-12-18 07:21:00* 



             Test Item    Value        Reference Range Interpretation Comments

 

             Creatinine (test code = 2160-0) 0.56         0.57-1.11    L        

     





[facility] RenaissanceBUN/Creatinine Cotvo6157-78-80 07:21:00* 



             Test Item    Value        Reference Range Interpretation Comments

 

             BUN/Creatinine Ratio (test code = 3097-3) 25           6-25        

               





[facility] RenaissanceEstimat Glomerular Filtration Rate
2019 07:21:00* 



             Test Item    Value        Reference Range Interpretation Comments

 

             Estimat Glomerular Filtration Rate (test code = 802432075) > 60    

     >60                        





Ranges were taken from the National Kidney Disease Education Program and the [facility] [employer] literature.Reference ranges:60 or greater: Wqtqkh57-62 (
for 3 consecutive months): Chronic kidney disease 15 or less: Kidney failureDoctors Hospital at RenaissanceGlucose Duxei2749-93-37 07:21:00* 



             Test Item    Value        Reference Range Interpretation Comments

 

             Glucose Level (test code = QNM1213) 91                       

         





[facility] RenaissanceCalcium Rwiuh3637-37-54 07:21:00* 



             Test Item    Value        Reference Range Interpretation Comments

 

             Calcium Level (test code = 98404-3) 9.0          8.4-10.2          

         





[facility] RenaissancePhosphorus Xdecq5677-00-72 07:21:00* 



             Test Item    Value        Reference Range Interpretation Comments

 

             Phosphorus Level (test code = PEL5947) 3.0          2.3-4.7        

            





[facility] RenaissanceMagnesium Csjcu3171-99-28 07:21:00* 



             Test Item    Value        Reference Range Interpretation Comments

 

             Magnesium Level (test code = 18471-8) 1.6          1.3-2.1         

           





[facility] RenaissanceTotal Pdxktfiyh3172-56-76 07:21:00* 



             Test Item    Value        Reference Range Interpretation Comments

 

             Total Bilirubin (test code = 1975-2) 0.4          0.2-1.2          

          





[facility] RenaissanceAspartate Amino Transf (AST/SGOT)
2019 07:21:00* 



             Test Item    Value        Reference Range Interpretation Comments

 

                                        Aspartate Amino Transf (AST/SGOT) (test 

code = Aspartate Amino Transf 

(AST/SGOT))     21              5-34                             





[facility] RenaissanceAlanine Aminotransferase (ALT/SGPT)
2019 07:21:00* 



             Test Item    Value        Reference Range Interpretation Comments

 

             Alanine Aminotransferase (ALT/SGPT) (test code = 1742-6) 23        

   0-55                       





[facility] RenaissanceTotal Isvaebk6254-15-15 07:21:00* 



             Test Item    Value        Reference Range Interpretation Comments

 

             Total Protein (test code = 2885-2) 5.9          6.5-8.1      L     

        





[facility] RenaissanceAlbumin2019-12-18 07:21:00* 



             Test Item    Value        Reference Range Interpretation Comments

 

             Albumin (test code = 1751-7) 3.3          3.5-5.0      L           

  





[facility] RenaissanceGlobulin2019-12-18 07:21:00* 



             Test Item    Value        Reference Range Interpretation Comments

 

             Globulin (test code = 13721-9) 2.6          2.3-3.5                

    





[facility] RenaissanceAlbumin/Globulin Aoxvb4604-06-04 07:21:00
  * 



             Test Item    Value        Reference Range Interpretation Comments

 

             Albumin/Globulin Ratio (test code = 1759-0) 1.3          0.8-2.0   

                 





[facility] RenaissanceAlkaline Afgcpqxuqrh2643-00-86 07:21:00* 



             Test Item    Value        Reference Range Interpretation Comments

 

             Alkaline Phosphatase (test code = 6768-6) 67                 

               





[facility] RenaissanceTriglycerides Rkhpi0561-95-12 07:21:00* 



             Test Item    Value        Reference Range Interpretation Comments

 

             Triglycerides Level (test code = 2571-8) 95           0-149        

              





[facility] RenaissanceCholesterol Nepmd6178-93-04 07:21:00* 



             Test Item    Value        Reference Range Interpretation Comments

 

             Cholesterol Level (test code = 2093-3) 182          0-199          

            





Less than 200 mg/dL       Low Ziyl593 - 239 mg/dL           Borderline Yswx517 m
g/dl and greater     High Risk                        [facility] RenaissanceLDL Wtdddontccz7986-26-48 07:21:00* 



             Test Item    Value        Reference Range Interpretation Comments

 

             LDL Cholesterol (test code = 2089-1) 115                     

          





[facility] RenaissanceHDL Qwmvddvphpf2335-71-42 07:21:00* 



             Test Item    Value        Reference Range Interpretation Comments

 

             HDL Cholesterol (test code = 2085-9) 48           40-60            

          





[facility] RenaissanceCholesterol/HDL Ullbk4546-30-40 07:21:00
  * 



             Test Item    Value        Reference Range Interpretation Comments

 

             Cholesterol/HDL Ratio (test code = 9830-1) 3.8          3.0-3.6    

  H             





[facility] RenaissanceCreatine Xulbhl3628-98-14 07:03:00* 



             Test Item    Value        Reference Range Interpretation Comments

 

             Creatine Kinase (test code = 2157-6) 123                     

          





[facility] RenaissanceWhite Blood Ysaht4177-60-15 06:30:00* 



             Test Item    Value        Reference Range Interpretation Comments

 

             White Blood Count (test code = 6690-2) 5.94         4.8-10.8       

            





[facility] RenaissanceRed Blood Zjiur7719-92-78 06:30:00* 



             Test Item    Value        Reference Range Interpretation Comments

 

             Red Blood Count (test code = 789-8) 3.83         3.6-5.1           

         





[facility] RenaissanceHemoglobin2019-12-18 06:30:00* 



             Test Item    Value        Reference Range Interpretation Comments

 

             Hemoglobin (test code = 38665-6) 12.2         12.0-16.0            

      





[facility] RenaissanceHematocrit2019-12-18 06:30:00* 



             Test Item    Value        Reference Range Interpretation Comments

 

             Hematocrit (test code = 4544-3) 36.8         34.2-44.1             

     





[facility] RenaissanceMean Corpuscular Sjzcdb9656-12-73 
06:30:00* 



             Test Item    Value        Reference Range Interpretation Comments

 

             Mean Corpuscular Volume (test code = 787-2) 96.1         81-99     

                 





[facility] RenaissanceMean Corpuscular Fqqrkmxdkp0772-98-23 
06:30:00* 



             Test Item    Value        Reference Range Interpretation Comments

 

             Mean Corpuscular Hemoglobin (test code = 785-6) 31.9         28-32 

                     





[facility] RenaissanceMean Corpuscular Hemoglobin Concent
2019 06:30:00* 



             Test Item    Value        Reference Range Interpretation Comments

 

             Mean Corpuscular Hemoglobin Concent (test code = 786-4) 33.2       

  31-35                      





[facility] RenaissanceRed Cell Distribution Xcmdz4213-62-52 
06:30:00* 



             Test Item    Value        Reference Range Interpretation Comments

 

             Red Cell Distribution Width (test code = 79115-7) 13.8         11.7

-14.4                  





[facility] RenaissancePlatelet Ivvne2932-04-44 06:30:00* 



             Test Item    Value        Reference Range Interpretation Comments

 

             Platelet Count (test code = 777-3) 251          140-360            

        





[facility] RenaissanceNeutrophils (%) (Auto)2019 06:30:00
  * 



             Test Item    Value        Reference Range Interpretation Comments

 

             Neutrophils (%) (Auto) (test code = 97976-4) 46.1         38.7-80.0

                  





[facility] RenaissanceLymphocytes (%) (Auto)2019 06:30:00
  * 



             Test Item    Value        Reference Range Interpretation Comments

 

             Lymphocytes (%) (Auto) (test code = 736-9) 40.4         18.0-39.1  

  H             





[facility] RenaissanceMonocytes (%) (Auto)2019 06:30:00* 



             Test Item    Value        Reference Range Interpretation Comments

 

             Monocytes (%) (Auto) (test code = 5905-5) 8.6          4.4-11.3    

               





[facility] RenaissanceEosinophils (%) (Auto)2019 06:30:00
  * 



             Test Item    Value        Reference Range Interpretation Comments

 

             Eosinophils (%) (Auto) (test code = 713-8) 3.5          0.0-6.0    

                





[facility] RenaissanceBasophils (%) (Auto)2019 06:30:00* 



             Test Item    Value        Reference Range Interpretation Comments

 

             Basophils (%) (Auto) (test code = 706-2) 1.2          0.0-1.0      

H             





[facility] RenaissanceIM GRANULOCYTES %2019 06:30:00* 



             Test Item    Value        Reference Range Interpretation Comments

 

             IM GRANULOCYTES % (test code = IM GRANULOCYTES %) 0.2          0.0-

1.0                    





[facility] RenaissanceNeutrophils # (Auto)2019 06:30:00* 



             Test Item    Value        Reference Range Interpretation Comments

 

             Neutrophils # (Auto) (test code = 751-8) 2.7          2.1-6.9      

              





[facility] RenaissanceLymphocytes # (Auto)2019 06:30:00* 



             Test Item    Value        Reference Range Interpretation Comments

 

             Lymphocytes # (Auto) (test code = 29891-0) 2.4          1.0-3.2    

                





[facility] RenaissanceMonocytes # (Auto)2019 06:30:00* 



             Test Item    Value        Reference Range Interpretation Comments

 

             Monocytes # (Auto) (test code = 742-7) 0.5          0.2-0.8        

            





[facility] RenaissanceEosinophils # (Auto)2019 06:30:00* 



             Test Item    Value        Reference Range Interpretation Comments

 

             Eosinophils # (Auto) (test code = 711-2) 0.2          0.0-0.4      

              





[facility] RenaissanceBasophils # (Auto)2019 06:30:00* 



             Test Item    Value        Reference Range Interpretation Comments

 

             Basophils # (Auto) (test code = 704-7) 0.1          0.0-0.1        

            





[facility] RenaissanceAbsolute Immature Granulocyte (auto
2019 06:30:00* 



             Test Item    Value        Reference Range Interpretation Comments

 

                                        Absolute Immature Granulocyte (auto (olvin

t code = Absolute Immature Granulocyte 

(auto)          0.01            0-0.1                            





[facility] RenaissanceBlood leukocytes automated count 
(number/volume)2019 03:50:00* 



             Test Item    Value        Reference Range Interpretation Comments

 

             White Blood Count (test code = 6690-2) 5.94         4.8-10.8       

            





[facility] RenaissanceBlood erythrocytes automated count 
(number/volume)2019 03:50:00* 



             Test Item    Value        Reference Range Interpretation Comments

 

             Red Blood Count (test code = 789-8) 3.83         3.6-5.1           

         





[facility] RenaissanceBlood hemoglobin measurement 
(moles/volume)2019 03:50:00* 



             Test Item    Value        Reference Range Interpretation Comments

 

             Hemoglobin (test code = 14097-7) 12.2         12.0-16.0            

      





[facility] RenaissanceAutomated blood hematocrit (volume 
fraction)2019 03:50:00* 



             Test Item    Value        Reference Range Interpretation Comments

 

             Hematocrit (test code = 4544-3) 36.8         34.2-44.1             

     





[facility] RenaissanceAutomated erythrocyte mean corpuscular 
mbazzx2956-84-87 03:50:00* 



             Test Item    Value        Reference Range Interpretation Comments

 

             Mean Corpuscular Volume (test code = 787-2) 96.1         81-99     

                 





[facility] RenaissanceAutomated erythrocyte mean corpuscular 
hemoglobin (mass per erythrocyte)2019 03:50:00* 



             Test Item    Value        Reference Range Interpretation Comments

 

             Mean Corpuscular Hemoglobin (test code = 785-6) 31.9         28-32 

                     





[facility] RenaissanceAutomated erythrocyte mean corpuscular 
hemoglobin concentration measurement (mass/volume)2019 03:50:00* 



             Test Item    Value        Reference Range Interpretation Comments

 

             Mean Corpuscular Hemoglobin Concent (test code = 786-4) 33.2       

  31-35                      





[facility] RenaissanceRDW OtjOq-Cak8803-50-18 03:50:00* 



             Test Item    Value        Reference Range Interpretation Comments

 

             Red Cell Distribution Width (test code = 22181-7) 13.8         11.7

-14.4                  





[facility] RenaissanceAutomated blood platelet count 
(count/volume)2019 03:50:00* 



             Test Item    Value        Reference Range Interpretation Comments

 

             Platelet Count (test code = 777-3) 251          140-360            

        





[facility] RenaissanceAutomated blood segmented neutrophil 
count as percentage of total tuaehaegul2478-00-68 03:50:00* 



             Test Item    Value        Reference Range Interpretation Comments

 

             Neutrophils (%) (Auto) (test code = 72301-4) 46.1         38.7-80.0

                  





[facility] RenaissanceAutM[facility] McDowelled blood lymphocyte count as 
percentage ot total etqoxqqyzk1878-66-73 03:50:00* 



             Test Item    Value        Reference Range Interpretation Comments

 

             Lymphocytes (%) (Auto) (test code = 736-9) 40.4         18.0-39.1  

                





[facility] RenaissanceAutomated blood monocyte count as 
percentage of total bjvatofbpw5253-50-81 03:50:00* 



             Test Item    Value        Reference Range Interpretation Comments

 

             Monocytes (%) (Auto) (test code = 5905-5) 8.6          4.4-11.3    

               





[facility] RenaissanceAutomated blood eosinophil count as 
percentage of total caxiyiflip4023-50-88 03:50:00* 



             Test Item    Value        Reference Range Interpretation Comments

 

             Eosinophils (%) (Auto) (test code = 713-8) 3.5          0.0-6.0    

                





[facility] RenaissanceAutomated blood basophil count as 
percentage of total kckpgmrnni2479-32-99 03:50:00* 



             Test Item    Value        Reference Range Interpretation Comments

 

             Basophils (%) (Auto) (test code = 706-2) 1.2          0.0-1.0      

              





[facility] RenaissanceFluoroscopic procedure less than one hour
xwjyvtod5332-40-68 03:50:00* 



             Test Item    Value        Reference Range Interpretation Comments

 

             IM GRANULOCYTES % (test code = IM GRANULOCYTES %) 0.2          0.0-

1.0                    





[facility] RenaissanceAutomated blood neutrophil count
2019 03:50:00* 



             Test Item    Value        Reference Range Interpretation Comments

 

             Neutrophils # (Auto) (test code = 751-8) 2.7          2.1-6.9      

              





[facility] RenaissanceBlood lymphocytes count (number/volume)
2019 03:50:00* 



             Test Item    Value        Reference Range Interpretation Comments

 

             Lymphocytes # (Auto) (test code = 99392-4) 2.4          1.0-3.2    

                





[facility] RenaissanceBlood monocytes automated count 
(number/volume)2019 03:50:00* 



             Test Item    Value        Reference Range Interpretation Comments

 

             Monocytes # (Auto) (test code = 742-7) 0.5          0.2-0.8        

            





[facility] RenaissanceAutomated blood eosinophil count
2019 03:50:00* 



             Test Item    Value        Reference Range Interpretation Comments

 

             Eosinophils # (Auto) (test code = 711-2) 0.2          0.0-0.4      

              





[facility] RenaissanceAutomated blood basophil count 
(count/volume)2019 03:50:00* 



             Test Item    Value        Reference Range Interpretation Comments

 

             Basophils # (Auto) (test code = 704-7) 0.1          0.0-0.1        

            





[facility] RenaissanceFluoroscopic procedure less than one hour
bnmmlrys4069-80-85 03:50:00* 



             Test Item    Value        Reference Range Interpretation Comments

 

                                        Absolute Immature Granulocyte (auto (olvin

t code = Absolute Immature Granulocyte 

(auto)          0.01            0-0.1                            





[facility] sodium [facility] 
(moles/volume)2019 03:50:00* 



             Test Item    Value        Reference Range Interpretation Comments

 

             Sodium Level (test code = 2951-2) 138          136-145             

       





[facility]herum or plasma potassium measurement 
(moles/volume)2019 03:50:00* 



             Test Item    Value        Reference Range Interpretation Comments

 

             Potassium Level (test code = 2823-3) 3.8          3.5-5.1          

          





[facility]her[facility] or plasma chloride measurement 
(moles/volume)2019 03:50:00* 



             Test Item    Value        Reference Range Interpretation Comments

 

             Chloride Level (test code = 2075-0) 106                      

         





[facility]herum or plasma carbon dioxide, total 
measurement (moles/volume)2019 03:50:00* 



             Test Item    Value        Reference Range Interpretation Comments

 

             Carbon Dioxide Level (test code = 2028-9) 25           22-29       

               





[facility]herum or plasma anion pqi1581-03-04 
03:50:00* 



             Test Item    Value        Reference Range Interpretation Comments

 

             Anion Gap (test code = 33037-3) 10.8         8-16                  

     





[facility]herum or plasma urea nitrogen measurement
(mass/volume)2019 03:50:00* 



             Test Item    Value        Reference Range Interpretation Comments

 

             Blood Urea Nitrogen (test code = 3094-0) 14           7-26         

              





[facility]herum or plasma creatinine measurement 
(mass/volume)2019 03:50:00* 



             Test Item    Value        Reference Range Interpretation Comments

 

             Creatinine (test code = 2160-0) 0.56         0.57-1.11             

     





[facility]herum or plasma urea nitrogen/creatinine 
mass rggrq4101-49-29 03:50:00* 



             Test Item    Value        Reference Range Interpretation Comments

 

             BUN/Creatinine Ratio (test code = 3097-3) 25           6-25        

               





[facility] RenaissanceEstimated glomerular filtration rate 
(GFR) gipbqtsmxaulf8615-38-31 03:50:00* 



             Test Item    Value        Reference Range Interpretation Comments

 

             Estimat Glomerular Filtration Rate (test code = 133623505) > 60    

     >60                        





Ranges were taken from the National Kidney Disease Education Program and the [facility] [employer] literature.Reference ranges:60 or greater: Esxqyg36-28 (
for 3 consecutive months): Chronic kidney disease 15 or less: Kidney failureDoctors Hospital at RenaissanceGlucose hkyqyiwvpua6567-59-70 03:50:00* 



             Test Item    Value        Reference Range Interpretation Comments

 

             Glucose Level (test code = XPK2710) 91                       

         





[facility]herum or plasma calcium measurement 
(mass/volume)2019 03:50:00* 



             Test Item    Value        Reference Range Interpretation Comments

 

             Calcium Level (test code = 37385-2) 9.0          8.4-10.2          

         





[facility] RenaissancePhosphorus ylomikgutdn8199-24-72 03:50:00
  * 



             Test Item    Value        Reference Range Interpretation Comments

 

             Phosphorus Level (test code = ZGO1992) 3.0          2.3-4.7        

            





[facility]herum or plasma magnesium measurement 
(mass/volume)2019 03:50:00* 



             Test Item    Value        Reference Range Interpretation Comments

 

             Magnesium Level (test code = 38460-0) 1.6          1.3-2.1         

           





[facility]herum or plasma total bilirubin 
measurement (mass/volume)2019 03:50:00* 



             Test Item    Value        Reference Range Interpretation Comments

 

             Total Bilirubin (test code = 1975-2) 0.4          0.2-1.2          

          





[facility] RenaissanceFluoroscopic procedure less than one hour
cvkowfjw8308-67-31 03:50:00* 



             Test Item    Value        Reference Range Interpretation Comments

 

                                        Aspartate Amino Transf (AST/SGOT) (test 

code = Aspartate Amino Transf 

(AST/SGOT))     21              5-34                             





[facility]herum or plasma alanine aminotransferase 
measurement (enzymatic activity/volume)2019 03:50:00* 



             Test Item    Value        Reference Range Interpretation Comments

 

             Alanine Aminotransferase (ALT/SGPT) (test code = 1742-6) 23        

   0-55                       





[facility]herum or plasma protein measurement 
(mass/volume)2019 03:50:00* 



             Test Item    Value        Reference Range Interpretation Comments

 

             Total Protein (test code = 2885-2) 5.9          6.5-8.1            

        





[facility]herum or plasma albumin measurement 
(mass/volume)2019 03:50:00* 



             Test Item    Value        Reference Range Interpretation Comments

 

             Albumin (test code = 1751-7) 3.3          3.5-5.0                  

  





[facility] RenaissancePlasma globulin measurement (mass/volume)
2019 03:50:00* 



             Test Item    Value        Reference Range Interpretation Comments

 

             Globulin (test code = 87347-9) 2.6          2.3-3.5                

    





[facility]herum or plasma albumin/globulin mass 
ganwp9563-31-89 03:50:00* 



             Test Item    Value        Reference Range Interpretation Comments

 

             Albumin/Globulin Ratio (test code = 1759-0) 1.3          0.8-2.0   

                 





[facility]herum or plasma alkaline phosphatase 
measurement (enzymatic activity/volume)2019 03:50:00* 



             Test Item    Value        Reference Range Interpretation Comments

 

             Alkaline Phosphatase (test code = 6768-6) 67                 

               





[facility]herum or plasma triglyceride measurement 
(mass/volume)2019 03:50:00* 



             Test Item    Value        Reference Range Interpretation Comments

 

             Triglycerides Level (test code = 2571-8) 95           0-149        

              





[facility]um or plasma cholesterol measurement 
(mass/volume)2019 03:50:00* 



             Test Item    Value        Reference Range Interpretation Comments

 

             Cholesterol Level (test code = 2093-3) 182          0-199          

            





Less than 200 mg/dL       Low Zkpq796 - 239 mg/dL           Borderline Mpau959 m
g/dl and greater     High Risk                        [facility]herum or plasma cholesterol in LDL measurement (mass/volume)
2019 03:50:00* 



             Test Item    Value        Reference Range Interpretation Comments

 

             LDL Cholesterol (test code = 2089-1) 115                     

          





[facility]herum or plasma cholesterol in HDL 
measurement (mass/volume)2019 03:50:00* 



             Test Item    Value        Reference Range Interpretation Comments

 

             HDL Cholesterol (test code = 2085-9) 48           40-60            

          





[facility]herum or plasma total 
cholesterol/cholesterol in HDL mass ghozq4535-01-82 03:50:00* 



             Test Item    Value        Reference Range Interpretation Comments

 

             Cholesterol/HDL Ratio (test code = 9830-1) 3.8          3.0-3.6    

                





[facility]herum or plasma creatine kinase 
measurement (enzymatic activity/volume)2019 03:50:00* 



             Test Item    Value        Reference Range Interpretation Comments

 

             Creatine Kinase (test code = 2157-6) 123                     

          





[facility]herum or plasma creatine kinase MB 
measurement (mass/volume)2019 03:50:00* 



             Test Item    Value        Reference Range Interpretation Comments

 

             Creatine Kinase MB (test code = 91423-6) 2.60         0-5.0        

              





[facility] RenaissanceTroponin I measurement by highly 
sensitive enzyme hjnnivoixvw6541-31-43 03:50:00* 



             Test Item    Value        Reference Range Interpretation Comments

 

             Troponin I (test code = 21324-2) 0.056        0-0.300              

      





[facility] RenaissanceB-Type Natriuretic Uajhcsz0103-86-05 
18:29:00* 



             Test Item    Value        Reference Range Interpretation Comments

 

             B-Type Natriuretic Peptide (test code = 65768-1) 28.5         0-100

                      





[facility] RenaissanceLipase2019-12-17 18:29:00* 



             Test Item    Value        Reference Range Interpretation Comments

 

             Lipase (test code = 3040-3) 46           8-78                      

 





[facility] RenaissanceProthrombin Onqe8319-90-80 18:03:00* 



             Test Item    Value        Reference Range Interpretation Comments

 

             Prothrombin Time (test code = 5902-2) 12.9         11.9-14.5       

           





[facility] RenaissanceProthromb Time International Ratio
2019 18:03:00* 



             Test Item    Value        Reference Range Interpretation Comments

 

             Prothromb Time International Ratio (test code = 6301-6) 0.92       

                             





Oral Anticoagulant Therapy INR Values:1. Low Intensity Therapy        1.5 - 2.02
. Moderate Intensity Therapy   2.0 - 3.03. High Intensity Therapy(1)    2.5 - 3.
54. High Intensity Therapy(2)    3.0 - 4.05. Panic Value INR              > 5.0
[facility] RenaissanceActivated Partial Thromboplast Time
2019 18:03:00* 



             Test Item    Value        Reference Range Interpretation Comments

 

             Activated Partial Thromboplast Time (test code = 80095-5) 29.2     

    23.8-35.5                  





[facility] RenaissanceUrine [id number] 14:57:00* 



             Test Item    Value        Reference Range Interpretation Comments

 

             Urine WBC (test code = 5821-4) 6-10         0-5          H         

    





[facility] RenaissanceUrine IQE6959-09-62 14:57:00* 



             Test Item    Value        Reference Range Interpretation Comments

 

             Urine RBC (test code = 83506-4) 6-10         0-5          H        

     





[facility] RenaissanceUrine Wzuhckst6363-13-11 14:57:00* 



             Test Item    Value        Reference Range Interpretation Comments

 

             Urine Bacteria (test code = 94422-0) RARE         NONE             

          





[facility] Cwbas8131-05-58 14:57:00
  * 



             Test Item    Value        Reference Range Interpretation Comments

 

             Urine Epithelial Cells (test code = 35376-7) FEW          NONE     

                  





[facility] Zgigy4753-96-80 14:40:00* 



             Test Item    Value        Reference Range Interpretation Comments

 

             Urine Color (test code = 5778-6) YELLOW       YELLOW               

      





[facility] Uhfgtyg5887-62-58 14:40:00* 



             Test Item    Value        Reference Range Interpretation Comments

 

             Urine Clarity (test code = 59378-5) SL CLOUDY    CLEAR             

         





[facility] Specific Hpjjeyj9427-93-56 14:40:00
  * 



             Test Item    Value        Reference Range Interpretation Comments

 

             Urine Specific Gravity (test code = 5811-5) 1.030        1.010-1.02

5  H             





[facility] sG9124-77-49 14:40:00* 



             Test Item    Value        Reference Range Interpretation Comments

 

             Urine pH (test code = 88257-3) 6            5-7                    

    





[facility] Leukocyte Xaaeabdk9951-85-21 
14:40:00* 



             Test Item    Value        Reference Range Interpretation Comments

 

             Urine Leukocyte Esterase (test code = 5799-2) NEGATIVE     NEGATIVE

                   





[facility] Cdrodek2445-53-12 14:40:00* 



             Test Item    Value        Reference Range Interpretation Comments

 

             Urine Nitrite (test code = 89429-6) NEGATIVE     NEGATIVE          

         





[facility] Imqeobq5959-04-18 14:40:00* 



             Test Item    Value        Reference Range Interpretation Comments

 

             Urine Protein (test code = 5804-0) 2+           NEGATIVE     H     

        





[facility] Glucose [facility]UA)2019 14:40:00* 



             Test Item    Value        Reference Range Interpretation Comments

 

             Urine Glucose (UA) (test code = 2349-9) NEGATIVE     NEGATIVE      

             





[facility] Iavuoiw7581-35-33 14:40:00* 



             Test Item    Value        Reference Range Interpretation Comments

 

             Urine Ketones (test code = 23329-2) NEGATIVE     NEGATIVE          

         





[facility] Dvvncagvilty3732-45-74 14:40:00* 



             Test Item    Value        Reference Range Interpretation Comments

 

             Urine Urobilinogen (test code = 22697-5) 0.2          0.2-1        

              





[facility] Omfbsyysh0761-06-05 14:40:00* 



             Test Item    Value        Reference Range Interpretation Comments

 

             Urine Bilirubin (test code = 1978-6) NEGATIVE     NEGATIVE         

          





[facility] Vpxsj7382-54-58 14:40:00* 



             Test Item    Value        Reference Range Interpretation Comments

 

             Urine Blood (test code = 63666-2) 1+           NEGATIVE     H      

       





[facility] 
14:40:00                                                                        
             [facility]                        [address] [place] [address]      Patient Name: 
JULIO YOUNG                                   MR #: [id number]              
      : 1955                                   Age/Sex: 64/F  Acct #: 
[id number]                              Req #: [id number]  Adm Physician:     
                                                Ordered by: TYREE PRATT, MYRA PRATT                            Report #: [id number]        Location: ER           
                          Room/Bed:                     
___________________________________________
________________________________________________________    Procedure: [id number]
DX/CHEST SINGLE (NOT PORTABLE)  Exam Date: 19                            
Exam Time: 1416                                              REPORT STATUS: Sign
ed    EXAMINATION:  CHEST SINGLE (NOT PORTABLE)          INDICATION: Chest pain.
     COMPARISON:  None           FINDINGS:   TUBES and LINES:  None.      LUNGS:
 Lungs are well inflated.  Lungs are clear.   There is no evidence of   pneumo
juan or pulmonary edema.      PLEURA:  No pleural effusion or pneumothorax.      
HEART AND MEDIASTINUM:  The cardiomediastinal silhouette is unremarkable.       
  BONES AND SOFT TISSUES:  No acute osseous lesion.  Status post right mastecto
my   and axillary lymph node dissection. Degenerative changes of the SI joint   
bilaterally.      UPPER ABDOMEN: No free air under the diaphragm.          IMPRE
SSION:    No acute thoracic abnormality.         Signed by: Dr. DASH alegria M.D. on 2019 2:41 PM        Dictated By: ALCIDES GODWIN MD, MD  Elect
ronically Signed By: ALCIDES GODWIN MD, MD on 19 1441  Transcribed By: NAJMA SCHAEFFER on 19 1441       COPY TO:   MYRA GRACE         Urine color 
bfhvqfctwdsbb0255-57-50 12:33:00* 



             Test Item    Value        Reference Range Interpretation Comments

 

             Urine Color (test code = 5778-6) YELLOW       YELLOW               

      





[facility] ycgyzaa0856-65-41 12:33:00* 



             Test Item    Value        Reference Range Interpretation Comments

 

             Urine Clarity (test code = 84378-0) SL CLOUDY    CLEAR             

         





[facility]
2019 12:33:00* 



             Test Item    Value        Reference Range Interpretation Comments

 

             Urine Specific Gravity (test code = 5811-5) 1.030        1.010-1.02

5                





[facility] pH measurement by automated test 
amcey8180-84-13 12:33:00* 



             Test Item    Value        Reference Range Interpretation Comments

 

             Urine pH (test code = 11390-0) 6            5-7                    

    





[facility] leukocyte esterase detection by 
gxlygboy6588-01-83 12:33:00* 



             Test Item    Value        Reference Range Interpretation Comments

 

             Urine Leukocyte Esterase (test code = 5799-2) NEGATIVE     NEGATIVE

                   





[facility] nitrite zestjbjio2316-84-78 
12:33:00* 



             Test Item    Value        Reference Range Interpretation Comments

 

             Urine Nitrite (test code = 66355-9) NEGATIVE     NEGATIVE          

         





[facility] protein measurement by test strip 
(mass/volume)2019 12:33:00* 



             Test Item    Value        Reference Range Interpretation Comments

 

             Urine Protein (test code = 5804-0) 2+           NEGATIVE           

        





[facility] glucose ieuwgdfqx3119-92-91 
12:33:00* 



             Test Item    Value        Reference Range Interpretation Comments

 

             Urine Glucose (UA) (test code = 2349-9) NEGATIVE     NEGATIVE      

             





[facility] ketones detection by automated test
crbqi8634-92-41 12:33:00* 



             Test Item    Value        Reference Range Interpretation Comments

 

             Urine Ketones (test code = 14619-4) NEGATIVE     NEGATIVE          

         





[facility] urobilinogen measurement by test 
strip (mass/volume)2019 12:33:00* 



             Test Item    Value        Reference Range Interpretation Comments

 

             Urine Urobilinogen (test code = 98401-8) 0.2          0.2-1        

              





[facility] total bilirubin measurement 
(mass/volume)2019 12:33:00* 



             Test Item    Value        Reference Range Interpretation Comments

 

             Urine Bilirubin (test code = 1978-6) NEGATIVE     NEGATIVE         

          





[facility] erythrocytes qozhlxzja1389-44-38 
12:33:00* 



             Test Item    Value        Reference Range Interpretation Comments

 

             Urine Blood (test code = 23411-2) 1+           NEGATIVE            

       





[facility] RenaissanceAutomated urine sediment leukocyte count 
by microscopy (number/high power field)2019 12:33:00* 



             Test Item    Value        Reference Range Interpretation Comments

 

             Urine WBC (test code = 5821-4) 6-10         0-5                    

    





[facility] RenaissanceErythrocytes detection in urine sediment 
by light sqpdzsmsen3301-65-26 12:33:00* 



             Test Item    Value        Reference Range Interpretation Comments

 

             Urine RBC (test code = 45545-5) 6-10         0-5                   

     





[facility] RenaissanceB[facility] detection in urine sediment by 
light vzrdqelcom2712-56-40 12:33:00* 



             Test Item    Value        Reference Range Interpretation Comments

 

             Urine Bacteria (test code = 80191-4) RARE         NONE             

          





[facility] Renaissance[facility]pithelial cells detection in urine 
sediment by light hzossqsnbl1820-85-03 12:33:00* 



             Test Item    Value        Reference Range Interpretation Comments

 

             Urine Epithelial Cells (test code = 11396-9) FEW          NONE     

                  





[facility] RenaissanceProthrombin time (PT) in platelet poor 
plasma by coagulation cezsj9133-02-56 12:31:00* 



             Test Item    Value        Reference Range Interpretation Comments

 

             Prothrombin Time (test code = 5902-2) 12.9         11.9-14.5       

           





[facility] Renaissance[facility] in Platelet poor plasma by 
Coagulation svclm4189-07-89 12:31:00* 



             Test Item    Value        Reference Range Interpretation Comments

 

             Prothromb Time International Ratio (test code = 6301-6) 0.92       

                             





Oral Anticoagulant Therapy INR Values:1. Low Intensity Therapy        1.5 - 2.02
. Moderate Intensity Therapy   2.0 - 3.03. High Intensity Therapy(1)    2.5 - 3.
54. High Intensity Therapy(2)    3.0 - 4.05. Panic Value INR              > 5.0
[facility] RenaissanceActivated partial thromboplastin time 
(aPTT) in platelet poor plasma by coagulation cxrds9367-50-51 12:31:00* 



             Test Item    Value        Reference Range Interpretation Comments

 

             Activated Partial Thromboplast Time (test code = 08559-3) 29.2     

    23.8-35.5                  





[facility] RenaissanceBNP Bld-mQxw6932-16-92 12:31:00* 



             Test Item    Value        Reference Range Interpretation Comments

 

             B-Type Natriuretic Peptide (test code = 57697-2) 28.5         0-100

                      





[facility] Wortherum or plasma lipase measurement 
(enzymatic activity/volume)2019 12:31:00* 



             Test Item    Value        Reference Range Interpretation Comments

 

             Lipase (test code = 3040-3) 46           8-78                      

 





[facility] RenaissanceCHEM WUAZX0446-36-55 19:39:000.3[facility] 
HermannCHEM RFLFS2425-61-53 19:39:21461Duoyzbkj HermannURINE AND FXKPZ9513-84-47
01:35:00Yellow *NA*(19 7:35 PM)[facility] AND VCMXL9948-11-96 
01:35:00Clear (19 7:35 PM)[facility] CKVLY2043-87-41 01:35:00
Negative *NA*(19 7:35 PM)[facility] AND WWTJQ3153-46-76 01:35:00
  * 



             Test Item    Value        Reference Range Interpretation Comments

 

             UA pH (test code = UA pH) 6.0 1        5.0-8.0                    





[facility]annUR[facility] AND XRGDT2610-64-53 01:35:00* 



             Test Item    Value        Reference Range Interpretation Comments

 

             UA Spec Grav (test code = UA Spec Grav) 1.018 1                    

             





[facility]annUR[facility] AND JKZCO3035-87-85 01:35:00Negative *NA*(19 7:35 PM)
[facility]ann[facility] AND MJQNX4332-47-38 01:35:00Negative *NA*(19 7:35 PM)
[facility]ann[facility] AND CSXIX4832-22-32 01:35:00Small *ABN*(19 7:35 PM)
[facility]ann[facility] AND MWMQB8360-19-93 01:35:00Positive *ABN*(19 7:35 PM)
[facility]ann[facility] AND QSCSB6454-83-88 01:35:005[facility]annUR[facility] AND 
KSCDN1087-69-88 01:35:00Trace *ABN*(19 7:35 PM)[facility]ann[facility] AND 
SHXPY6663-15-91 01:35:006[facility] HermannCHEM MCWET4854-63-04 01:14:64900
[facility] MvoritbCXDTPNKXBVDZ8560-76-46 01:14:0011.7[facility] HermannELECTROLYTES
2019 01:14:00* 



             Test Item    Value        Reference Range Interpretation Comments

 

             B/C Ratio (test code = B/C Ratio) 29 1         6-25                

       





[facility] OuritviGMZFZLBHDAYC4273-93-07 01:14:004.1[facility] HermannELECTROLYTES
2019 01:14:00* 



             Test Item    Value        Reference Range Interpretation Comments

 

             A/G Ratio (test code = A/G Ratio) 0.9 1        0.7-1.6             

       





[facility] BrvanomUZEJARYVPYIU3775-17-44 01:14:14027Bbaqzqhj HermannELECTROLYTES
2019 01:14:0094[facility] QfvzwoqSPWGQOVEJPAC7560-26-21 01:14:000.3[facility] 
OfzvhasFVISSTHVVGHQ8378-92-21 01:14:04445Uwhhkxym DauokzcYFPMYJWZAWCX0627-55-43 
01:14:003.7[facility] WmfuzxoFVVPIEERNIQZ7643-19-24 01:14:95341Xdovrefk Amol
CBFYXEUIBEXQ3424-63-96 01:14:009.2[facility] UmvnghpKFBGBXQGBGID2960-41-29 
01:14:0024[facility] ZvjyqtaSOLGCRULDZFJ3927-30-44 01:14:0017[facility] [place]
IJROLFMWUEXE6094-32-23 01:14:003.7[facility] DhxhadpMWRVTGGCUKMX9600-64-62 
01:14:007.8[facility] TilaoquUYRSGPVBBOLF3484-75-46 01:14:0015[facility] [place]
EDGIWMNPYRJH8743-81-85 01:14:57246Iroxpdkb YkicdxmUOJLBUXOHEGI4378-34-61 
01:14:0016[facility] BfkqhupFMTBOODMBROK4076-45-54 01:14:000.55[facility]
ETEGKOJYBS5777-34-21 01:14:008.2[facility] KwoocqrOKUCOWHTOD4450-47-48 01:14:00
37.5[facility] YdlmgyiLCHSXWFPCH2641-48-94 01:14:0094.3[facility] HermannHEMATOLOGY
2019 01:14:0034.0[facility] XijwgwiQTPPJSFZNZ8983-51-18 01:14:00* 



             Test Item    Value        Reference Range Interpretation Comments

 

             MCH (test code = MCH) 32.1 pg      27.0-31.0                  





[facility] RcdhzfrOYBLHZETLK7758-65-71 01:14:89062Zwskznlj HermannHEMATOLOGY
2019 01:14:0013.8[facility] ZlajifqNLMGAPYNLE2578-07-63 01:14:0010.7[facility]
KcelnwuVRIWIVRDUS6141-43-74 01:14:003.97[facility] UgdwitySPPENHEMSQ5450-77-09 
01:14:0012.7[facility] DemdsunNPZJEHFCGM3358-35-53 01:14:000.8[facility]ol
MYONAUERFB1196-97-66 01:14:000.1[facility] LjnfdglBFHYGWWMYB2812-10-15 01:14:000.1
[facility] GifnzaoFYGXSXLBGZ8232-27-12 01:14:001.3[facility] HermannHEMATOLOGY
2019 01:14:000.6[facility] MbpansaJYYXAOSNIN3390-76-35 01:14:007.4[facility] 
FxpibmcTPMYGLPJZS1740-67-25 01:14:002.3[facility] TktrgwbNXUIGTZHGS7991-22-00 
01:14:0069.3[facility] FtcztdtGCYKNTNRVZ9111-38-27 01:14:0021.3[facility] [place]
BYCAJQGBNP6818-07-55 01:14:007.5[facility] HermannANKLE 3 + VIEWS UBBKF4429-40-87 
12:10:00    [place] [address]      Patient Name: JULIO YOUNG   MR #: 
[id number]    : 1955 Age/Sex: 63/F  Acct #: [id number] Req #: [id number]
[id number]  Adm Physician:     Ordered by: UZMA LAMB NP  Report #: [id number]
[id number]   Location: ER  Room/Bed:     
_______________________________________________________________________
____________________________    Procedure: [id number] DX/ANKLE 3 + VIEWS RIGHT  E
xam Date: 18                            Exam Time: 1145       REPORT STATU
S: Signed       Exam:  Ankle and foot 3 views each      History:  Pain, fall    
 Comparison: None.      Findings: Cortical avulsion of the lateral distal fibul
a. The malleolus distal   tip avulsion.  Mature hindfoot fusion. Joint spaces pr
eserved. No abnormal soft   tissue calcification or soft tissue defect.        I
mpression:      Cortical avulsion of the lateral distal fibula may be reflective
of superior   peroneal retinaculum injury.      Medial malleolus distal tip avu
lsion      Signed by: Dr. Andrew Palisch, M.D. on 2018 12:13 PM        Dict
ated By: ANDREW R PALISCH MD  Electronically Signed By: ANDREW R PALISCH MD on 0
18  Transcribed By: ROMEO on 18       COPY TO:   SALAS LAMB NP        FOOT RIGHT RTMEVGSU7456-73-51 12:10:00    [place] [address]      
Patient Name: JULIO YOUNG   MR #: [id number]    : 1955 Age/Sex: 
63/F  Acct #: [id number] Req #: [id number]  Adm Physician:     Ordered by: 
UZMA LAMB NP  Report #: [id number]   Location: ER  Room/Bed:     
_______________________________________________________________________
____________________________    Procedure: [id number] DX/FOOT RIGHT COMPLETE  Exa
m Date: 18                            Exam Time: 1145       REPORT STATUS:
Signed       Exam:  Ankle and foot 3 views each      History:  Pain, fall      
Comparison: None.      Findings: Cortical avulsion of the lateral distal fibula.
The malleolus distal   tip avulsion.  Mature hindfoot fusion. Joint spaces pres
erved. No abnormal soft   tissue calcification or soft tissue defect.        Imp
ression:      Cortical avulsion of the lateral distal fibula may be reflective o
f superior   peroneal retinaculum injury.      Medial malleolus distal tip avuls
ion      Signed by: Dr. Andrew Palisch, M.D. on 2018 12:13 PM        Dictat
ed By: ANDREW R PALISCH MD  Electronically Signed By: ANDREW R PALISCH MD on 09/
22/18 1213  Transcribed By: ROMEO on 183       COPY TO:   GLORIA LAMB NP        KNEE RIGHT THREE CLPUH6098-89-39 12:08:00    [place] [id number] [place] [address]      
Patient Name: JULIO YOUNG   MR #: [id number]    : 1955 Age/Sex: 
63/F  Acct #: [id number] Req #: [id number]  Adm Physician:     Ordered by: 
UZMA LAMB NP  Report #: [id number]   Location: ER  Room/Bed:     
_______________________________________________________________________
____________________________    Procedure: [id number] DX/KNEE RIGHT THREE VIEWS  
Exam Date: 18                            Exam Time: 1145       REPORT STAT
US: Signed       Exam:  Right knee 3 views      History:  Pain, fall      Compar
laura: None.      Findings: No fracture or malalignment. Joint spaces preserved. 
     Impression:      No acute osseous abnormality         Signed by: Dr. Andrew
Palisch, M.D. on 2018 12:09 PM        Dictated By: ANDREW R PALISCH MD  E
lectronically Signed By: ANDREW R PALISCH MD on 18  Transcribed By: PRERNA DYE on 18 120       COPY TO:   UZMA LAMB NP        HIP RIGHT 2-
3 VW (+/- PELVIS)2018 12:07:00    [place] [address]      Patient Name: 
JULIO YOUNG   MR #: [id number]    : 1955 Age/Sex: 63/F  Acct #: 
[id number] Req #: [id number]  Adm Physician:     Ordered by: UZMA LAMB
NP  Report #: [id number]   Location: ER  Room/Bed:     
_______________________________________________________________________
____________________________    Procedure: [id number] DX/HIP RIGHT 2-3 VW (+/- PE
LVIS)  Exam Date:                             Exam Time:        REPORT STATUS: S
igned       Exam:  Right hip 2 views and AP pelvis      History:  Pain, fall    
 Comparison: None.      Findings: No fracture or malalignment. Joint spaces pre
served. No abnormal soft   tissue calcification or soft tissue defect.        Im
pression:      No acute osseous abnormality         Signed by: Dr. Mega martins M.D. on 2018 12:08 PM        Dictated By: ANDREW R PALISCH MD  [place] [facility] Signed By: ANDREW R PALISCH MD on 18 1208  Transcribed By: ROMEO byrne 18 1208       COPY TO:   UZMA LAMB NP        URINE AND STOOL
2018-09-10 21:54:00* 



             Test Item    Value        Reference Range Interpretation Comments

 

             UA Spec Grav (test code = UA Spec Grav) 1.019 1                    

             





[facility] STOOL2018-09-10 21:54:00* 



             Test Item    Value        Reference Range Interpretation Comments

 

             UA pH (test code = UA pH) 5.0 1        5.0-8.0                    





[facility]annUR[facility] AND STOOL2018-09-10 21:54:00Clear (9/10/18 4:54 PM)
[facility] AND STOOL2018-09-10 21:54:00Negative (9/10/18 4:54 PM)
[facility] STOOL2018-09-10 21:54:00Negative (9/10/18 4:54 PM)
[facility] STOOL2018-09-10 21:54:001[facility]annUR[facility] AND 
STOOL2018-09-10 21:54:006[facility] STOOL2018-09-10 21:54:002.0
[facility] STOOL2018-09-10 21:54:00Negative *NA*(9/10/18 4:54 PM)
[facility] STOOL2018-09-10 21:54:00Negative (9/10/18 4:54 PM)
[facility] HermannCHEM PANEL2018-09-10 21:24:07119Kdaqwcpw HermannCHEM PANEL
2018-09-10 21:24:000.54[facility] HermannCHEM PANEL2018-09-10 21:24:02278Yycbvslz 
HermannCHEM PANEL2018-09-10 21:24:004.2[facility] HermannCHEM IFXLR3459-68-50 
21:24:52530Xvmfkpkn HermannCHEM PANEL2018-09-10 21:24:21508Ztxpcnpa HermannCHEM 
HCORC4829-19-51 21:24:0014[facility]annCHEM PANEL2018-09-10 21:24:009.1
[facility]annCHEM PANEL2018-09-10 21:24:0024[facility] HermannCHEM PANEL
2018-09-10 21:24:0013.2[facility] YocknxaXBEZXTWKJF8363-32-96 21:24:000.1[facility] 
HermannHEMATOLOGY2018-09-10 21:24:0055.3[facility] YrijcazYBCANCOVLY4769-26-90 
21:24:007.0[facility] JfvxjdlDMZTPATLHQ0910-19-23 21:24:0032.0[facility]
SKSQLBTBHO2458-74-93 21:24:003.5[facility]annHEMATOLOGY2018-09-10 21:24:002.2
[facility] RvziefaNNDMEPIPHZ6259-21-74 21:24:002.0[facility]annHEMATOLOGY
2018-09-10 21:24:003.4[facility]annHEMATOLOGY2018-09-10 21:24:000.4[facility] 
HermannHEMATOLOGY2018-09-10 21:24:000.2[facility]annHEMATOLOGY2018-09-10 
21:24:008.0[facility] GxrwzdlOLCUJTCKMT3585-61-87 21:24:07721Jsjaafew [place]
QRRQBJUHZO3363-90-07 21:24:0033.8[facility]annHEMATOLOGY2018-09-10 21:24:00
13.9[facility] ZvydliyBNNZGTKFEL3113-71-95 21:24:0012.5[facility]annHEMATOLOGY
2018-09-10 21:24:0037.1[facility]annHEMATOLOGY2018-09-10 21:24:00* 



             Test Item    Value        Reference Range Interpretation Comments

 

             MCH (test code = MCH) 32.3 pg      27.0-31.0                  





[facility]annHEMATOLOGY2018-09-10 21:24:0095.7[facility]annHEMATOLOGY
2018-09-10 21:24:003.87[facility] RawjjujFEMYPLLXHR7503-90-61 21:24:006.1[facility] 
XsgygbwNIPXZJEHQR4732-94-87 10:16:008.5[facility] NjplxoyMWJZOAUKUZ9636-05-24 
10:16:66845Dtwytexe XtfqzrpTGRLXIWNXP8548-52-47 10:16:0031.4[facility] [place]
SMUMACYIHX2010-16-97 10:16:0093.4[facility] HermannHEMATOLOGY2017-12-10 10:16:00* 



             Test Item    Value        Reference Range Interpretation Comments

 

             MCH (test code = MCH) 31.4 pg      27.0-31.0                  





[facility] DdinztaGVWDZMYHMK6200-83-77 10:16:0033.6[facility]annHEMATOLOGY
2017-12-10 10:16:0013.9[facility] WcwdbnwPAMGCHWKFQ6620-73-76 10:16:003.36[facility]
SrhccyvXCYUIHVFJN2064-01-55 10:16:0010.6[facility] NqpblsvOHHJENTJAO8552-26-76 
10:16:005.8[facility]annHEMATOLOGY2017-12-10 10:16:000.1[facility]
MUDGKRAKYE5574-03-54 10:16:001.8[facility]annHEMATOLOGY2017-12-10 10:16:003.2
[facility] RtkxrpyWFSJUNJABE2245-08-00 10:16:001.2[facility]annHEMATOLOGY
2017-12-10 10:16:0055.4[facility] HgpmsscIFYICSWYIK2787-63-41 10:16:0030.8[facility]
HzqlkkfKIKZBLQYPY3582-36-63 10:16:009.5[facility]annHEMATOLOGY2017-12-10 
10:16:003.1[facility]annHEMATOLOGY2017-12-10 10:16:000.2[facility] [place]
HEMATOLOGY2017-12-10 10:16:000.6[facility] ApjjubkKMNQLQELNC8560-89-91 13:01:00
67.8[facility] PverwqaBQIKZRFOAQ5914-94-38 13:01:009.9[facility]annHEMATOLOGY
2017 13:01:0020.3[facility] ObmjxjaZPEGSWOHLW0128-79-55 13:01:000.6[facility] 
FujssjvZCANPTFIJA9485-73-65 13:01:001.4[facility] WvroheyOJBUPZXBMI7091-51-19 
13:01:001.7[facility] ZqvxfoaDLXYXRJLZB5352-39-31 13:01:005.8[facility] [place]
KNTPMYFFKF1432-73-39 13:01:000.9[facility] WrxwrmzNRZUEGTGVJ3249-46-75 13:01:000.1
[facility] EnykpfyKQIWUGKNOH6590-34-52 13:01:00* 



             Test Item    Value        Reference Range Interpretation Comments

 

             MCH (test code = MCH) 32.9 pg      27.0-31.0                  





[facility] SaqwwnvUCXROZAGRU2609-41-27 13:01:0093.7[facility]
2017 13:01:0035.1[facility] KhsztlxGDHBGHNYSV6495-30-91 13:01:008.4[facility] 
JtzuwcwCGJBUWNBXP1712-79-00 13:01:0013.7[facility] AitnamfRVALZJYXQZ0540-73-47 
13:01:18113Qlrfxxys ExatcgeKWDBEVQWKM3010-33-25 13:01:008.6[facility]
GCJAMIMEYM6926-93-12 13:01:0011.3[facility] NjhyfniZIIMZRFMGT6402-69-62 13:01:00
3.44[facility] TlgikrsIVHPSCNBRA7848-51-26 13:01:0032.3[facility]annUR[facility] AND 
DSNCJ7760-00-29 04:35:00Positive *ABN*(17 10:35 PM)[facility]ann[facility]ARD[facility]AC 
YOXLJLO6845-00-16 02:46:000.8[facility]annC[facility]AC XQPMRHP9690-93-27 02:46:00
25[facility]annCARD[facility]AC DHTQJOT9579-62-78 02:46:58309Cplfluxy HermannCARDIAC 
DASWFBO5738-36-64 02:46:002.4[facility]annCARD[facility]AC KMKJMPW8394-58-39 02:46:00<
0.02[facility]annCHEM HWXGP1897-00-23 02:46:000.9[facility]annCHEM PANEL
2017 02:46:007.6[facility]annCHEM HKYKD1819-80-40 02:46:003.5[facility] 
HermannCHEM DEWOU6219-34-16 02:46:004.1[facility]annCHEM FEITP7157-65-92 
02:46:0040[facility]annCHEM ACDMH0737-72-84 02:46:008.9[facility]annCHE[facility] 
OANGQ5868-59-58 02:46:0015.8[facility]annCHEM FTGDJ6334-11-71 02:46:55180
[facility]annCHEM AOLPE8652-20-51 02:46:003.8[facility]annCHEM PANEL
2017 02:46:0024[facility]annCHEM IRJND4734-09-07 02:46:0038[facility] 
HermannCHEM DZAUY9879-51-14 02:46:0037[facility]annCHEM ULPQU1596-33-56 
02:46:0064[facility]annCHEM YYOHN1150-07-75 02:46:000.6[facility]annCHEM 
TDESC0450-44-11 02:46:22364Qdtvzyfq HermannCHEM ULBVN4116-04-74 02:46:36081
[facility]annCHEM WJOLB7547-01-66 02:46:0022[facility]annCHEM PANEL
2017 02:46:000.55[facility]annCHEM KVTFT8220-92-73 02:46:26053Erslqpmp 
TaxgxgpWHAFTSUIIZ3438-96-19 02:46:000.8[facility] VmbsgfgCGIYCWOHQD7098-86-79 
02:46:006.2[facility] RkgtdvqFCSWRUVHEN2876-45-45 02:46:000.2[facility]
GNXKKONQHC3297-47-68 02:46:009.3[facility] NotfkocVUZWKAGWEN4467-79-69 02:46:000.3
[facility] WytunoxIEQAWJSBZO7111-73-40 02:46:001.2[facility]annHEMATOLOGY
2017 02:46:0011.0[facility] ChzctxkEARFTNOSUS4655-78-45 02:46:0084.0[facility]
KalafebJQHOTKTIGS1024-63-50 02:46:21483Uvrcrzjo QciukktRIYWIILMTL4814-95-66 
02:46:008.6[facility] DowvigcCFQIZZQSYE0401-70-97 02:46:004.01[facility]
QRJZKWEPVK8195-59-02 02:46:0013.0[facility] VftdhjrTKHQYZHYAU0557-54-64 02:46:00
13.8[facility] FnqnjtoBTPOETJJZN2333-55-11 02:46:0034.2[facility]annHEMATOLOGY
2017 02:46:0012.8[facility] ObhiunuYWWRBXSDPR4065-42-54 02:46:00* 



             Test Item    Value        Reference Range Interpretation Comments

 

             MCH (test code = MCH) 32.0 pg      27.0-31.0                  





[facility] YtcourlQWSWTZUCAW9555-56-36 02:46:0093.6[facility]annHEMATOLOGY
2017 02:46:0037.5[facility] HermannCHEM CUPGN8256-08-52 12:27:002.0[facility] 
HermannCHEM CWTIV3881-33-34 12:27:001.6[facility] CipwuswAXYUQZYYOKUM4349-63-70 
12:27:0020[facility] UbdpjtdEQZBISJOHHYM4106-42-33 12:27:0014.5[facility]
YYGQDGKWZWCR6542-13-56 12:27:007.4[facility] VjufxepHPJGRNOAGKJI1064-86-21 
12:27:40992Bgfwjeef QrzejyyPBMZWFMKAEJA4281-18-13 12:27:003.5[facility]
BQYQQUEZUCME7084-04-18 12:27:11430Axplakpw RzdzxjyTCIJUQPETFWJ0898-10-62 
12:27:79617Awibdgrv QkkqiciZBYHPYFNJEUR5417-81-99 12:27:0019[facility]
GAGQQHSUCHWE1398-78-49 12:27:000.42[facility] CdzljdsZBTNRXUJGDFX9159-52-95 
12:27:59626Qbdnfhsu CoqunhzOTKGFVNWBW2407-99-22 12:27:000.3[facility]
LSXWWIWGOU8390-17-31 12:27:005.3[facility] LdasfcxAKUNIHDAHK5165-49-10 12:27:002.7
[facility] AjdgxcqJEPIFOYPSN3611-95-88 12:27:000.9[facility]annHEMATOLOGY
2017 12:27:000.2[facility] TgmoxowJGICCDRQDD9903-88-83 12:27:0058.2[facility] 
WtvyzrlPIONWRCOUY3980-38-53 12:27:0030.0[facility] XbdvbqvGHOMPPTORQ2467-14-34 
12:27:009.5[facility] UtaicikWKQTPSEDLG1723-92-05 12:27:002.0[facility]
FREIZHWIVY3321-43-96 12:27:00* 



             Test Item    Value        Reference Range Interpretation Comments

 

             MCH (test code = MCH) 32.3 pg      27.0-31.0                  





[facility] JuwoucgMHOHSGBTHX3649-82-78 12:27:0032.6[facility]annHEMATOLOGY
2017 12:27:0091.9[facility] ErhpwaaGJPWZRYHGN7821-61-07 12:27:003.54[facility]
PytaiaoKJJNYDVJOI4882-74-45 12:27:0011.5[facility] IpnbyhqRXKAXVCQZE6701-95-73 
12:27:008.1[facility] TajoyvkMHYVZAVJKS1059-91-70 12:27:41312Lihashcu Amol
YKXMSMMKFT8193-42-47 12:27:0013.2[facility] NlnxupyMZZRXQSHFC5613-98-46 12:27:00
35.2[facility] SydznaeFDPQQBXJHJ8612-40-61 12:27:009.0[facility]annCHEM PANEL
2017 08:42:001.7[facility]annCHEM EJJXK3254-34-32 05:08:002.8[facility] 
[facility]ermannURINE AND QGYWL9560-72-89 05:08:004[facility] STOOL
2017 05:08:00Slight *ABN*(17 11:08 PM)[facility] STOOL
2017 05:08:001.023[facility] ZMROI8386-01-12 05:08:00
Negative *NA*(17 11:08 PM)[facility] MJZDY7986-49-57 05:08:00
Negative (17 11:08 PM)[facility] NGJBM0423-15-43 05:08:00<1
[facility] WCMKM1490-38-19 05:08:005.0[facility] 
LYGIT2745-89-01 05:08:00Moderate *ABN*(17 11:08 PM)[facility]
QTPYR1011-13-28 05:08:00Negative (17 11:08 PM)[facility] 
CVQAV5813-92-35 05:08:00Yellow *NA*(17 11:08 PM)[facility]annCARDIAC 
FFFXSPB8109-31-90 02:17:00<0.02[facility]annCHEM CLANW6523-52-06 02:17:001.6
[facility]annCHEM DETKJ1219-46-01 02:17:0097[facility]annCHEM PANEL
2017 02:17:000.4[facility]annCHEM IULHD4714-07-83 02:17:0059[facility] 
HermannCHEM MPSTD6116-11-31 02:17:0027[facility]annCHEM RWFOH1685-17-34 
02:17:007.5[facility]annCHEM UPSSG6451-87-23 02:17:008.3[facility]annCHEM 
GNUNS0323-63-76 02:17:0019[facility]annCHEM CHFLN8845-69-92 02:17:0030
[facility]annCHEM OYHAT5990-39-62 02:17:003.6[facility]annCHEM PANEL
2017 02:17:43448Rdrbzold HermannCHEM LEWHU8165-95-71 02:17:000.64[facility] 
HermannCHEM PNPSL0275-43-36 02:17:0025[facility]annCHEM MWGBZ0618-67-92 
02:17:06801Mckoitlm HermannCHEM RMMFF9602-36-66 02:17:003.7[facility]annCHEM 
MVDAJ1365-34-84 02:17:91799Lpdqcsrx HermannCHEM LOLAX9480-09-20 02:17:000.9
[facility]annCHEM OJWKX2764-73-61 02:17:003.9[facility]annCHEM PANEL
2017 02:17:0039[facility]annCHEM ATBPB9124-31-88 02:17:0016.7[facility] 
HermannCHEM FXRXH4924-53-60 02:17:14815Itytqewh JegueqbFAINRJPMZG2774-94-38 
02:17:004.71[facility] OtympglEYBLIWFUEM5591-41-03 02:17:0018.6[facility]
KITGWVRSJI1896-47-77 02:17:008.4[facility] UbxercbUIXUGGXNCY2670-27-13 02:17:80405
[facility] SzxesllUMYISZYQGQ7209-41-67 02:17:0033.7[facility]annHEMATOLOGY
2017 02:17:0013.6[facility] QrbkyntVIIHUEAHON2234-91-94 02:17:0093.2[facility]
NamwvufCWKWCYCWPE0773-65-70 02:17:00* 



             Test Item    Value        Reference Range Interpretation Comments

 

             MCH (test code = MCH) 31.4 pg      27.0-31.0                  





[facility] NnshwlyUVYUINUONG9762-57-92 02:17:0043.9[facility]annHEMATOLOGY
2017 02:17:0014.8[facility] ZidilubTMWIABMTKO9164-86-53 02:17:00Normal 
(17 8:17 PM)[facility] NgxrrpdYHKVUPEKYD3658-35-28 02:17:004.0[facility]
DKOUSKHGIX4527-68-45 02:17:006.0[facility] LzdporfYQKSVEZIUF4179-49-28 02:17:00
Normal (17 8:17 PM)[facility] WfrtzyfWXLSJILSXA9878-87-14 02:17:001.0[facility] 
CnmkyblBNBHCBOAJZ7962-26-50 02:17:0015.8[facility] LeafzqnVXPWIWDZHL8712-21-26 
02:17:003.0[facility] AanwqelLHJEPKVCBZ9060-94-62 02:17:001.0[facility]
ADNWQYOIJG8303-36-31 02:17:006.0[facility] VwlyyqvYKIDVJPVNL3114-39-84 02:17:00
79.0[facility] VykobjyXIGIWQGDJZ2617-05-41 02:17:000.2[facility]HE[facility]
2017 02:17:001.1[facility] SsaaijuYJPSQXXTPQ0592-97-02 02:17:001.3[facility] 
[facility]ermannURINE AND APPOT3664-30-89 00:08:29Negative (16 7:08 PM)[facility] 
HermannCARDIAC BQBROLG5650-52-62 23:31:00<0.02[facility]annCHEM PANEL
2016 23:31:49669Ijqdmrxb HermannCHEM DXSZT4447-32-89 23:31:0086[facility] 
HermannCHEM UWMQN4654-01-34 23:31:001.0[facility]annCHEM QCOIS3306-46-31 
23:31:0018[facility]annCHEM UYBXM8116-74-24 23:31:004.3[facility]annCHEM 
LBWGW4815-64-67 23:31:0013.4[facility]annCHEM WCCEY0118-99-31 23:31:0064
[facility]annCHEM GYXQV6555-10-79 23:31:0038[facility]annCHEM PANEL
2016 23:31:0038[facility]annCHEM BJOCZ3908-10-93 23:31:004.4[facility] 
HermannCHEM BCMRP6147-39-04 23:31:008.7[facility]annCHEM HHZAV7348-92-54 
23:31:0024[facility]annCHEM UMREH7129-68-99 23:31:0010.4[facility]annCHEM 
UQBOD8799-48-19 23:31:35839Wsqbczke HermannCHEM MOAXO7117-80-73 23:31:0089
[facility]annCHEM HWTLQ5062-20-97 23:31:003.4[facility]annCHEM PANEL
2016 23:31:73958Ubzayzyb HermannCHEM QGUFO6874-56-59 23:31:000.73[facility] 
HermannCHEM XPCKQ6340-07-28 23:31:0013[facility]annCHEM JEHYJ9048-33-18 
23:31:18196Xqbfnbrj HermannCHEM ENEBQ4576-82-28 23:31:000.5[facility]
ZVAVQJFCPO3718-97-19 23:31:005.2[facility] EohobwwRHLVJANSML7175-93-22 23:31:002.7
[facility] PmmmwxkDJIPOHKRAT5595-92-65 23:31:000.8[facility]annHEMATOLOGY
2016 23:31:001.1[facility] BstjxqhTTQNRFAIPX0790-93-25 23:31:000.1[facility] 
CenglipLABXQVOYWS7661-79-12 23:31:0058.0[facility] YrvztyxLBUQKIUKFK0165-13-61 
23:31:009.5[facility] CjodepyTEUUYEIVHT8847-61-00 23:31:0030.6[facility]
HFTMLTWKVD0462-59-08 23:31:000.1[facility] YiusyilJXKIWRLQYS0410-89-55 23:31:000.9
[facility] OqualclGFOEOIVRUS7750-07-48 23:31:008.3[facility]annHEMATOLOGY
2016 23:31:02717Oihlbbdh HlukiqrMSPDXPGKIL9959-24-15 23:31:0013.7[facility] 
FhglhyyBKDFLATMAW8676-92-07 23:31:0045.1[facility] FysrcbpMPGLNBDPWM9621-01-26 
23:31:0090.5[facility] UbgzdbsQOTPESEQDK0532-46-68 23:31:0034.3[facility]
LGYBKGFTTQ5761-16-46 23:31:00* 



             Test Item    Value        Reference Range Interpretation Comments

 

             MCH (test code = MCH) 31.0 pg      27.0-31.0                  





[facility] IklnkvrXQRODAWDCV6811-90-63 23:31:0015.5[facility]annHEMATOLOGY
2016 23:31:008.9[facility] OjfmnlpWUIMVRIFDY0974-33-54 23:31:004.98[facility] 
[facility]ermannURINE AND AYQIL9462-18-89 23:31:002[facility] STOOL
2016 23:31:0015[facility] AND TYHWB2791-27-51 23:31:00Small 
*ABN*(16 6:31 PM)[facility] ALVUM2026-18-98 23:31:00Positive
*ABN*(16 6:31 PM)[facility] PBKHV1860-56-83 23:31:00Moderate
*ABN*(16 6:31 PM)[facility] THJIH9754-52-18 23:31:00Negative
*NA*(16 6:31 PM)[facility] XMTBW0708-63-38 23:31:007.0
[facility] VHXMR8193-35-42 23:31:00Marked *ABN*(16 6:31 PM)
[facility] GWDIP8820-17-64 23:31:001.024[facility]
VYRDP8130-67-09 23:31:00Yellow *NA*(16 6:31 PM)[facility]

## 2020-11-30 NOTE — EMERGENCY DEPARTMENT NOTE
History of Present Illnes


History of Present Illness


Chief Complaint:  Abdominal Complaints


History of Present Illness


This is a 65 year old  female arrives to the ED with several week of 

left lower quadrant abdominal pain associated nausea vomiting. Patient states 

she is concerned that she is relapsing from her breast cancer. Patient states 

she was told she had nodules and concern that her breast cancer spreading.











Chief Complaint Comment PATIENT IN FROM HOME WITH COMPLAINTS OF LEFT LOWER 

ABDOMINAL PAIN OFF AND ON X 2 WEEKS; ALSO WITH COMPLAINTS OF NAUSEA AND VOMITING

X 3 DAYS.  STATES IS SCHEDULED FOR A CYSTOGRAM ON FRIDAY.  RATES PAIN 8/10, RESP

EVEN AND NONLABORED, APPEARS IN NO DISTRESS, AMBULATORY WITH A WALKER


Historian:  Patient


Arrival Mode:  Car


Onset (how long ago):  week(s)


Radiation:  Reports abdomen


Severity:  mild


Duration (how long):  week(s)


Progression:  waxing and waning


Chronicity:  recurrent


Context:  Reports recent illness


Relieving factors:  none





Past Medical/Family History


Physician Review


I have reviewed the patient's past medical and family history.  Any updates have

been documented here.





Past Medical History


Recent Fever:  No


Clinical Suspicion of Infectio:  No


New/Unexplained Change in Ment:  No


Past Medical History:  Hypertension, Diabetes, Cancer, GERD, Hyperlipedemia


Other Medical History:  


BREAST CANCER





POLIO


Past Surgical History:  Appendectomy, 


Other Surgery:  


RIGHT BREAST LUMPECTOMY





RIGHT LEG SURGERY-POLIO





Social History


Smoking Cessation:  Never Smoker


Counseling Performed:  No


Physically hurt or threatened:  No





Other


Last Tetanus:  UTD





Review of Systems


Review of Systems


Constitutional:  Reports no symptoms


EENTM:  Reports no symptoms


Cardiovascular:  Reports no symptoms


Respiratory:  Reports no symptoms


Gastrointestinal:  Reports as per HPI, Reports abdominal pain


Genitourinary:  Reports no symptoms


Musculoskeletal:  Reports no symptoms


Integumentary:  Reports no symptoms


Neurological:  Reports no symptoms


Psychological:  Reports no symptoms


Endocrine:  Reports no symptoms


Hematological/Lymphatic:  Reports no symptoms





Physical Exam


Related Data


Allergies:  


Coded Allergies:  


     Sulfa (Sulfonamide Antibiotics) (Verified  Allergy, Severe, HIVES, 

20)


     naproxen (Verified  Allergy, Severe, HIVES, 20)


Triage Vital Signs





Vital Signs








  Date Time  Temp Pulse Resp B/P (MAP) Pulse Ox O2 Delivery O2 Flow Rate FiO2


 


20 16:21 98.2 82 20 145/98 100 Room Air  








Vital signs reviewed:  Yes





Physical Exam


CONSTITUTIONAL





Constitutional:  Present well-developed, Present well-nourished


HENT


HENT:  Present normocephalic, Present atraumatic, Present oropharynx 

clear/moist, Present nose normal


HENT L/R:  Present left ext ear normal, Present right ext ear normal


EYES





Eyes:  Reports PERRL, Reports conjunctivae normal


NECK


Neck:  Present ROM normal


PULMONARY


Pulmonary:  Present effort normal, Present breath sounds normal


CARDIOVASCULAR





Cardiovascular:  Present regular rhythm, Present heart sounds normal, Present 

capillary refill normal, Present normal rate


GASTROINTESTINAL





Abdominal:  Present soft, Present bowel sounds normal, Present tender


GENITOURINARY





Genitourinary:  Present exam deferred


SKIN


Skin:  Present warm, Present dry


MUSCULOSKELETAL





Musculoskeletal:  Present ROM normal


NEUROLOGICAL





Neurological:  Present alert, Present oriented x 3, Present no gross motor or 

sensory deficits


PSYCHOLOGICAL


Psychological:  Present mood/affect normal, Present judgement normal





Results


Laboratory


Laboratory





Laboratory Tests








Test


 20


17:55 20


16:27








Lab results reviewed:  Yes


Laboratory comments





Laboratory Tests








Test


 20


17:55 20


16:27


 


White Blood Count


 12.07 x10e3/uL


(4.8-10.8) 





 


Red Blood Count


 4.60 x10e6/uL


(3.6-5.1) 





 


Hemoglobin


 13.6 g/dL


(12.0-16.0) 





 


Hematocrit


 40.2 %


(34.2-44.1) 





 


Mean Corpuscular Volume


 87.4 fL


(81-99) 





 


Mean Corpuscular Hemoglobin


 29.6 pg


(28-32) 





 


Mean Corpuscular Hemoglobin


Concent 33.8 g/dL


(31-35) 





 


Red Cell Distribution Width


 16.4 %


(11.7-14.4) 





 


Platelet Count


 312 x10e3/uL


(140-360) 





 


Neutrophils (%) (Auto)


 60.1 %


(38.7-80.0) 





 


Lymphocytes (%) (Auto)


 29.7 %


(18.0-39.1) 





 


Monocytes (%) (Auto)


 7.8  %


(4.4-11.3) 





 


Eosinophils (%) (Auto)


 0.9 %


(0.0-6.0) 





 


Basophils (%) (Auto)


 1.2 %


(0.0-1.0) 





 


Neutrophils # (Auto) 7.3 (2.1-6.9)  


 


Lymphocytes # (Auto) 3.6 (1.0-3.2)  


 


Monocytes # (Auto) 0.9 (0.2-0.8)  


 


Eosinophils # (Auto) 0.1 (0.0-0.4)  


 


Basophils # (Auto) 0.1 (0.0-0.1)  


 


Absolute Immature Granulocyte


(auto 0.04 x10e3/uL


(0-0.1) 





 


Prothrombin Time


 12.9 seconds


(11.9-14.5) 





 


Prothromb Time International


Ratio 0.93 


 





 


Activated Partial


Thromboplast Time 27.6 seconds


(23.8-35.5) 





 


Sodium Level


 141 mmol/L


(136-145) 





 


Potassium Level


 4.0 mmol/L


(3.5-5.1) 





 


Chloride Level


 108 mmol/L


() 





 


Carbon Dioxide Level


 22 mmol/L


(22-29) 





 


Anion Gap


 15.0 mmol/L


(8-16) 





 


Blood Urea Nitrogen


 15 mg/dL


(7-26) 





 


Creatinine


 0.60 mg/dL


(0.57-1.11) 





 


Estimat Glomerular Filtration


Rate > 60 ML/MIN


(60-) 





 


BUN/Creatinine Ratio 25 (6-25)  


 


Glucose Level


 89 mg/dL


() 





 


Calcium Level


 9.6 mg/dL


(8.4-10.2) 





 


Magnesium Level


 1.3 MG/DL


(1.3-2.1) 





 


Total Bilirubin


 0.3 mg/dL


(0.2-1.2) 





 


Aspartate Amino Transf


(AST/SGOT) 30 IU/L (5-34) 


 





 


Alanine Aminotransferase


(ALT/SGPT) 26 IU/L (0-55) 


 





 


Alkaline Phosphatase


 93 IU/L


() 





 


Total Protein


 7.8 g/dL


(6.5-8.1) 





 


Albumin


 4.2 g/dL


(3.5-5.0) 





 


Globulin


 3.6 g/dL


(2.3-3.5) 





 


Albumin/Globulin Ratio 1.2 (0.8-2.0)  


 


Lipase 58 U/L (8-78)  


 


Urine Color


 


 Yellow


(YELLOW)


 


Urine Clarity  Cloudy (CLEAR) 


 


Urine pH  6 (5 - 7) 


 


Urine Specific Gravity


 


 >=1.030


(1.010-1.025)


 


Urine Protein  2+ (NEGATIVE) 


 


Urine Glucose (UA)


 


 Negative


(NEGATIVE)


 


Urine Ketones


 


 Negative


(NEGATIVE)


 


Urine Blood


 


 Trace


(NEGATIVE)


 


Urine Nitrite


 


 Negative


(NEGATIVE)


 


Urine Bilirubin


 


 Negative


(NEGATIVE)


 


Urine Urobilinogen


 


 0.2 mg/dL (0.2


- 1)


 


Urine Leukocyte Esterase


 


 Negative


(NEGATIVE)


 


Urine RBC  0-5 /HPF (0-5) 


 


Urine WBC  0-5 /HPF (0-5) 


 


Urine Epithelial Cells


 


 Few /LPF


(NONE)


 


Urine Bacteria


 


 Rare /HPF


(NONE)











Imaging


Impressions


IMPRESSION: 


1.  No acute abdominopelvic abnormality identified.


2.  L1 vertebral body compression fracture of indeterminate age with 50% loss


of height anteriorly. Correlate with point tenderness and history of fall.


3.  Chronic T12 vertebral body fracture status post vertebroplasty.


4.  1.5 cm nodule in the right middle lobe which may represent mucous plugging


or solid lung nodule. However, there are no prior images for comparison.


Recommend nonemergent complete assessment of the lungs with chest CT.


5.  Left adnexal cystic structure which measures approximately 1.6 cm. Cystic


adnexal structures in postmenopausal patients are abnormal. Recommend


nonemergent evaluation with dedicated pelvic ultrasound.


6.  Nonobstructive right nephrolithiasis.


7.  Hepatic steatosis.


8.  Asymmetric atrophy of the right gluteal musculature of indeterminate cause.





Signed by: Edmar Gonzalez MD on 2020 8:18 PM





Assessment & Plan


Medical Decision Making


MDM


65-year-old female arrived to the ED with nonspecific abdominal pain. History of

malignancy. CT findings concerning for possible recurrence. On his discussed 

with patient, recommendation outpatient oncology follow-up. Patient expressed 

understanding and was stable for discharge





Assessment & Plan


Final Impression:  


(1) Left lower quadrant abdominal pain


Depart Disposition:  HOME, SELF-CARE


Last Vital Signs











  Date Time  Temp Pulse Resp B/P (MAP) Pulse Ox O2 Delivery O2 Flow Rate FiO2


 


20 16:21 98.2 82 20 145/98 100 Room Air  








Home Meds


Active Scripts


Tramadol Hcl (ULTRAM) 50 Mg Tablet, 50 MG PO Q6HR PRN for ABDOMINAL PAIN, #14 

TAB


   Prov:RENETTA BECK, DO         20


Acetaminophen With Codeine (TYLENOL WITH CODEINE #3 TABLET) 1 Each Tablet, 300 

MG PO Q4H PRN for PAIN, #15 TAB


   Prov:UZMA LAMB NP         18


Medications in the ED





Pantoprazole Sodium 40 mg ONCE  ONCE IV ;  Start 20 at 17:30;  Stop 

20 at 17:31;  Status DC


Ondansetron HCl 4 mg ONCE  STAT IV ;  Start 20 at 16:57;  Stop 20 at

17:07;  Status DC


Sodium Chloride 1,000 ml @  0 mls/hr Q0M STAT IV ;  Start 20 at 16:57;  

Stop 20 at 17:00;  Status DC


Morphine Sulfate 4 mg ONCE  PRN IV SEVERE PAIN (7-10);  Start 20 at 18:15;

 Stop 20 at 18:14;  Status RENETTA DELEON DO            2020 18:07

## 2020-11-30 NOTE — XMS REPORT
Continuity of Care Document

                             Created on: 2020



JULIO YOUNG

External Reference #: [id number]

: 1955

Sex: Female



Demographics





                          Address                   [address] [place] [address]

 

                          Home Phone                [phone]

 

                          Preferred Language        English

 

                          Marital Status            Unknown

 

                          Mosque Affiliation     Unknown

 

                          Race                      Unknown

 

                          Ethnic Group              Unknown





Author





                          Author                    [email or web address], JULIO Segovia              [email or web address]

 

                          Address                   Unknown

 

                          Phone                     Unavailable







Care Team Providers





                    Care Team Member Name Role                Phone

 

                    Part[employer] Information Exchange Unavailable         Un

available



                                    



Problems

                    



                    Problem                         Status                      

   Onset Date       

                          Classification                         Date Reported  

         

                          Comments                         Source               

     

 

                          DX: FRACTURE MID FOOT RT/OSTEOPOROSIS                 

        Active            

                    2019                                                  

        

                                                    [facility]                

    

 

                    Low back pain                                               

   2019                         

   

                                        [facility]                    

 

                    FLANK PAIN                         Active                   

      2019    

                                                                                

                                        [facility]                    

 

                    Unspecified abdominal pain                                  

                

09/10/2018                                                   2019         

 

                                                    [facility]                

    

 

                          Sacrococcygeal disorders, not elsewhere classified    

                          

                    04/15/2018                                                  

07/15/2018                                                   [facility]       

 

           

 

                          Unspecified fall, initial encounter                   

                          

                    2018                                                  

07/15/2018      

                                                    [facility]                

    

 

                    FALL                         Active                         

2018          

                                                                                

      

                                        [facility]                    

 

                    ACUTE UPPER GI BLEED                         Active         

                

2017                                                                      

 

                                                    [facility]                

    

 

                    UNK                         Active                         0

2017           

                                                                                

       

                                        [facility]                    

 

                                        Strain of muscle, fascia and tendon at n

abilio level, initial encounter            

                                             2017                                                  

[facility]                    

 

                          Unspecified injury of head, initial encounter         

                          

                    2017                                                   [facility]       

 

           

 

                    DEHYDRATION                         Active                  

       2017   

                                                                                

                                        [facility]                    

 

                    VOMITING                         Active                     

    2017      

                                                                                

  

                                        [facility]                    

 

                          Discharge Diagnosis: Abdominal pain                   

                          

                    2016      

                                                    [facility]                

    

 

                          Discharge Diagnosis: Acid reflux disease              

                          

                    2016 

                                                    [facility]                

    

 

                          Discharge Diagnosis: Urinary tract infection          

                          

                    2016                                                   [facility]       

 

           

 

                    VOMITING BLOOD                         Active               

          2016

                                                                                

                                        [facility]                    

 

                    R06.02/ R94.31                         Active               

          2016

                                                                                

                                        [facility]                    

 

                    4 WK FU                         Active                      

   06/15/2016       

                                                                                

   

                                        [facility]                 

   

 

                    CARDIAC CLEARANCE                         Active            

             

2016                                                                      

 

                                                    [facility]     

               

 

                          Benign hypertension (disorder)                        

 Active                   

                                             Problem                         2019        

                                                    Formerly [facility] [place]                    

 

                          Dyspnea on exertion (finding)                         

Active                    

                                             Problem                         2019         

                                                    Formerly [facility] [place]                    

 

                    Abnormal ECG (finding)                         Active       

                    

                          Problem                         2019            

    

                                                    Formerly [facility]                    

 

                          Hypertensive disorder, systemic arterial (disorder)   

                      

Resolved                                                   Problem              

 

                    2019                                                  

[facility]                    

 

                          Malignant tumor of breast (disorder)                  

       Resolved           

                                             Problem                         2019

                                                    Formerly [facility], [place]

H 

[facility]                    

 

                          Nausea with vomiting, unspecified                     

                          

                                                                      2019

                  

                                                    [facility]                

    

 

                    Dysuria                                                     

                    

                                             2019                         

                   

                                        [facility]                    

 

                          Essential (primary) hypertension                      

                          

                                                                      2019

                   

                                                    [facility]                

    

 

                          Type 2 diabetes mellitus without complications        

                          

                                                                      2019

     

                                                    [facility]                

    

 

                          Personal history of urinary calculi                   

                          

                                                                      2019

                

                                                    [facility]                

    

 

                          Nicotine dependence, cigarettes, uncomplicated        

                          

                                                                      2019

     

                                                    [facility]                

    

 

                          Late effects of poliomyelitis (disorder)              

           Resolved       

                                             Problem                         

2019                                                   [facility]                    

 

                          Diabetes mellitus type 2 (disorder)                   

      Resolved            

                                             Problem                         2019 

                                                    [facility]  

    

             

 

                          Gastro-esophageal reflux disease without esophagitis  

                          

                                                                       

07/15/2018                                                   [facility]       

 

           

 

                                        Fall on same level due to ice and snow, 

initial encounter                       

                                                                                

           

07/15/2018                                                   [facility]       

 

           

 

                          Personal history of nicotine dependence               

                          

                                                                      07/15/2018

            

                                                    [facility]                

    

 

                          Personal history of malignant neoplasm of breast      

                          

                                                                      2019

   

                                                    [facility]                

    

 

                          Personal history of poliomyelitis                     

                          

                                                                      07/15/2018

                  

                                                    [facility]                

    

 

                          Urinary tract infection, site not specified           

                          

                                                                      2019

        

                                                    [facility]                

    

 

                    Calculus of kidney                                          

                    

                                             2019                         

        

                                        [facility]                    

 

                          Other long term (current) drug therapy                

                          

                                                                      2019

             

                                                    [facility]                

    

 

                          Major depressive disorder, single episode, moderate   

                      

Active                                                   Problem                

 

                    2019                                                  

[facility]                    

 

                          Anxiety disorder, unspecified                         

Active                    

                                             Problem                                  

                                                    [facility]   

            

    

 

                          Insomnia due to other mental disorder                 

        Active            

                                             Problem                          

                                                    [facility]   

    

            

 

                          PATHOLOGICAL FRACTURE, RIGHT FOOT, INIT               

          Active          

                                                                                

      

                                                    [facility]                

    

 

                          AGE-RELATED OSTEOPOROSIS W/O CURRENT PAT              

           Active         

                                                                                

     

                                                    [facility]                

    

 

                          OTH OSTEOPOR W CRNT PATH FX, R ANK/FT, 7              

           Active         

                                                                                

     

                                                    [facility]                

    

 

                          ENCNTR FOR GENERAL ADULT MEDICAL EXAM W/              

           Active         

                                                                                

     

                                                    [facility]     

               

 

                    SHORTNESS OF BREATH                         Active          

                    

                                                                                

 

                                        [facility]                    

 

                          ABNORMAL ELECTROCARDIOGRAM [ECG] [EKG]                

         Active           

                                                                                

      

                                                    [facility]            

        

 

                    DEHYDRATION                         Active                  

                    

                                                                                

        

                                        [facility]                    

 

                          RADICULOPATHY, LUMBAR REGION                         A

ctive                     

                                                                                

            

                                        [facility]                    

 

                          GASTROINTESTINAL HEMORRHAGE, UNSPECIFIED              

           Active         

                                                                                

    

                                                    [facility]                

    



                                                                                
                                                                                
                                                                                
                                                                                
                                                                                
                                                                                
                                                                                
                                                                                
                                                                                
                                                                                
                                                                                
                        



Medications

                    



                    Medication                         Details                  

       Route        

                          Status                         Patient Instructions   

         

                          Ordering Provider                         Order Date  

             

                                        Source                    

 

                          cephalexin 500 mg oral capsule                        

 500 mg = 1 cap, PO, TID, 

X 10 day, # 30 cap, 0 Refill(s)                                                 

                    No Longer Active                                            

                   

                          2019                         [facility]       

             

 

                                        Acetaminophen 300 MG / Codeine Phosphate

 30 MG Oral Tablet [Tylenol with Codeine

 #3]                                    1 - 2 tab, PO, TID, PRN Pain, X 3 day, #

 15 tab, 0 

Refill(s)                                                   No Longer Active    

 

                                                                      2019

 

                                        [facility]                    

 

                          tizanidine 4 mg oral tablet                         1/

2-1 tab, PO, Q8H, PRN for 

muscle spasms, # 20 tab, 0 Refill(s)                                            

                    Active                                                      

               

                          2019                         [facility]       

             

 

                          Fentanyl                         Notes: (Same as: Subl

imaze)  Preservative free.

                                                    Inactive                    

   

                                                                      2019

                   

                                        [facility]                    

 

                          Ceftriaxone                         Notes: (Same As: R

debphin).  Use with 100 mL

NS and infuse over 30 min   *** MEDICATION WASTE *** Product Size:  1000 mg 
Product Wasted:  ___ mg                                                   

Inactive                                                                        

 

                          2019                         [facility]       

             

 

                          Ondansetron                         Notes: (Same as: BRI grant)   *** MEDICATION 

WASTE *** Product Size:  4 mg Product Wasted:  ___ mg                           

                      Inactive                                                  

                          2019                         [facility]       

  

          

 

                          Morphine                         Notes: (Same as:MORPh

ine Sulfate)              

                                             Inactive                           

          

                                             2019                         

[facility]                    

 

                          Sodium Chloride 0.9% (Bolus) IV                       

  1,000 mL, 1000 ml/hr, 

Infuse Over: 1 hr, Route: IV, 1,000, Drug form: INJ, ONCE, Priority: STAT, 
Dosing Weight 67.273 kg, Start date: 19 19:08:00 CST, Stop date: 19 
19:08:00 CST                                                   Inactive         

 

                                                                      2019

      

                                        [facility]                    

 

                          Ondansetron 4 MG Disintegrating Tablet [Zofran]       

                  4 mg = 1

 tab, PO, BID, PRN Nausea and Vomiting, Dissolve tab under tongue, # 10 tab, 0 
Refill(s)                                                   Active              

 

                                                                      2018

          

                                        [facility]                    

 

                          tramadol hydrochloride 50 MG Oral Tablet              

           50 mg = 1 tab, 

PO, Q6H, not to exceed 400 mg/dayofran, X 5 day, # 20 tab, 0 Refill(s)          
                                                    No Longer Active            

            

                                                                      2018

                   

                                        [facility]                    

 

                                        Acetaminophen 325 MG / Hydrocodone Mirna

trate 5 MG Oral Tablet [Norco 5/325]    

                                        Notes: (Same as: Norco 325/5)  Do not ex

ceed 4gm/day of 

acetaminophen.                                                   Inactive       

 

                                                                      2018

    

                                        [facility]                    

 

                          Ketorolac                          4 days   *** MEDICA

TION WASTE *** Product 

Size:  30 mg Product Wasted:  ___ mg                                            

                    Inactive                                                    

               

                          2018                         [facility]       

             

 

                          Ondansetron                         Notes: (Same as: BRI grant)   *** MEDICATION 

WASTE *** Product Size:  4 mg Product Wasted:  ___ mg                           

                      Inactive                                                  

                          09/10/2018                         [facility]       

  

          

 

                          Morphine                         Notes: (Same as:MORPh

ine Sulfate)              

                                             Inactive                           

          

                                             09/10/2018                         

[facility]                    

 

                          Saline Flush 0.9%                         Notes: (Same

 as: BD Posiflush)        

                                                    No Longer Active            

           

                                                                      09/10/2018

                   

                                        [facility]                    

 

                          tramadol hydrochloride 50 MG Oral Tablet [Ultram]     

                    50 mg 

= 1 tab, PO, Q4H, PRN pain, X 3 day, # 20 tab, 0 Refill(s)                      
                                             No Longer Active                   

                 

                                             2018                         

[facility]                    

 

                                        Acetaminophen 325 MG / Hydrocodone Mirna

trate 5 MG Oral Tablet [Norco 5/325]    

                                        1 tab, Route: PO, Drug Form: TAB, Dosing

 Weight 68.182, kg, 

ONCE, STAT, Start date: 18 14:08:00 CDT, Stop date: 18 14:08:00 CDT 
                                                    Inactive                    

    

                                                                      2018

                    

                                        [facility]                    

 

                          Ketorolac                         30 mg, Route: IM, Dr

ug form: INJ, ONCE, Dosing

Weight 68.182, kg, Priority: STAT, Start date: 18 14:08:00 CDT, Stop date:
18 14:08:00 CDT                                                   Inactive

 

                                                                         

2018                              [facility]                    

 

                          omeprazole 40 mg oral delayed release capsule         

                40 mg = 1 

cap, PO, BID-Before Meals, # 60 cap, 0 Refill(s)                                

                    Active                                                      

   

                          12/10/2017                         [facility]       

         

   

 

                          Sodium Chloride 0.9% IV 1,000 mL                      

   1,000 mL, Rate: 25 

ml/hr, Infuse over: 40 hr, Route: IV, Dosing Weight 68.182 kg, Total Volume: 
1,000, Start date: 12/10/17 10:12:00 CST, Duration: 1 day, Stop date: 17 
10:11:00 CST, 1.74, m2                                                   Inactiv

e

                                                                          

12/10/2017                              [facility]                    

 

                          potassium chloride 20 mEq oral tablet, extended releas

e                         

20 mEq, 1 tab, Route: PO, ONCE, Dosing Weight 68.182, kg, Start date: 17 
17:20:00 CST, Stop date: 17 17:20:00 CST                                  

                    Inactive                                                    

     

                          2017                         [facility]       

         

   

 

                          influenza virus vaccine, inactivated                  

       Notes: (Same as: 

Fluzone Quadrivalent, Fluarix Quadrivalent)  For 3 years of age and older (0.5 
mL IM) Shake well before use                                                   

Inactive                                                                        

 

                          2017                         [facility]       

             

 

                          Saline Flush 0.9%                         Notes: (Same

 as: BD Posiflush)        

                                                    No Longer Active            

           

                                                                      2017

                   

                                        [facility]                    

 

                                        Acetaminophen 325 MG / Hydrocodone Mirna

trate 10 MG Oral Tablet [Norco 10/325]  

                                        1 tab, PO, Q6H, PRN for pain, # 24 tab, 

0 Refill(s)       

                                             Active                             

   

                                             2017                         

[facility]                    

 

                          Vitamin D3                         See Instructions, 1

,000 IntlUnit, 0 Refill(s)

                                                 Active                         

                                                    2017                  

   

                                        [facility]                    

 

                          Citalopram 10 MG Oral Tablet [Celexa]                 

        10 mg = 1 tab, PO,

 Daily, # 30 tab, 0 Refill(s)                                                   

Active                                                                          

 

                          2017                         [facility]       

             

 

                          metoprolol 25 mg oral tablet, extended release        

                 25 mg = 1

 tab, PO, Daily, # 30 tab, 0 Refill(s)                                          

                    Active                                                      

            

                          2017                         [facility]       

             

 

                                        Enalapril Maleate 5 MG / Hydrochlorothia

zide 12.5 MG Oral Tablet                

                          1 tab, PO, Daily, # 30 tab, 0 Refill(s)               

                 

                    Active                                                      

  

                          2017                         [facility]       

       

      

 

                          Zofran                         Notes: (Same as: Zofran

)   *** MEDICATION WASTE 

*** Product Size:  4 mg Product Wasted:  ___ mg                                 

                          No Longer Active                                      

         

                                             2017                         

[facility]     

               

 

                          Morphine                         Notes: (Same as:MORPh

ine Sulfate)              

                                             No Longer Active                   

         

                                                    2017                  

     

                                        [facility]                    

 

                    Ativan                         Notes: (Same as: Ativan)     

                    

                          No Longer Active                                      

 

                                             2017                         

[facility]                    

 

                          Sodium Chloride 0.9% (Bolus) IV                       

  1,000 mL, 1000 ml/hr, 

Infuse Over: 1 hr, Route: IV, 1,000, Drug form: INJ, ONCE, Priority: STAT, 
Dosing Weight 68.182 kg, Start date: 17 0:29:00 CST, Stop date: 17 
0:29:00 CST                                                   Inactive          

 

                                                                      2017

      

                                        [facility]                    

 

                          pantoprazole 80 mg + Sodium Chloride 0.9%  mL   

                      100 

mL, Rate: 10 ml/hr, Infuse over: 10 hr, Route: IVPB, Dosing Weight 68.182 kg, 
Total Volume: 100, Infuse at 8 mg / hr for 72 hours for GI bleeding, Start date:
 17 0:27:00 CST, Duration: 72 hr, Stop date: 17 0:26:00 CST, 1.74, 
m2                                                   Inactive                   

 

                                                                      2017

               

                                        [facility]                    

 

                          Protonix                         Notes: For IV push re

constitute with 10 ml 0.9%

 sodium chloride and push over 2 minutes. (Same as: Protonix)                   
                                             Inactive                           

              

                                             2017                         

[facility]                    

 

                          Fentanyl                         Notes: (Same as: Subl

imaze)  Preservative free.

                                                    Inactive                    

  

                                                                      2017

                 

                                        [facility]                    

 

                          Zofran                         Notes: (Same as: Zofran

)   *** MEDICATION WASTE 

*** Product Size:  4 mg Product Wasted:  ___ mg                                 

                    Inactive                                                    

   

                          2017                         [facility]       

      

       

 

                          Sodium Chloride 0.9% (Bolus) IV                       

  1,000 mL, 1000 ml/hr, 

Infuse Over: 1 hr, Route: IV, 1,000, Drug form: INJ, ONCE, Priority: STAT, 
Dosing Weight 68.182 kg, Start date: 17 21:49:00 CST, Stop date: 17 
21:49:00 CST                                                   Inactive         

 

                                                                      2017

     

                                        [facility]                    

 

                          Saline Flush 0.9%                         Notes: (Same

 as: BD Posiflush)        

                                                    No Longer Active            

          

                                                                      2017

                 

                                        [facility]                    

 

                          Ondansetron 4 MG Disintegrating Tablet [Zofran]       

                  4 mg = 1

 tab, PO, BID, PRN Nausea and Vomiting, Dissolve tab under tongue, # 10 tab, 0 
Refill(s)                                                   Active              

 

                                                                      2017

          

                                        [facility]                    

 

                          tramadol hydrochloride 50 MG Oral Tablet [Ultram]     

                    50 mg 

= 1 tab, PO, Q6H, PRN pain, NO driving while under the influence of this 
medication, X 3 day, # 12 tab, 0 Refill(s)                                      

                    Active                                                      

        

                          2017                         [facility]       

             

 

                          Motrin                         Notes: (Same as: Motrin

) "Do Not Crush"  Give 

with food.                                                   No Longer Active   

 

                                                                       

2017                              [facility]                    

 

                                        Acetaminophen 325 MG / Hydrocodone Mirna

trate 5 MG Oral Tablet [Norco 5/325]    

                                        Notes: (Same as: Norco 325/5)  Do not ex

ceed 4gm/day of 

acetaminophen.                                                   No Longer Activ

e

                                                                          

2017                              [facility]                    

 

                                        Budesonide 0.032 MG/ACTUAT Metered Dose 

Nasal Spray [Rhinocort]                 

                                        32 microgram, Route: Each Affected Nostr

il, Drug form: SPRY, Daily, 

Dosing Weight 70.142, kg, Start date: 17 9:00:00 CST, Duration: 30 day, 
Stop date: 17 9:00:00 CST                                                 

                    No Longer Active                                            

                   

                          2017                         [facility]       

             

 

                          Omeprazole                         40 mg, Route: PO, D

rug form: DRC, Daily, 

Dosing Weight 70.142, kg, Start date: 17 9:00:00 CST, Duration: 30 day, 
Stop date: 17 9:00:00 CST                                                 

                    No Longer Active                                            

                   

                          2017                         [facility]       

             

 

                          Fenofibrate 160 MG Oral Tablet                        

 Notes: (Same as: Tricor) 

                                                    No Longer Active            

   

                                                                      2017

          

                                        [facility]                    

 

                          Atenolol 50 MG Oral Tablet                         Not

es: (Same As:Tenormin)    

                                                    No Longer Active            

      

                                                                      2017

             

                                        [facility]                    

 

                          Protonix                         Notes: Tablet should 

not be chewed or crushed. 

(Same as: Protonix)                                                   No Longer 

Active                                                                          

 

                          2017                         [facility]       

             

 

                          cyclobenzaprine                         Notes: (Same A

s: Flexeril)              

                                             Inactive                           

         

                                             2017                         

[facility]                    

 

                          fluticasone nasal 0.05 mg/inh spray                   

      Notes: (Same as: 

Flonase)                                                   Inactive             

 

                                                                      2017

         

                                        [facility]                    

 

                          Omnicef                         Notes: (Same As: Omnic

ef)                       

                                             Inactive                           

                  

                                             2017                         

[facility]   

                 

 

                          Vantin                         200 mg, Route: PO, Drug

 form: TAB, [id number], 

Dosing Weight 70.142, kg, Start date: 17 11:00:00 CST, Duration: 30 day, 
Stop date: 17 23:00:00 CST                                                

                    Inactive                                                    

                  

                          2017                         [facility]       

             

 

                          cefpodoxime 200 MG Oral Tablet [Vantin]               

          200 mg, PO, 

[id number], X 10 day, # 20 tab, 0 Refill(s)                                        

                    Active                                                      

          

                          2017                         [facility]       

             

 

                                        Budesonide 0.032 MG/ACTUAT Metered Dose 

Nasal Spray [Rhinocort]                 

                          = 2 spray, NASAL, Daily, # 1 ea, 0 Refill(s)          

                  

                      Active                                                    

                          2017                         [facility]       

   

          

 

                          Sodium Chloride 0.154 MEQ/ML Injectable Solution      

                   1,000 

mL, Rate: 100 ml/hr, Infuse over: 10.1 hr, Route: IV, Dosing Weight 70.142 kg, 
Total Volume: 1,011.2, Priority: NOW, Start date: 17 10:47:00 CST, 
Duration: 3 day, Stop date: 02/10/17 10:46:00 CST                               

                    Inactive                                                    

 

                          2017                         [facility]       

    

         

 

                          Morphine                         Notes: (Same as:MORPh

ine Sulfate)              

                                             Inactive                           

         

                                             2017                         

[facility]                    

 

                          Ondansetron                         Notes: (Same as: BRI grant)   *** MEDICATION 

WASTE *** Product Size:  4 mg Product Wasted:  ___ mg                           

                          Inactive                                              

   

                                             2017                         

[facility]       

             

 

                                        Acetaminophen 325 MG / Hydrocodone Mirna

trate 5 MG Oral Tablet                  

                                        Notes: (Same as: Norco 325/5)  Do not ex

ceed 4gm/day of acetaminophen.   

                                               Inactive                         

                                                    2017                  

  

                                        [facility]                    

 

                          Sodium Chloride 0.154 MEQ/ML Injectable Solution      

                   1,000 

mL, Infuse Over: 1 hr, Route: IV, ONCE, Priority: STAT, Dosing Weight 67.273 kg,
 Start date: 17 3:43:00 CST, Duration: 1 doses or times, Stop date: 
17 3:43:00 CST                                                   Inactive 

 

                                                                         

2017                              [facility]                    

 

                          sodium chloride 0.9% 1000 ml INJ 1,000 mL             

            1,000 mL, 

Rate: 125 ml/hr, Infuse over: 8 hr, Route: IV, Dosing Weight 67.273 kg, Total 
Volume: 1,000, Start date: 17 3:43:00 CST, Duration: 30 day, Stop date: 
17 3:42:00 CST                                                   Inactive 

 

                                                                         

2017                              [facility]                    

 

                          Morphine                         4 mg, Route: IVP, ONC

E, Dosing Weight 67.273, 

kg, Priority: STAT, Start date: 17 0:46:00 CST, Stop date: 17 
0:46:00 CST                                                   Inactive          

 

                                                                      2017

      

                                        [facility]                    

 

                          Sodium Chloride 0.154 MEQ/ML Injectable Solution      

                   1,000 

mL, Infuse Over: 1 hr, Route: IV, ONCE, Priority: STAT, Dosing Weight 67.273 kg,
 Start date: 17 23:43:00 CST, Duration: 1 doses or times, Stop date: 
17 23:43:00 CST                                                   No Longe

r

 Active                                                                         

 

                          2017                         [facility]       

             

 

                          Saline Flush 0.9%                         Notes: (Same

 as: BD Posiflush)        

                                                    No Longer Active            

          

                                                                      2017

                 

                                        [facility]                    

 

                          omeprazole 40 mg oral delayed release capsule         

                40 mg = 1 

cap, PO, Daily, # 30 cap, 0 Refill(s)                                           

                    Active                                                      

             

                          2016                         [facility]       

             

 

                          Metoclopramide 10 MG Oral Tablet [Reglan]             

            10 mg = 1 tab,

 PO, QID, X 7 day, # 28 tab, 0 Refill(s)                                        

                    Active                                                      

          

                          2016                         [facility]       

             

 

                          ciprofloxacin 500 mg oral tablet                      

   500 mg = 1 tab, PO, 

Q12H, X 7 day, # 14 tab, 0 Refill(s)                                            

                    Active                                                      

              

                          2016                         [facility]       

             

 

                          Ciprofloxacin                         500 mg, Route: P

O, ONCE, Dosing Weight 

71.818, kg, Priority: STAT, Start date: 16 19:41:00 CDT, Stop date: 
16 19:41:00 CDT                                                   Inactive

 

                                                                          

2016                              [facility]                    

 

                          potassium chloride                         40 mEq, Rou

te: PO, Drug form: ERTAB, 

ONCE, Dosing Weight 71.818, kg, Priority: STAT, Start date: 16 19:40:00 
CDT, Stop date: 16 19:40:00 CDT                                           

                    Inactive                                                    

             

                          2016                         [facility]       

             

 

                                        Acetaminophen 325 MG / Hydrocodone Mirna

trate 10 MG Oral Tablet                 

                          1 tab, PO, BID, PRN Pain Score 1-5                    

                  

                    Active                                                      

        

                          2016                         [facility]       

             

 

                          Fenofibrate 160 MG Oral Tablet                        

 160 mg = 1 tab, PO, Daily

                                                    Active                      

  

                                                                      2016

                   

                                        [facility]                    

 

                          gabapentin 300 MG Oral Capsule                        

 300 mg = 1 cap, PO, TID  

                                                Active                          

                                                    2016                  

   

                                        [facility]                    

 

                          cyclobenzaprine 10 mg oral tablet                     

    10 mg = 1 tab, PO, BID

                                                    Active                      

  

                                                                      2016

                   

                                        [facility]                    

 

                          rOPINIRole 0.5 mg oral tablet                         

0.5 mg = 1 tab, PO, 

Bedtime                                                   Active                

 

                                                                      2016

            

                                        [facility]                    

 

                          Morphine                         4 mg, Route: IVP, ONC

E, Dosing Weight 71.818, 

kg, Priority: STAT, Start date: 16 18:56:00 CDT, Stop date: 16 
18:56:00 CDT                                                   Inactive         

 

                                                                      2016

     

                                        [facility]                    

 

                          Sodium Chloride 0.154 MEQ/ML Injectable Solution      

                   1,000 

mL, 2,000 ml/hr, Infuse Over: 30 minutes, Route: IV, ONCE, Priority: STAT, 
Dosing Weight 71.818 kg, Start date: 16 18:56:00 CDT, Duration: 1 doses or
 times, Stop date: 16 18:56:00 CDT                                        

                    Inactive                                                    

          

                          2016                         [facility]       

             

 

                          Ondansetron                         4 mg, Route: IVP, 

Drug form: INJ, ONCE, 

Dosing Weight 71.818, kg, Priority: STAT, Start date: 16 18:56:00 CDT, 
Stop date: 16 18:56:00 CDT                                                

                    Inactive                                                    

                  

                          2016                         [facility]       

             

 

                          pantoprazole                         40 mg, Route: IVP

, ONCE, Dosing Weight 

71.818, kg, Priority: STAT, Start date: 16 18:56:00 CDT, Stop date: 
16 18:56:00 CDT                                                   Inactive

 

                                                                          

2016                              [facility]                    

 

                          Saline Flush 0.9%                         Notes: (Same

 as: BD Posiflush)        

                                             Inactive                           

   

                                             2016                         

[facility]                    

 

                          Atenolol 50 MG Oral Tablet                         50 

mg = 1 tab, PO, Daily, 0 

Refill(s)                                                   Active              

 

                                                                      06/15/2016

          

                                        [facility]                 

   

 

                                        Enalapril Maleate 10 MG / Hydrochlorothi

azide 25 MG Oral Tablet                 

                          1 tab, PO, Daily, 0 Refill(s)                         

                  

                    Active                                                      

             

                          06/15/2016                         [facility]

nter              

      



                                                                                
                                                                                
                                                                                
                                                                                
                                                                                
                                                                                
                                                                                
                                                                                
                                                                                
                                                                                
                                                                                
                                                                                
                                                                                
                                                                                
                                                                                
                                                                                
                                                    



Allergies, Adverse Reactions, Alerts

                    



                    Substance                         Category                  

       Reaction     

                          Severity                         Reaction type        

      

                    Status                         Date Reported                

         

Comments                                Source                    

 

                    sulfa drugs                         Assertion               

                    

                                             Drug allergy                       

  

Active                                                                          

 

                                        [facility]                    

 

                    Naprosyn                         Assertion                  

                    

                                             Drug allergy                       

  Active

                                                                           [facility]                    



                                                                



Immunizations

                    



                    Immunization                         Date Given             

            Site    

                          Status                         Last Updated           

     

                          Comments                         Source               

     

 

                          influenza virus vaccine, inactivated                  

       2017         

                          Left deltoid                         completed        

          

                    [place]                                                  [facility]

her Neuro,[facility]                    



                                            



Results





                    Order Name                         Results                  

       Value        

                          Reference Range                         Date          

         

                    Interpretation                         Comments             

            

Source                    

 

                CHEM PANEL                         POC Creatinine  0.3          

               

0.5 - 1.4                         2019                                    

                                                    [facility]                

    

 

                CHEM PANEL                         eGFR            122          

                           

                    2019                                                  

Result 

Comment: The eGFR is calculated using the CKD-EPI formula. In most young, 
healthy individuals the eGFR will be >90 mL/min/1.73m2. The eGFR declines with 
age. An eGFR of 60-89 may be normal in some populations, particularly the 
elderly, for whom the CKD-EPI formula has not been extensively validated. Use of
 the eGFR is not recommended in the following populations:<br/><br/>Individuals 
with unstable creatinine concentrations, including pregnant patients and those 
with serious co-morbid conditions.<br/><br/>Patients with extremes in muscle 
mass or diet. <br/><br/>The data above are obtained from the National Kidney 
Disease Education Program (NKDEP) which additionally recommends that when the 
eGFR is used in patients with extremes of body mass index for purposes of drug 
dosing, the eGFR should be multiplied by the estimated BMI.                     
                                         [facility]                    

 

                    URINE AND STOOL                         UA Color            

Yellow 

*NA*

                    (19 7:35 PM)                         Yellow             

            

2019                                                                      

 

                                        [facility]                    

 

                    URINE AND STOOL                         UA Turbidity        

Clear 

                    (19 7:35 PM)                         Clear              

           

2019                                                                      

 

                                         [facility]                    

 

                    URINE AND STOOL                         UA Protein          

30 mg/dL                     

                    Negative mg/dL                         2019           

                

                                                     [facility]                

    

 

                    URINE AND STOOL                         UA Glucose          

Negative 

*NA*

                    (19 7:35 PM)                         Negative           

              

2019                                                                      

 

                                        [facility]                    

 

                URINE AND STOOL                         UA pH           6.0     

                    5.0 - 

8.0                         2019                                          

                                                     [facility]                

    

 

                    URINE AND STOOL                         UA Spec Grav        

1.018                      

                    <=1.030                         2019                  

                 

                                                    [facility]                

    

 

                    URINE AND STOOL                         UA Ketones          

Negative 

*NA*

                    (19 7:35 PM)                         Negative           

              

2019                                                                      

 

                                         [facility]                    

 

                    URINE AND STOOL                         UA Bili             

Negative 

*NA*

                    (19 7:35 PM)                         Negative           

              

2019                                                                      

 

                                        [facility]                    

 

                    URINE AND STOOL                         UA CaOx Alissa        

Few /HPF                   

                    None Seen /HPF                         2019           

              

                                                    [facility]                

  

  

 

                    URINE AND STOOL                         UA Blood            

Small 

*ABN*

                    (19 7:35 PM)                         Negative           

              

2019                                                                      

 

                                        [facility]                    

 

                    URINE AND STOOL                         UA Urobilinogen     

<=1.0 mg/dL             

                          0.1 - 1.0                         2019          

              

                                                                      [facility]                 

   

 

                    URINE AND STOOL                         UA Nitrite          

Positive 

*ABN*

                    (19 7:35 PM)                         Negative           

              

2019                                                                      

 

                                         [facility]                    

 

                    URINE AND STOOL                         UA Sq Epi           

Occasional /LPF               

                    Few /LPF                         2019                 

          

                                                     [facility]                

    

 

                URINE AND STOOL                         UA RBC          5       

                  0 - 2  

                          2019                                            

   

                                                    [facility]                

    

 

                    URINE AND STOOL                         UA Leuk Est         

Trace 

*ABN*

                    (19 7:35 PM)                         Negative           

              

2019                                                                      

 

                                        [facility]                    

 

                URINE AND STOOL                         UA WBC          6       

                  0 - 5  

                          2019                                            

   

                                                    [facility]                

    

 

                    URINE AND STOOL                         UA Bacteria         

Many /HPF                   

                    None Seen /HPF                         2019           

              

                                                    [facility]                

  

  

 

                CHEM PANEL                         Lipase Lvl      164          

               73 - 

393                         2019                                          

                                                    [facility]                

    

 

                ELECTROLYTES                         AGAP            11.7       

                  10.0 - 

20.0                         2019                                         

                                                    [facility]                

    

 

                ELECTROLYTES                         B/C Ratio       29         

                6 - 25

                          2019                                            

 

                                                    [facility]                

    

 

                ELECTROLYTES                         Globulin        4.1        

                 2.7 - 

4.2                         2019                                          

                                                    [facility]                

    

 

                ELECTROLYTES                         A/G Ratio       0.9        

                 0.7 -

 1.6                         2019                                         

                                                    [facility]                

    

 

                ELECTROLYTES                         eGFR            101        

                           

                    2019                                                  

Result Comment: The eGFR is calculated using the CKD-EPI formula. In most young,
 healthy individuals the eGFR will be >90 mL/min/1.73m2. The eGFR declines with 
age. An eGFR of 60-89 may be normal in some populations, particularly the 
elderly, for whom the CKD-EPI formula has not been extensively validated. Use of
 the eGFR is not recommended in the following populations:<br/><br/>Individuals 
with unstable creatinine concentrations, including pregnant patients and those 
with serious co-morbid conditions.<br/><br/>Patients with extremes in muscle 
mass or diet. <br/><br/>The data above are obtained from the National Kidney 
Disease Education Program (NKDEP) which additionally recommends that when the 
eGFR is used in patients with extremes of body mass index for purposes of drug 
dosing, the eGFR should be multiplied by the estimated BMI.                     
                                        [facility]                    

 

                ELECTROLYTES                         Alk Phos        94         

                39 - 

136                         2019                                          

                                                    [facility]                

    

 

                ELECTROLYTES                         Bili Total      0.3        

                 0.2 

- 1.3                         2019                                        

                                                    [facility]                

    

 

                ELECTROLYTES                         Sodium Lvl      144        

                 135 

- 145                         2019                                        

                                                    [facility]                

    

 

                ELECTROLYTES                         Potassium Lvl   3.7        

                 

3.5 - 5.1                         2019                                    

                                                    [facility]                

    

 

                ELECTROLYTES                         Chloride Lvl    112        

                 95

 - 109                         2019                                       

                                                    [facility]                

    

 

                ELECTROLYTES                         Calcium Lvl     9.2        

                 8.5

 - 10.5                         2019                                      

                                                    [facility]                

    

 

                ELECTROLYTES                         CO2             24         

                24 - 32     

                    2019                                                  

                                        [facility]                    

 

                ELECTROLYTES                         ALT             17         

                0 - 65      

                    2019                                                  

 

                                        [facility]                    

 

                ELECTROLYTES                         Albumin Lvl     3.7        

                 3.5

 - 5.0                         2019                                       

                                                    [facility]                

    

 

                ELECTROLYTES                         Total Protein   7.8        

                 

6.4 - 8.4                         2019                                    

                                                    [facility]                

    

 

                ELECTROLYTES                         AST             15         

                0 - 37      

                    2019                                                  

 

                                        [facility]                    

 

                ELECTROLYTES                         Glucose Lvl     120        

                 70 

- 99                         2019                                         

                                                    [facility]                

    

 

                ELECTROLYTES                         BUN             16         

                7 - 22      

                    2019                                                  

 

                                        [facility]                    

 

                    ELECTROLYTES                         Creatinine Lvl      0.5

5                        

                    0.50 - 1.40                         2019              

                   

                                                    [facility]                

    

 

                HEMATOLOGY                         MPV             8.2          

               7.4 - 10.4   

                          2019                                            

    

                                                    [facility]                

    

 

                HEMATOLOGY                         Hct             37.5         

                36.0 - 48.0 

                          2019                                            

  

                                                    [facility]                

    

 

                HEMATOLOGY                         MCV             94.3         

                80.0 - 98.0 

                          2019                                            

  

                                                    [facility]                         MCHC            34.0         

                32.0 - 36.0

                          2019                                            

 

                                                    [facility]                         MCH             32.1         

                27.0 - 31.0 

                          2019                                            

  

                                                    [facility]                

    

 

                HEMATOLOGY                         Platelet        348          

               133 - 

450                         2019                                          

                                                    [facility]                         RDW             13.8         

                11.5 - 14.5 

                          2019                                            

  

                                                    [facility]                         WBC             10.7         

                3.7 - 10.4  

                          2019                                            

   

                                                    [facility]                

    

 

                HEMATOLOGY                         RBC             3.97         

                4.20 - 5.40 

                          2019                                            

  

                                                    [facility]                

    

 

                HEMATOLOGY                         Hgb             12.7         

                12.0 - 16.0 

                          2019                                            

  

                                                    [facility]                

    

 

                HEMATOLOGY                         Monocytes #     0.8          

               0.0 -

 0.8                         2019                                         

                                                    [facility]                

    

 

                HEMATOLOGY                         Eosinophils #   0.1          

               0.0

 - 0.5                         2019                                       

                                                    [facility]                         Basophils #     0.1          

               0.0 -

 0.2                         2019                                         

                                                    [facility]                

    

 

                HEMATOLOGY                         Eosinophils     1.3          

               0.0 -

 4.0                         2019                                         

                                                    [facility]                         Basophils       0.6          

               0.0 - 

1.0                         2019                                          

                                                    [facility]                

    

 

                HEMATOLOGY                         Neutrophils #   7.4          

               1.5

 - 8.1                         2019                                       

                                                    [facility]                         Lymphocytes #   2.3          

               1.0

 - 5.5                         2019                                       

                                                    [facility]                

    

 

                HEMATOLOGY                         Segs            69.3         

                45.0 - 75.0

                          2019                                            

 

                                                    [facility]                         Lymphocytes     21.3         

                20.0

 - 40.0                         2019                                      

                                                    [facility]                         Monocytes       7.5          

               2.0 - 

12.0                         2019                                         

                                                    [facility]                

    

 

                    URINE AND STOOL                         UA Color            

Ltyellow                       

                                             09/10/2018                         

                  

                                                    [facility]                

    

 

                    URINE AND STOOL                         UA Spec Grav        

1.019                      

                    <=1.030                         09/10/2018                  

                 

                                                    [facility]                

    

 

                URINE AND STOOL                         UA pH           5.0     

                    5.0 - 

8.0                         09/10/2018                                          

                                                    [facility]                

    

 

                    URINE AND STOOL                         UA Protein          

Negative mg/dL               

                          Negative mg/dL                         09/10/2018     

                

                                                                      [facility]              

      

 

                    URINE AND STOOL                         UA Turbidity        

Clear 

                    (9/10/18 4:54 PM)                         Clear             

            

09/10/2018                                                                      

 

                                        [facility]                    

 

                    URINE AND STOOL                         UA Leuk Est         

Negative 

                    (9/10/18 4:54 PM)                         Negative          

               

09/10/2018                                                                      

 

                                        [facility]                    

 

                    URINE AND STOOL                         UA Sq Epi           

Occasional /LPF               

                    Few /LPF                         09/10/2018                 

          

                                                    [facility]                

    

 

                    URINE AND STOOL                         UA Nitrite          

Negative 

                    (9/10/18 4:54 PM)                         Negative          

               

09/10/2018                                                                      

 

                                        [facility]                    

 

                URINE AND STOOL                         UA WBC          1       

                  0 - 5  

                          09/10/2018                                            

   

                                                    [facility]                

    

 

                URINE AND STOOL                         UA RBC          6       

                  0 - 2  

                          09/10/2018                                            

   

                                                    [facility]                

    

 

                    URINE AND STOOL                         UA Glucose          

Negative mg/dL               

                          Negative mg/dL                         09/10/2018     

                

                                                                      [facility]              

      

 

                    URINE AND STOOL                         UA Ketones          

Negative mg/dL               

                          Negative mg/dL                         09/10/2018     

                

                                                                      [facility]              

      

 

                    URINE AND STOOL                         UA Urobilinogen     

2.0                     

                    0.1 - 1.0                         09/10/2018                

                

                                                    [facility]                

    

 

                    URINE AND STOOL                         UA Bili             

Negative 

*NA*

                    (9/10/18 4:54 PM)                         Negative          

               

09/10/2018                                                                      

 

                                        [facility]                    

 

                    URINE AND STOOL                         UA Blood            

Negative 

                    (9/10/18 4:54 PM)                         Negative          

               

09/10/2018                                                                      

 

                                        [facility]                    

 

                CHEM PANEL                         eGFR            101          

                           

                    09/10/2018                                                  

Result 

Comment: The eGFR is calculated using the CKD-EPI formula. In most young, 
healthy individuals the eGFR will be >90 mL/min/1.73m2. The eGFR declines with 
age. An eGFR of 60-89 may be normal in some populations, particularly the 
elderly, for whom the CKD-EPI formula has not been extensively validated. Use of
 the eGFR is not recommended in the following populations:<br/><br/>Individuals 
with unstable creatinine concentrations, including pregnant patients and those 
with serious co-morbid conditions.<br/><br/>Patients with extremes in muscle 
mass or diet. <br/><br/>The data above are obtained from the National Kid[employer] 
Disease Education Program (NKDEP) which additionally recommends that when the 
eGFR is used in patients with extremes of body mass index for purposes of drug 
dosing, the eGFR should be multiplied by the estimated BMI.                     
                                        [facility]                    

 

                CHEM PANEL                         Creatinine Lvl  0.54         

                

0.50 - 1.40                         09/10/2018                                  

                                                    [facility]                

    

 

                CHEM PANEL                         Sodium Lvl      146          

               135 - 

145                         09/10/2018                                          

                                                    [facility]                

    

 

                CHEM PANEL                         Potassium Lvl   4.2          

               3.5

 - 5.1                         09/10/2018                                       

                                                    [facility]                

    

 

                CHEM PANEL                         Chloride Lvl    113          

               95 -

 109                         09/10/2018                                         

                                                    [facility]                

    

 

                CHEM PANEL                         Glucose Lvl     119          

               70 - 

99                         09/10/2018                                           

                                                    [facility]                

    

 

                CHEM PANEL                         BUN             14           

              7 - 22        

                    09/10/2018                                                  

   

                                        [facility]                    

 

                CHEM PANEL                         Calcium Lvl     9.1          

               8.5 -

 10.5                         09/10/2018                                        

                                                    [facility]                

    

 

                CHEM PANEL                         CO2             24           

              24 - 32       

                    09/10/2018                                                  

  

                                        [facility]                    

 

                CHEM PANEL                         AGAP            13.2         

                10.0 - 20.0

                          09/10/2018                                            

 

                                                    [facility]                

    

 

                HEMATOLOGY                         Basophils #     0.1          

               0.0 -

 0.2                         09/10/2018                                         

                                                    [facility]                

    

 

                HEMATOLOGY                         Segs            55.3         

                45.0 - 75.0

                          09/10/2018                                            

 

                                                    [facility]                

    

 

                HEMATOLOGY                         Monocytes       7.0          

               2.0 - 

12.0                         09/10/2018                                         

                                                    [facility]                

    

 

                HEMATOLOGY                         Lymphocytes     32.0         

                20.0

 - 40.0                         09/10/2018                                      

                                                    [facility]                

    

 

                HEMATOLOGY                         Eosinophils     3.5          

               0.0 -

 4.0                         09/10/2018                                         

                                                    [facility]                

    

 

                HEMATOLOGY                         Basophils       2.2          

               0.0 - 

1.0                         09/10/2018                                          

                                                    [facility]                

    

 

                HEMATOLOGY                         Lymphocytes #   2.0          

               1.0

 - 5.5                         09/10/2018                                       

                                                    [facility]                

    

 

                HEMATOLOGY                         Neutrophils #   3.4          

               1.5

 - 8.1                         09/10/2018                                       

                                                    [facility]                

    

 

                HEMATOLOGY                         Monocytes #     0.4          

               0.0 -

 0.8                         09/10/2018                                         

                                                    [facility]                

    

 

                HEMATOLOGY                         Eosinophils #   0.2          

               0.0

 - 0.5                         09/10/2018                                       

                                                    [facility]                

    

 

                HEMATOLOGY                         MPV             8.0          

               7.4 - 10.4   

                          09/10/2018                                            

    

                                                    [facility]                

    

 

                HEMATOLOGY                         Platelet        277          

               133 - 

450                         09/10/2018                                          

                                                    [facility]                

    

 

                HEMATOLOGY                         MCHC            33.8         

                32.0 - 36.0

                          09/10/2018                                            

 

                                                    [facility]                

    

 

                HEMATOLOGY                         RDW             13.9         

                11.5 - 14.5 

                          09/10/2018                                            

  

                                                    [facility]                

    

 

                HEMATOLOGY                         Hgb             12.5         

                12.0 - 16.0 

                          09/10/2018                                            

  

                                                    [facility]                

    

 

                HEMATOLOGY                         Hct             37.1         

                36.0 - 48.0 

                          09/10/2018                                            

  

                                                    [facility]                

    

 

                HEMATOLOGY                         MCH             32.3         

                27.0 - 31.0 

                          09/10/2018                                            

  

                                                    [facility]                

    

 

                HEMATOLOGY                         MCV             95.7         

                80.0 - 98.0 

                          09/10/2018                                            

  

                                                     [facility]                

    

 

                HEMATOLOGY                         RBC             3.87         

                4.20 - 5.40 

                          09/10/2018                                            

  

                                                     [facility]                

    

 

                HEMATOLOGY                         WBC             6.1          

               3.7 - 10.4   

                          09/10/2018                                            

    

                                                     [facility]                

    

 

                HEMATOLOGY                         MPV             8.5          

               7.4 - 10.4   

                          12/10/2017                                            

    

                                                    [facility]                

    

 

                HEMATOLOGY                         Platelet        181          

               133 - 

450                         12/10/2017                                          

                                                     [facility]                

    

 

                HEMATOLOGY                         Hct             31.4         

                36.0 - 48.0 

                          12/10/2017                                            

  

                                                    [facility]                

    

 

                HEMATOLOGY                         MCV             93.4         

                80.0 - 98.0 

                          12/10/2017                                            

  

                                                    [facility]                

    

 

                HEMATOLOGY                         MCH             31.4         

                27.0 - 31.0 

                          12/10/2017                                            

  

                                                    [facility]                

    

 

                HEMATOLOGY                         MCHC            33.6         

                32.0 - 36.0

                          12/10/2017                                            

 

                                                    [facility]                

    

 

                HEMATOLOGY                         RDW             13.9         

                11.5 - 14.5 

                          12/10/2017                                            

  

                                                     [facility]                

    

 

                HEMATOLOGY                         RBC             3.36         

                4.20 - 5.40 

                          12/10/2017                                            

  

                                                    [facility]                

    

 

                HEMATOLOGY                         Hgb             10.6         

                12.0 - 16.0 

                          12/10/2017                                            

  

                                                     [facility]                

    

 

                HEMATOLOGY                         WBC             5.8          

               3.7 - 10.4   

                          12/10/2017                                            

    

                                                     [facility]                

    

 

                HEMATOLOGY                         Basophils #     0.1          

               0.0 -

 0.2                         12/10/2017                                         

                                                     [facility]                

    

 

                HEMATOLOGY                         Lymphocytes #   1.8          

               1.0

 - 5.5                         12/10/2017                                       

                                                     [facility]                

    

 

                HEMATOLOGY                         Segs-Bands #    3.2          

               1.5 

- 8.1                         12/10/2017                                        

                                                     [facility]                

    

 

                HEMATOLOGY                         Basophils       1.2          

               0.0 - 

1.0                         12/10/2017                                          

                                                     [facility]                

    

 

                HEMATOLOGY                         Segs            55.4         

                45.0 - 75.0

                          12/10/2017                                            

 

                                                     [facility]                

    

 

                HEMATOLOGY                         Lymphocytes     30.8         

                20.0

 - 40.0                         12/10/2017                                      

                                                     [facility]                

    

 

                HEMATOLOGY                         Monocytes       9.5          

               2.0 - 

12.0                         12/10/2017                                         

                                                     [facility]                

    

 

                HEMATOLOGY                         Eosinophils     3.1          

               0.0 -

 4.0                         12/10/2017                                         

                                                     [facility]                

    

 

                HEMATOLOGY                         Eosinophils #   0.2          

               0.0

 - 0.5                         12/10/2017                                       

                                                     [facility]                

    

 

                HEMATOLOGY                         Monocytes #     0.6          

               0.0 -

 0.8                         12/10/2017                                         

                                                     [facility]                

    

 

                HEMATOLOGY                         Segs            67.8         

                45.0 - 75.0

                          2017                                            

 

                                                     [facility]                

    

 

                HEMATOLOGY                         Monocytes       9.9          

               2.0 - 

12.0                         2017                                         

                                                     [facility]                

    

 

                HEMATOLOGY                         Lymphocytes     20.3         

                20.0

 - 40.0                         2017                                      

                                                     [facility]                

    

 

                HEMATOLOGY                         Basophils       0.6          

               0.0 - 

1.0                         2017                                          

                                                    [facility]                

    

 

                HEMATOLOGY                         Eosinophils     1.4          

               0.0 -

 4.0                         2017                                         

                                                    [facility]                

    

 

                HEMATOLOGY                         Lymphocytes #   1.7          

               1.0

 - 5.5                         2017                                       

                                                    [facility]                

    

 

                HEMATOLOGY                         Segs-Bands #    5.8          

               1.5 

- 8.1                         2017                                        

                                                    [facility]                

    

 

                HEMATOLOGY                         Monocytes #     0.9          

               0.0 -

 0.8                         2017                                         

                                                    [facility]                

    

 

                HEMATOLOGY                         Eosinophils #   0.1          

               0.0

 - 0.5                         2017                                       

                                                    [facility]                

    

 

                HEMATOLOGY                         MCH             32.9         

                27.0 - 31.0 

                          2017                                            

  

                                                    [facility]                

    

 

                HEMATOLOGY                         MCV             93.7         

                80.0 - 98.0 

                          2017                                            

  

                                                    [facility]                

    

 

                HEMATOLOGY                         MCHC            35.1         

                32.0 - 36.0

                          2017                                            

 

                                                    [facility]                

    

 

                HEMATOLOGY                         MPV             8.4          

               7.4 - 10.4   

                          2017                                            

    

                                                    [facility]                

    

 

                HEMATOLOGY                         RDW             13.7         

                11.5 - 14.5 

                          2017                                            

  

                                                    [facility]                

    

 

                HEMATOLOGY                         Platelet        175          

               133 - 

450                         2017                                          

                                                    [facility]                

    

 

                HEMATOLOGY                         WBC             8.6          

               3.7 - 10.4   

                          2017                                            

    

                                                    [facility]                

    

 

                HEMATOLOGY                         Hgb             11.3         

                12.0 - 16.0 

                          2017                                            

  

                                                    [facility]                

    

 

                HEMATOLOGY                         RBC             3.44         

                4.20 - 5.40 

                          2017                                            

  

                                                    [facility]                

    

 

                HEMATOLOGY                         Hct             32.3         

                36.0 - 48.0 

                          2017                                            

  

                                                    [facility]                

    

 

                    URINE AND STOOL                         Occult Bld Stl      

Positive 

*ABN*

                    (17 10:35 PM)                         Negative         

                

2017                                                                      

 

                                        [facility]                    

 

                CARDIAC ENZYMES                         CK MB Index     0.8     

                    

0.0 - 2.5                         2017                                    

                                                    [facility]                

    

 

                CARDIAC ENZYMES                         BNP             25      

                   <=100 

pg/mL                         2017                                        

                                                    [facility]                

    

 

                CARDIAC ENZYMES                         Total CK        310     

                    12 

- 191                         2017                                        

                                                    [facility]                

    

 

                CARDIAC ENZYMES                         CK MB           2.4     

                    0.5 - 

3.6                         2017                                          

                                                    [facility]                

    

 

                    CARDIAC ENZYMES                         Troponin-I          

<0.02                        

                    0.00 - 0.40                         2017              

                   

                                                    [facility]                

    

 

                CHEM PANEL                         A/G Ratio       0.9          

               0.7 - 

1.6                         2017                                          

                                                    [facility]                

    

 

                CHEM PANEL                         Total Protein   7.6          

               6.4

 - 8.4                         2017                                       

                                                    [facility]                

    

 

                CHEM PANEL                         Albumin Lvl     3.5          

               3.5 -

 5.0                         2017                                         

                                                    [facility]                

    

 

                CHEM PANEL                         Globulin        4.1          

               2.7 - 

4.2                         2017                                          

                                                    [facility]                

    

 

                CHEM PANEL                         B/C Ratio       40           

              6 - 25  

                          2017                                            

   

                                                    [facility]                

    

 

                CHEM PANEL                         Calcium Lvl     8.9          

               8.5 -

 10.5                         2017                                        

                                                    [facility]                

    

 

                CHEM PANEL                         AGAP            15.8         

                10.0 - 20.0

                          2017                                            

 

                                                    [facility]                

    

 

                CHEM PANEL                         Chloride Lvl    102          

               95 -

 109                         2017                                         

                                                    [facility]                

    

 

                CHEM PANEL                         Potassium Lvl   3.8          

               3.5

 - 5.1                         2017                                       

                                                    [facility]                

    

 

                CHEM PANEL                         CO2             24           

              24 - 32       

                    2017                                                  

  

                                        [facility]                    

 

                CHEM PANEL                         AST             38           

              0 - 37        

                    2017                                                  

   

                                        [facility]                    

 

                CHEM PANEL                         ALT             37           

              0 - 65        

                    2017                                                  

   

                                        [facility]                    

 

                CHEM PANEL                         Alk Phos        64           

              39 - 136 

                          2017                                            

  

                                                    [facility]                

    

 

                CHEM PANEL                         Bili Total      0.6          

               0.2 - 

1.3                         2017                                          

                                                    [facility]                

    

 

                CHEM PANEL                         eGFR            102          

                           

                    2017                                                  

Result 

Comment: The eGFR is calculated using the CKD-EPI formula. In most young, 
healthy individuals the eGFR will be >90 mL/min/1.73m2. The eGFR declines with 
age. An eGFR of 60-89 may be normal in some populations, particularly the 
elderly, for whom the CKD-EPI formula has not been extensively validated. Use of
 the eGFR is not recommended in the following populations:<br/><br/>Individuals 
with unstable creatinine concentrations, including pregnant patients and those 
with serious co-morbid conditions.<br/><br/>Patients with extremes in muscle 
mass or diet. <br/><br/>The data above are obtained from the National Kid[employer] 
Disease Education Program (NKDEP) which additionally recommends that when the 
eGFR is used in patients with extremes of body mass index for purposes of drug 
dosing, the eGFR should be multiplied by the estimated BMI.                     
                                        [facility]                    

 

                CHEM PANEL                         Glucose Lvl     160          

               70 - 

99                         2017                                           

                                                    [facility]                

    

 

                CHEM PANEL                         BUN             22           

              7 - 22        

                    2017                                                  

   

                                        [facility]                    

 

                CHEM PANEL                         Creatinine Lvl  0.55         

                

0.50 - 1.40                         2017                                  

                                                    [facility]                

    

 

                CHEM PANEL                         Sodium Lvl      138          

               135 - 

145                         2017                                          

                                                    [facility]                

    

 

                HEMATOLOGY                         Monocytes #     0.8          

               0.0 -

 0.8                         2017                                         

                                                    [facility]                

    

 

                HEMATOLOGY                         Monocytes       6.2          

               2.0 - 

12.0                         2017                                         

                                                    [facility]                

    

 

                HEMATOLOGY                         Eosinophils     0.2          

               0.0 -

 4.0                         2017                                         

                                                    [facility]                         Lymphocytes     9.3          

               20.0 

- 40.0                         2017                                       

                                                    [facility]                

    

 

                HEMATOLOGY                         Basophils       0.3          

               0.0 - 

1.0                         2017                                          

                                                    [facility]                         Lymphocytes #   1.2          

               1.0

 - 5.5                         2017                                       

                                                    [facility]                         Segs-Bands #    11.0         

                1.5

 - 8.1                         2017                                       

                                                    [facility]                         Segs            84.0         

                45.0 - 75.0

                          2017                                            

 

                                                    [facility]                         Platelet        207          

               133 - 

450                         2017                                          

                                                    [facility]                         MPV             8.6          

               7.4 - 10.4   

                          2017                                            

    

                                                    [facility]                         RBC             4.01         

                4.20 - 5.40 

                          2017                                            

  

                                                    [facility]                         WBC             13.0         

                3.7 - 10.4  

                          2017                                            

   

                                                    [facility]                         RDW             13.8         

                11.5 - 14.5 

                          2017                                            

  

                                                    [facility]                         MCHC            34.2         

                32.0 - 36.0

                          2017                                            

 

                                                    [facility]                         Hgb             12.8         

                12.0 - 16.0 

                          2017                                            

  

                                                    [facility]                         MCH             32.0         

                27.0 - 31.0 

                          2017                                            

  

                                                    [facility]                         MCV             93.6         

                80.0 - 98.0 

                          2017                                            

  

                                                    [facility]                         Hct             37.5         

                36.0 - 48.0 

                          2017                                            

  

                                                    [facility]                

    

 

                CHEM PANEL                         Phosphorus      2.0          

               2.5 - 

4.5                         2017                                          

                                                    [facility]                

    

 

                CHEM PANEL                         Magnesium Lvl   1.6          

               1.8

 - 2.4                         2017                                       

                                                    [facility]                

    

 

                ELECTROLYTES                         CO2             20         

                24 - 32     

                    2017                                                  

                                        [facility]                    

 

                ELECTROLYTES                         AGAP            14.5       

                  10.0 - 

20.0                         2017                                         

                                                    [facility]                

    

 

                ELECTROLYTES                         Calcium Lvl     7.4        

                 8.5

 - 10.5                         2017                                      

                                                    [facility]                

    

 

                ELECTROLYTES                         eGFR            112        

                           

                    2017                                                  

Result Comment: The eGFR is calculated using the CKD-EPI formula. In most young,
 healthy individuals the eGFR will be >90 mL/min/1.73m2. The eGFR declines with 
age. An eGFR of 60-89 may be normal in some populations, particularly the 
elderly, for whom the CKD-EPI formula has not been extensively validated. Use of
 the eGFR is not recommended in the following populations:<br/><br/>Individuals 
with unstable creatinine concentrations, including pregnant patients and those 
with serious co-morbid conditions.<br/><br/>Patients with extremes in muscle 
mass or diet. <br/><br/>The data above are obtained from the National Kid[employer] 
Disease Education Program (NKDEP) which additionally recommends that when the 
eGFR is used in patients with extremes of body mass index for purposes of drug 
dosing, the eGFR should be multiplied by the estimated BMI.                     
                                        [facility]                    

 

                ELECTROLYTES                         Potassium Lvl   3.5        

                 

3.5 - 5.1                         2017                                    

                                                    [facility]                

    

 

                ELECTROLYTES                         Chloride Lvl    112        

                 95

 - 109                         2017                                       

                                                    [facility]                

    

 

                ELECTROLYTES                         Sodium Lvl      143        

                 135 

- 145                         2017                                        

                                                    [facility]                

    

 

                ELECTROLYTES                         BUN             19         

                7 - 22      

                    2017                                                  

 

                                        [facility]                    

 

                    ELECTROLYTES                         Creatinine Lvl      0.4

2                        

                    0.50 - 1.40                         2017              

                   

                                                    [facility]                

    

 

                ELECTROLYTES                         Glucose Lvl     102        

                 70 

- 99                         2017                                         

                                                    [facility]                         Basophils       0.3          

               0.0 - 

1.0                         2017                                          

                                                    [facility]                         Segs-Bands #    5.3          

               1.5 

- 8.1                         2017                                        

                                                    [facility]                         Lymphocytes #   2.7          

               1.0

 - 5.5                         2017                                       

                                                    [facility]                         Monocytes #     0.9          

               0.0 -

 0.8                         2017                                         

                                                    [facility]                         Eosinophils #   0.2          

               0.0

 - 0.5                         2017                                       

                                                    [facility]                         Segs            58.2         

                45.0 - 75.0

                          2017                                            

 

                                                    [facility]                         Lymphocytes     30.0         

                20.0

 - 40.0                         2017                                      

                                                    [facility]                         Monocytes       9.5          

               2.0 - 

12.0                         2017                                         

                                                    [facility]                         Eosinophils     2.0          

               0.0 -

 4.0                         2017                                         

                                                    [facility]                         MCH             32.3         

                27.0 - 31.0 

                          2017                                            

  

                                                    [facility]                         Hct             32.6         

                36.0 - 48.0 

                          2017                                            

  

                                                    [facility]                         MCV             91.9         

                80.0 - 98.0 

                          2017                                            

  

                                                    [facility]                         RBC             3.54         

                4.20 - 5.40 

                          2017                                            

  

                                                    [facility]                         Hgb             11.5         

                12.0 - 16.0 

                          2017                                            

  

                                                    [facility]                         MPV             8.1          

               7.4 - 10.4   

                          2017                                            

    

                                                    [facility] [facility]                         Platelet        232          

               133 - 

450                         2017                                          

                                                    [facility]                

    

 

                HEMATOLOGY                         RDW             13.2         

                11.5 - 14.5 

                          2017                                            

  

                                                    [facility]                

    

 

                HEMATOLOGY                         MCHC            35.2         

                32.0 - 36.0

                          2017                                            

 

                                                    [facility]                

    

 

                HEMATOLOGY                         WBC             9.0          

               3.7 - 10.4   

                          2017                                            

    

                                                    [facility]                

    

 

                CHEM PANEL                         Lactic Acid Lvl 1.7          

               

0.5 - 2.2                         2017                                    

                                                    [facility]                

    

 

                CHEM PANEL                         Lactic Acid Lvl 2.8          

               

0.5 - 2.2                         2017                                    

                                                    [facility]                

    

 

                    URINE AND STOOL                         UA Urobilinogen     

<=1.0 mg/dL             

                          0.1 - 1.0                         2017          

              

                                                                      [facility]                 

   

 

                URINE AND STOOL                         UA RBC          4       

                  0 - 2  

                          2017                                            

   

                                                    [facility]                

    

 

                    URINE AND STOOL                         UA Mucus            

Few /LPF                       

                    None Seen /LPF                         2017           

                  

                                                    [facility]                

    

 

                    URINE AND STOOL                         UA Turbidity        

Slight 

*ABN*

                    (17 11:08 PM)                         Clear             

            

2017                                                                      

 

                                        [facility]                    

 

                    URINE AND STOOL                         UA Spec Grav        

1.023                      

                    <=1.030                         2017                  

                 

                                                    [facility]                

    

 

                    URINE AND STOOL                         UA Glucose          

Negative mg/dL               

                          Negative mg/dL                         2017     

                

                                                                      [facility]              

      

 

                    URINE AND STOOL                         UA Bili             

Negative 

*NA*

                    (17 11:08 PM)                         Negative          

               

2017                                                                      

 

                                        [facility]                    

 

                    URINE AND STOOL                         UA Ketones          

Negative mg/dL               

                          Negative mg/dL                         2017     

                

                                                                      [facility]              

      

 

                    URINE AND STOOL                         UA Sq Epi           

Occasional /LPF               

                    Few /LPF                         2017                 

          

                                                    [facility]                

    

 

                    URINE AND STOOL                         UA Leuk Est         

Negative 

                    (17 11:08 PM)                         Negative          

               

2017                                                                      

 

                                        [facility]                    

 

                URINE AND STOOL                         UA WBC          <1      

                   0 - 5 

                          2017                                            

  

                                                    [facility]                

    

 

                URINE AND STOOL                         UA pH           5.0     

                    5.0 - 

8.0                         2017                                          

                                                    [facility]                

    

 

                    URINE AND STOOL                         UA Protein          

Negative mg/dL               

                          Negative mg/dL                         2017     

                

                                                                      [facility]              

      

 

                    URINE AND STOOL                         UA Blood            

Moderate 

*ABN*

                    (17 11:08 PM)                         Negative          

               

2017                                                                      

 

                                        [facility]                    

 

                    URINE AND STOOL                         UA Nitrite          

Negative 

                    (17 11:08 PM)                         Negative          

               

2017                                                                      

 

                                        [facility]                    

 

                    URINE AND STOOL                         UA Color            

Yellow 

*NA*

                    (17 11:08 PM)                         Yellow            

             

2017                                                                      

 

                                        [facility]                    

 

                    CARDIAC ENZYMES                         Troponin-I          

<0.02                        

                    0.00 - 0.40                         2017              

                   

                                                     [facility]                

    

 

                CHEM PANEL                         Magnesium Lvl   1.6          

               1.8

 - 2.4                         2017                                       

                                                    [facility]                

    

 

                CHEM PANEL                         eGFR            97           

                           

                    2017                                                  

Result 

Comment: The eGFR is calculated using the CKD-EPI formula. In most young, 
healthy individuals the eGFR will be >90 mL/min/1.73m2. The eGFR declines with 
age. An eGFR of 60-89 may be normal in some populations, particularly the 
elderly, for whom the CKD-EPI formula has not been extensively validated. Use of
 the eGFR is not recommended in the following populations:<br/><br/>Individuals 
with unstable creatinine concentrations, including pregnant patients and those 
with serious co-morbid conditions.<br/><br/>Patients with extremes in muscle 
mass or diet. <br/><br/>The data above are obtained from the National Kid[employer] 
Disease Education Program (NKDEP) which additionally recommends that when the 
eGFR is used in patients with extremes of body mass index for purposes of drug 
dosing, the eGFR should be multiplied by the estimated BMI.                     
                                         [facility]                    

 

                CHEM PANEL                         Bili Total      0.4          

               0.2 - 

1.3                         2017                                          

                                                     [facility]                

    

 

                CHEM PANEL                         Alk Phos        59           

              39 - 136 

                          2017                                            

  

                                                    [facility]                

    

 

                CHEM PANEL                         AST             27           

              0 - 37        

                    2017                                                  

   

                                        [facility]                    

 

                CHEM PANEL                         Total Protein   7.5          

               6.4

 - 8.4                         2017                                       

                                                    [facility]                

    

 

                CHEM PANEL                         Calcium Lvl     8.3          

               8.5 -

 10.5                         2017                                        

                                                     [facility]                

    

 

                CHEM PANEL                         CO2             19           

              24 - 32       

                    2017                                                  

  

                                         [facility]                    

 

                CHEM PANEL                         ALT             30           

              0 - 65        

                    2017                                                  

   

                                        [facility]                    

 

                CHEM PANEL                         Albumin Lvl     3.6          

               3.5 -

 5.0                         2017                                         

                                                     [facility]                

    

 

                CHEM PANEL                         Sodium Lvl      140          

               135 - 

145                         2017                                          

                                                     [facility]                

    

 

                CHEM PANEL                         Creatinine Lvl  0.64         

                

0.50 - 1.40                         2017                                  

                                                     [facility]                

    

 

                CHEM PANEL                         BUN             25           

              7 - 22        

                    2017                                                  

   

                                         [facility]                    

 

                CHEM PANEL                         Chloride Lvl    108          

               95 -

 109                         2017                                         

                                                     [facility]                

    

 

                CHEM PANEL                         Potassium Lvl   3.7          

               3.5

 - 5.1                         2017                                       

                                                     [facility]                

    

 

                CHEM PANEL                         Glucose Lvl     147          

               70 - 

99                         2017                                           

                                                     [facility]                

    

 

                CHEM PANEL                         A/G Ratio       0.9          

               0.7 - 

1.6                         2017                                          

                                                    [facility]                

    

 

                CHEM PANEL                         Globulin        3.9          

               2.7 - 

4.2                         2017                                          

                                                    [facility]                

    

 

                CHEM PANEL                         B/C Ratio       39           

              6 - 25  

                          2017                                            

   

                                                    [facility]                

    

 

                CHEM PANEL                         AGAP            16.7         

                10.0 - 20.0

                          2017                                            

 

                                                    [facility]                

    

 

                CHEM PANEL                         Lipase Lvl      191          

               73 - 

393                         2017                                          

                                                    [facility]                         RBC             4.71         

                4.20 - 5.40 

                          2017                                            

  

                                                    [facility]                         WBC             18.6         

                3.7 - 10.4  

                          2017                                            

   

                                                    [facility]                         MPV             8.4          

               7.4 - 10.4   

                          2017                                            

    

                                                    [facility]                         Platelet        227          

               133 - 

450                         2017                                          

                                                    [facility]                         MCHC            33.7         

                32.0 - 36.0

                          2017                                            

 

                                                    [facility]                         RDW             13.6         

                11.5 - 14.5 

                          2017                                            

  

                                                    [facility]                         MCV             93.2         

                80.0 - 98.0 

                          2017                                            

  

                                                    [facility]                         MCH             31.4         

                27.0 - 31.0 

                          2017                                            

  

                                                    [facility]                         Hct             43.9         

                36.0 - 48.0 

                          2017                                            

  

                                                    [facility]                         Hgb             14.8         

                12.0 - 16.0 

                          2017                                            

  

                                                    [facility]                         Plt Morph           Yue

l 

                    (17 8:17 PM)                                            

      2017    

                                                                       [facility]                         Atypical Lymphs 4.0          

               

<=0.0 %                         2017                                      

                                                    [facility]                         Bands           6.0          

               0.0 - 11.0 

                          2017                                            

  

                                                    [facility]                         RBC Morph           Yue

l 

                    (17 8:17 PM)                                            

      2017    

                                                                       [facility]                         Eosinophils     1.0          

               0.0 -

 4.0                         2017                                         

                                                    [facility]                         Segs-Bands #    15.8         

                1.5

 - 8.1                         2017                                       

                                                    [facility]                         Lymphocytes     3.0          

               20.0 

- 40.0                         2017                                       

                                                    [facility]                         Metamyelocytes  1.0          

               

0.0 - 1.0                         2017                                    

                                                    [facility]                         Monocytes       6.0          

               2.0 - 

12.0                         2017                                         

                                                    [facility]                         Segs            79.0         

                45.0 - 75.0

                          2017                                            

 

                                                    [facility]                         Eosinophils #   0.2          

               0.0

 - 0.5                         2017                                       

                                                    [facility]                         Monocytes #     1.1          

               0.0 -

 0.8                         2017                                         

                                                    [facility]                

    

 

                HEMATOLOGY                         Lymphocytes #   1.3          

               1.0

 - 5.5                         2017                                       

                                                    [facility]                

    

 

                    URINE AND STOOL                         Occult Bld Stl      

Negative 

                    (16 7:08 PM)                         Negative          

               

2016                                                                      

 

                                        [facility]                    

 

                    CARDIAC ENZYMES                         Troponin-I          

<0.02                        

                    0.00 - 0.40                         2016              

                   

                                                    [facility]                

    

 

                CHEM PANEL                         Lipase Lvl      156          

               73 - 

393                         2016                                          

                                                    [facility]                

    

 

                CHEM PANEL                         Amylase Lvl     86           

              25 - 

115                         2016                                          

                                                    [facility]                

    

 

                CHEM PANEL                         A/G Ratio       1.0          

               0.7 - 

1.6                         2016                                          

                                                    [facility]                

    

 

                CHEM PANEL                         B/C Ratio       18           

              6 - 25  

                          2016                                            

   

                                                    [facility]                

    

 

                CHEM PANEL                         Globulin        4.3          

               2.7 - 

4.2                         2016                                          

                                                    [facility]                

    

 

                CHEM PANEL                         AGAP            13.4         

                10.0 - 20.0

                          2016                                            

 

                                                    [facility]                

    

 

                CHEM PANEL                         Alk Phos        64           

              39 - 136 

                          2016                                            

  

                                                    [facility]                

    

 

                CHEM PANEL                         AST             38           

              0 - 37        

                    2016                                                  

   

                                        [facility]                    

 

                CHEM PANEL                         ALT             38           

              0 - 65        

                    2016                                                  

   

                                        [facility]                    

 

                CHEM PANEL                         Albumin Lvl     4.4          

               3.5 -

 5.0                         2016                                         

                                                    [facility]                

    

 

                CHEM PANEL                         Total Protein   8.7          

               6.4

 - 8.4                         2016                                       

                                                    [facility]                

    

 

                CHEM PANEL                         CO2             24           

              24 - 32       

                    2016                                                  

  

                                        [facility]                    

 

                CHEM PANEL                         Calcium Lvl     10.4         

                8.5 

- 10.5                         2016                                       

                                                    [facility]                

    

 

                CHEM PANEL                         Chloride Lvl    104          

               95 -

 109                         2016                                         

                                                    [facility]                

    

 

                CHEM PANEL                         eGFR            89           

                           

                    2016                                                  

Result 

Comment: The eGFR is calculated using the CKD-EPI formula. In most young, 
healthy individuals the eGFR will be >90 mL/min/1.73m2. The eGFR declines with 
age. An eGFR of 60-89 may be normal in some populations, particularly the 
elderly, for whom the CKD-EPI formula has not been extensively validated. Use of
 the eGFR is not recommended in the following populations:<br/><br/>Individuals 
with unstable creatinine concentrations, including pregnant patients and those 
with serious co-morbid conditions.<br/><br/>Patients with extremes in muscle 
mass or diet. <br/><br/>The data above are obtained from the National Kid[employer] 
Disease Education Program (NKDEP) which additionally recommends that when the 
eGFR is used in patients with extremes of body mass index for purposes of drug 
dosing, the eGFR should be multiplied by the estimated BMI.                     
                                        [facility]                    

 

                CHEM PANEL                         Potassium Lvl   3.4          

               3.5

 - 5.1                         2016                                       

                                                    [facility]                

    

 

                CHEM PANEL                         Sodium Lvl      138          

               135 - 

145                         2016                                          

                                                    [facility]                

    

 

                CHEM PANEL                         Creatinine Lvl  0.73         

                

0.50 - 1.40                         2016                                  

                                                    [facility]                

    

 

                CHEM PANEL                         BUN             13           

              7 - 22        

                    2016                                                  

   

                                        [facility]                    

 

                CHEM PANEL                         Glucose Lvl     149          

               70 - 

99                         2016                                           

                                                    [facility]                

    

 

                CHEM PANEL                         Bili Total      0.5          

               0.2 - 

1.3                         2016                                          

                                                    [facility]                

    

 

                HEMATOLOGY                         Segs-Bands #    5.2          

               1.5 

- 8.1                         2016                                        

                                                    [facility]                

    

 

                HEMATOLOGY                         Lymphocytes #   2.7          

               1.0

 - 5.5                         2016                                       

                                                    [facility]                

    

 

                HEMATOLOGY                         Eosinophils     0.8          

               0.0 -

 4.0                         2016                                         

                                                    [facility]                

    

 

                HEMATOLOGY                         Basophils       1.1          

               0.0 - 

1.0                         2016                                          

                                                    [facility]                

    

 

                HEMATOLOGY                         Basophils #     0.1          

               0.0 -

 0.2                         2016                                         

                                                    [facility]                

    

 

                HEMATOLOGY                         Segs            58.0         

                45.0 - 75.0

                          2016                                            

 

                                                    [facility]                

    

 

                HEMATOLOGY                         Monocytes       9.5          

               2.0 - 

12.0                         2016                                         

                                                    [facility]                         Lymphocytes     30.6         

                20.0

 - 40.0                         2016                                      

                                                    [facility]                

    

 

                HEMATOLOGY                         Eosinophils #   0.1          

               0.0

 - 0.5                         2016                                       

                                                    [facility]                

    

 

                HEMATOLOGY                         Monocytes #     0.9          

               0.0 -

 0.8                         2016                                         

                                                    [facility]                         MPV             8.3          

               7.4 - 10.4   

                          2016                                            

    

                                                    [facility]                

    

 

                HEMATOLOGY                         Platelet        276          

               133 - 

450                         2016                                          

                                                    [facility]                

    

 

                HEMATOLOGY                         RDW             13.7         

                11.5 - 14.5 

                          2016                                            

  

                                                    [facility]                

    

 

                HEMATOLOGY                         Hct             45.1         

                36.0 - 48.0 

                          2016                                            

  

                                                    [facility]                

    

 

                HEMATOLOGY                         MCV             90.5         

                80.0 - 98.0 

                          2016                                            

  

                                                    [facility]                         MCHC            34.3         

                32.0 - 36.0

                          2016                                            

 

                                                    [facility]                         MCH             31.0         

                27.0 - 31.0 

                          2016                                            

  

                                                    [facility]                         Hgb             15.5         

                12.0 - 16.0 

                          2016                                            

  

                                                    [facility]                         WBC             8.9          

               3.7 - 10.4   

                          2016                                            

    

                                                    [facility]                

    

 

                HEMATOLOGY                         RBC             4.98         

                4.20 - 5.40 

                          2016                                            

  

                                                    [facility]                

    

 

                    URINE AND STOOL                         UA Mucus            

Few /LPF                       

                    None Seen /LPF                         2016           

                  

                                                    [facility]                

    

 

                    URINE AND STOOL                         UA Bacteria         

Many /HPF                   

                    None Seen /HPF                         2016           

              

                                                    [facility]                

  

  

 

                URINE AND STOOL                         UA WBC          2       

                  0 - 5  

                          2016                                            

   

                                                    [facility]                

    

 

                URINE AND STOOL                         UA RBC          15      

                   0 - 2 

                          2016                                            

  

                                                    [facility]                

    

 

                    URINE AND STOOL                         UA Sq Epi           

Occasional /LPF               

                    Few /LPF                         2016                 

          

                                                    [facility]                

    

 

                    URINE AND STOOL                         UA Ketones          

Negative mg/dL               

                          Negative mg/dL                         2016     

                

                                                                      [facility]              

      

 

                    URINE AND STOOL                         UA Glucose          

Negative mg/dL               

                          Negative mg/dL                         2016     

                

                                                                      [facility]              

      

 

                    URINE AND STOOL                         UA Leuk Est         

Small 

*ABN*

                    (16 6:31 PM)                         Negative          

               

2016                                                                      

 

                                        [facility]                    

 

                    URINE AND STOOL                         UA Nitrite          

Positive 

*ABN*

                    (16 6:31 PM)                         Negative          

               

2016                                                                      

 

                                        [facility]                    

 

                    URINE AND STOOL                         UA Blood            

Moderate 

*ABN*

                    (16 6:31 PM)                         Negative          

               

2016                                                                      

 

                                        [facility]                    

 

                    URINE AND STOOL                         UA Bili             

Negative 

*NA*

                    (16 6:31 PM)                         Negative          

               

2016                                                                      

 

                                        [facility]                    

 

                    URINE AND STOOL                         UA Protein          

100 mg/dL                    

                    Negative mg/dL                         2016           

               

                                                    [facility]                

   

 

 

                URINE AND STOOL                         UA pH           7.0     

                    5.0 - 

8.0                         2016                                          

                                                    [facility]                

    

 

                    URINE AND STOOL                         UA Turbidity        

Marked 

*ABN*

                    (16 6:31 PM)                         Clear             

            

2016                                                                      

 

                                        [facility]                    

 

                    URINE AND STOOL                         UA Spec Grav        

1.024                      

                    <=1.030                         2016                  

                 

                                                    [facility]                

    

 

                    URINE AND STOOL                         UA Amorph Alissa      

Occasional /HPF          

                          None Seen /HPF                         2016     

           

                                                                      [facility]         

           

 

                    URINE AND STOOL                         UA Color            

Yellow 

*NA*

                    (16 6:31 PM)                         Yellow            

             

2016                                                                      

 

                                        [facility]                    

 

                    URINE AND STOOL                         UA Urobilinogen     

<=1.0 mg/dL             

                          0.1 - 1.0                         2016          

              

                                                                      [facility]                 

   



                                                                                
                                                                                
                                                                                
                                                                                
                                                                                
                                                                                
                                                                                
                                                                                
                                                                                
                                                                                
                                                                                
                                                                                
                                                                                
                                                                                
                                                                                
                                                                                
                                                                                
                                                                                
                                                                                
                                                                                
                                                                                
                                                                                
                                                                                
                                                                                
                                                                                
                                                                                
                                                                                
                                                                                
                                                                                
                                                                                
                                                                                
                                                                                
                                                                                
                                                                                
                                                                                
                                                                                
                                                                                
                                                                                
                                         



Pathology Reports





                                        No Data Provided for This Section       

             



                                                



Diagnostic Reports

                    



                    Report                         Value                        

 Date               

                                        Source                    

 

                          Foot w contrast CT                         Study: [facility]

t foot w contrast CT   

Clinical Indication:  - osteoporosis

Comparison: None

TECHNIQUE: Multiple axial CT images of the right foot were acquired following 
the administration of intravenous contrast. Multiplanar reformatted images were 
performed.

CT imaging performed at this location utilizes radiation dose optimization 
techniques which include one or more of the following:

                                        -Automated exposure control

                                        -Adjustment of the mA and/or kV accordin

g to patient size

                                        -Use of iterative reconstruction techniq

ue



DLP=  229 mGy-cm

FINDINGS: The bones are diffusely demineralized. No acute bony fracture, joint 
dislocation, or discrete osseous erosion is seen. There is complete osseous 
fusion across the subtalar and talonavicular joints. Mild osteoarthrosis of the 
tibiotalar joint is seen with joint space narrowing and marginal osseous 
spurring. There is moderate osteoarthrosis of the 2nd TMT joint with joint space
 narrowing and marginal osseous spurring. Mild osteoarthrosis of the navicular-
medial cuneiform articulation is also noted.

Moderate-severe atrophy of the musculature throughout the foot is seen. No 
discrete extra capsular mass or rim-enhancing fluid collection is seen. The 
vascular structures enhance normally with contrast.

IMPRESSION:

                                        1. Complete osseous fusion across the zelaya

btalar and talonavicular joints.

                                        2. Diffuse bony demineralization.

                                        3. No acute bony abnormality of the righ

t foot.

                                        4. Degenerative changes of the right ank

le and right midfoot.

                                        5. No abnormal enhancement.



SL: [id number]



                          2019                         [facility]       

 

            

 

                          Bone Density Scan                         Study: Bone 

Density Scan 

Clinical Indication: Osteoporosis screening;

Images of the axial lumbar spine and left hip  have been performed using pinion-pins
 Discovery SL scanner. 

COMPARISON: None

FINDINGS:

The left hip  bone mineral density is 111% of the peak reference bone mass with 
a T-score of 0.8.   Left hip BMD is 1.06 g/cm2. Left femoral neck BMD  is 0.840 
g/cm2 and T-score of -0.3.

 

The axial lumbar bone mineral density is 111% of the peak reference bone mass 
with a T-score of  1.0. Axial lumbar average BMD is 1.159 g/cm2.

 

 

 

IMPRESSION:

                                        1. Normal bone mineral density of the le

ft femoral neck.

                                        2. Normal bone mineral density of the to

deepti left hip.

                                        3. Normal bone mineral density of the gwen

mbar spine.



 



The World Health Organization has established that OSTEOPOROSIS occurs at -2.5 
or more standard deviations (SD) below peak bone mass (T-score on the Hologic 
report). OSTEOPENIA (low bone mass) occurs at greater than -1.0 standard 
deviations to -2.5 standard deviations below peak bone mass.   

 



SL:  [id number]



                          2019                         [facility]       

 

            

 

                          Renal Stone CT                         CT SCAN OF THE 

ABDOMEN [<AND PELVIS>] 

WITHOUT CONTRAST. 

HX: Clinical Indication:  -Right flank pain.  

Comparison: CT abdomen pelvis of 09/10/2018.

Technique: Helical CT images were obtained from the domes the diaphragms to the 
symphysis pubis without the administration of oral or intravenous contrast. The 
lack of IV contrast lowers the sensitivity for diagnostic evaluation. 

CT imaging performed at this location utilizes radiation dose optimization 
techniques which include one or more of the following:

                                        -Automated exposure control

                                        -Adjustment of the mA and/or kV accordin

g to patient size

                                        -Use of iterative reconstruction [employer]

CT Radiation Dose .91 mGy-cm

ABDOMEN AND PELVIS:  Mild bibasilar atelectasis. The heart is stable in size. 
Small hiatal hernia. Grossly normal gallbladder. The unenhanced liver, spleen, 
pancreas, and adrenals are stable in appearance. Grossly normal appendix. 

A 2.2 cm lateral interpolar right renal cyst is present. Stable 9.4 mm inferior 
right renal calculus is present. No other definite renal or ureteral calculi or 
hydronephrosis. Mild nonspecific perinephric stranding is present. The bladder 
is nondistended. Grossly normal uterus and adnexa.

Fatty atrophy of the adductor musculature and right gluteus muscle. Diffuse 
osteopenia. Minimal 1.0 mm anterolisthesis of L4 on L5 and 2.0 mm 
anterolisthesis of L5 on S1. Moderate to severe left foraminal stenosis is 
present.

IMPRESSION: 

                                        1. A 2.2 cm lateral interpolar right ena

al cyst is present. Stable 9.4 mm 

inferior right renal calculus is present. No other definite renal or ureteral 
calculi or hydronephrosis. 

                                        2. Grossly normal appendix.

                                        3. Small hiatal hernia.





SL: [id number]

                          2019                         [facility]       

 

           

 

                          Renal Stone CT                         EXAM: CT ABDOME

N AND PELVIS WITHOUT 

CONTRAST

DATE: 9/10/2018 4:11 PM CDT 

INDICATION: Abdominal pain.

COMPARISON: 2017.

TECHNIQUE: Helical CT imaging of the abdomen and pelvis performed from lung 
bases through the lesser trochanters without intravenous contrast.  Axial, 
sagittal, and coronal multiplanar reconstructions were provided. IV contrast: 
None. CT Radiation Dose:  DLP = 1155.28 mGy-cm

FINDINGS: 

Evaluation of the solid organs is limited without intravenous contrast.

LOWER CHEST: The lung bases are clear of focal consolidation, pleural effusions,
and pneumothorax. The heart is unremarkable without evidence for a pericardial 
effusion.

LIVER: Diffuse fatty infiltration of the liver is identified.

GALLBLADDER/BILIARY: Unremarkable.

PANCREAS: Unremarkable

SPLEEN: Unremarkable

ADRENALS: Minimal nodularity of the left adrenal gland is unchanged.

KIDNEYS AND URETERS: Multifocal cortical scarring and calyceal ectasia of the 
right kidney is redemonstrated. There are two nonobstructing calyceal stones 
measuring up to 13 mm within the right kidney as well. No obstructing calculi or
hydronephrosis bilaterally.

BLADDER: Unremarkable

STOMACH: A small hiatal hernia is present.

BOWEL: The small bowel is normal in course and caliber without focal wall 
thickening or evidence for obstruction. The colon is unremarkable.

APPENDIX: Not well seen on this exam

PELVIS: No pelvic masses are identified.

PERITONEUM: No ascites or free air.

LYMPH NODES: Unremarkable.

VASCULAR: Atherosclerotic calcification of the aorta without aneurysmal 
dilatation.

OSSEOUS STRUCTURES: Degenerative changes of the spine without acute osseous 
abnormality.

SOFT TISSUES: Unremarkable

IMPRESSION:

                                        1. No obstructing calculi or hydronephro

sis.

                                        2. Two nonobstructing calyceal stones wi

thin the right kidney measuring up to 13

mm, slightly increased in size from prior examination in 2017.

                                        3. Multifocal cortical scarring and reynaldo

ceal ectasia of the right kidney.

                                        4. Minimal nodularity of the left adrena

l gland is unchanged.

                                        5. Diffuse fatty infiltration of the donna

er.



SL:  [id number]

                          09/10/2018                         [facility]       

 

           

 

                          Spine lumbar 2 or 3 views DX                         S

tudy: Spine lumbar 2 or 3 

views DX  

 

Clinical Indication:  -Back pain s/p fall;    

Comparison: None



FINDINGS:

Diffuse osteopenia. The AP and lateral  views of the lumbar spine demonstrate no
definite acute fractures. The disc spaces are normal.  

Mild lumbar spondylosis and marked facet arthrosis. About 3.5 mm anterolisthesis
of L5 on S1.

If there is further concern or neurological abnormalities on clinical exam, MRI 
or CT of the lumbar spine may be performed for complete assessment.



IMPRESSION:

                                        1. Diffuse osteopenia. No definite acute

 fracture detected.

                                        2. Mild lumbar spondylosis and marked fa

cet arthrosis. About 3.5 mm 

anterolisthesis of L5 on S1.





SL:  [id number]

                          2018                         [facility]       

 

           

 

                          Pelvis AP DX                         Patient Name: HONG YOUNG

: 1955; Age: 62 years  y/o Female

MR: [id number]



*  PELVIS, 1 view

HISTORY: Trauma, injury to pelvis, status post fall, pelvic pain.

     

TECHNIQUE: A single frontal view of the pelvis was obtained.    



FINDINGS: The pelvic ring is intact. There is no evidence of fracture or other 
osseous abnormality.    

The hips are grossly normal. If there is a suspected abnormality to either hip, 
specific radiographs of the affected hip are suggested. 

   

IMPRESSION:

                                        1. Negative pelvis.    

    



SL:  MIGUE-

                          2018                         [facility]       

 

           

 

                          ED Abdomen/Pelvis IV contrast only CT                 

        CT ABDOMEN AND 

PELVIS WITH CONTRAST DATED 2017.

CLINICAL INDICATION: Gastrointestinal bleed.

COMPARISON: CT abdomen dated 2017.

TECHNIQUE: A CT of the abdomen and pelvis was performed using helical images 
from the thoracic outlet through the pubic symphysis after the intravenous 
administration of 100cc Omnipaque 300. The study was ordered without bowel 
contrast. Sagittal and coronal reconstructions were performed. Delayed 
postcontrast images were obtained.

CT Radiation Dose:  DLP = 1873 mGy-cm

FINDINGS: 

SOLID ORGANS: The liver demonstrates evidence of fatty infiltration with an area
of focal fatty sparing identified adjacent to the gallbladder fossa. No acute CT
abnormalities of the liver, spleen, pancreas, adrenal glands or kidneys are 
identified. The right kidney again demonstrates multifocal cortical scarring 
with associated calyceal ectasia. A 4 mm calcified stone is identified within an
ectatic calyx in the right mid kidney and a 7 mm calcified stone is identified 
within an ectatic calyx in the inferior pole the right kidney. There is no CT 
evidence of acute renal collecting system obstruction or calcified ureteral 
stone. Note is made of bilateral renal cortical cysts.

BILIARY: The gallbladder is normally distended.  No significant biliary ductal 
dilatation is detected.

BOWEL: Bowel assessment is limited by the absence of bowel contrast. No small 
bowel dilatation is identified to suggest obstruction. The appendix is not 
identified and may be surgically absent. No secondary signs concerning for 
appendicitis are present. Increased fluid is identified in the rectosigmoid 
colon. This finding could be related to an acute diarrheal illness or recent 
enema. No diverticular disease is noted.

PERITONEUM: No free intraperitoneal air or significant free intraperitoneal 
fluid.

RETROPERITONEUM: The abdominal aorta is normal in caliber.  No retroperitoneal 
mass or adenopathy.  

PELVIS: No abnormalities of the ovaries or adnexa are noted. The urinary bladder
is unremarkable.

LOWER CHEST: Lung bases appear clear of acute disease. A small hiatal hernia is 
identified in the lower mediastinum. Circumferential thickening of the wall of 
the distal esophagus is noted and appears new when compared to the preceding CT.

ADDITIONAL COMMENTS: No acute bony abnormalities are identified. Note is made of
atrophy of the musculature of the right lower extremity.

IMPRESSION:

                                        1. Circumferential thickening of the wal

l of the distal esophagus is new when 

compared to the prior CT of 2017. This appearance is most compatible with 
esophagitis.

                                        2. Right nephrolithiasis. No CT evidence

 of acute collecting system obstruction 

or calcified ureteral stone.



SL:131

                          2017                         [facility]       

 

           

 

                          Spine cervical wo contrast CT (ER)                    

     Patient Name: 

JULIO YOUNG

: 1955; Age: 62 years  y/o Female

MR: [id number]

Study: Spine cervical wo contrast CT (ER) 2017 9:47 PM CST

Ordering Physician:    

Clinical Indication: ct [id number]-545.64 - neck pain after fall;    

Comparison: None

Technique: Multi-detector CT imaging of the cervical spine is performed. Coronal
and sagittal reconstructions were obtained.

CT Radiation Dose  mGy-cm

FINDINGS: 

ALIGNMENT AND GENERAL ASSESSMENT: There is normal alignment of the cervical 
spine. There are no fractures or subluxations. The craniocervical junction is 
normal. The atlanto-dental alignment appears unremarkable. The posterior 
elements and spinous processes are unremarkable. The facet joint, spinolaminar 
and spinous process alignment are normal.  

DISK SPACES AND SOFT TISSUES: 

The prevertebral soft tissues are normal.  C2-C3 to C7-T1 disc space levels show
no definite disc protrusions on CT. Uncovertebral and facet hypertrophy 
diffusely narrows bilateral neural foramina. Intervertebral disc spaces show 
diffuse narrowing with small osteophytes.

MRI is the gold standard to assess for disk disease.

VISUALIZED LUNG APICES: Unremarkable. Right thyroid lobe coarse calcification is
likely located within a thyroid nodule.

CT myelogram or MRI of the cervical spine may be performed, if there is further 
concern.

IMPRESSION:

No fractures or subluxations of the cervical spine.

   





SL:  SELIN

                          2017                         [facility]       

 

           

 

                          Shoulder 2+ Views Bilateral DX                        

 Bilateral shoulders 3 

views: There is no fracture or dislocation. Degenerative changes involving the 
AC joints are noted. There are surgical clips in the right axillary region. 
There are no other significant osseous, articular or soft tissue abnormalities.

IMPRESSION:

No acute radiographic abnormalities of the shoulders.

 DLAWRENCE- 

                          2017                         [facility]       

 

           

 

                          Brain wo contrast CT                         Clinical 

Indication: - trauma.    

Comparison: CT dated 2017

TECHNIQUE: CT images were obtained from the foramen magnum to the vertex without
the use of intravenous contrast on a multidetector CT. CT imaging was performed 
with exposure control parameters to reduce radiation dose. Coronal and sagittal 
reconstructions were obtained.   

CT radiation dose DLP: 1404.64 mGy-cm



FINDINGS:  

BRAIN PARENCHYMA: Mild nonspecific periventricular white matter disease changes.
No focal mass lesions on this noncontrast head CT.  No mass effect, midline 
shift or edema.  There are no intra-axial or extra-axial fluid collections, 
intraventricular or intraparenchymal hemorrhage. No low attenuation demarcating 
areas on this non-contrast CT to suggest subacute stroke. 

VENTRICLES: The lateral ventricles, third and fourth ventricles appear 
unremarkable. The basilar cisterns are normal. 

ORBITS, MASTOIDS AND PARANASAL SINUSES: The visualized orbits are unremarkable. 
The visualized paranasal sinuses are unremarkable. The mastoid air cells are 
clear. 



SKULL: There are no osseous abnormalities.

If there is further concern for intracranial pathology or acute stroke, MRI of 
the brain may be performed for complete assessment.



IMPRESSION: 

No acute infarct, intracranial hemorrhage or mass effect.



SL:  CHRISTIE

                          2017                         [facility]       

 

           

 

                          Chest 1view DX                         Clinical Indica

tion: Chest pain - chest 

pain;    

Comparison: None

FINDINGS: 

Single AP chest radiograph shows normal lung volumes without interstitial or 
airspace opacities, pleural effusions or pneumothorax. Right basilar linear 
opacity may represent atelectasis or scarring. Multiple surgical clips overlie 
the right axilla and right breast.

The heart size and pulmonary vasculature are normal.     The trachea is midline.
There are no clinically significant osseous abnormalities noted. 

IMPRESSION:

No chest radiographic evidence of acute cardiopulmonary disease.



:  [facility]

                          2017                         [facility]       

 

           

 

                          Spine cervical wo contrast CT                         

EXAM: CT CERVICAL SPINE 

WITHOUT CONTRAST

DATE: 2017 at 0938 hours

INDICATION: Moderate neck pain after a fall on 2017.  

COMPARISON: CT cervical spine of 2017

TECHNIQUE:  Volumetric CT acquisition of the cervical spine without contrast. 
Axial, sagittal and coronal reconstructions. 

IV contrast: None.

DLP: 362.02 mGy-cm

FINDINGS:    

Loss of cervical lordosis with straightening of the cervical spine. Other than 
that normal vertebral alignment. Normal prevertebral soft tissues. Normal 
position of the cerebellar tonsils.

INDIVIDUAL LEVELS:

C2-C3: Normal

C3-C4: Minimal anterior subluxation of C3 on C4. Minimal pseudobulge and 
calcification of the posterior annulus. The disc ossified complex is slightly 
indenting the thecal sac. 

C4-C5: Degenerative loss of disc height with intradiscal gas formation 
posteriorly. Reactive endplate osteophytes. Uncovertebral bone spurs on the 
right causing foraminal stenosis with encroachment upon the exiting right C5 
nerve root. 

C5-C6: Degenerative loss of disc height. Mild reactive endplate osteophyte 
formation. Uncovertebral bone spurs, greater on the right with foraminal 
stenosis on the right and encroachment upon the exiting right C6 nerve root. No 
nerve root encroachment on the left.

C6-C7: Degenerative loss of disc height with minimal reactive circumferential 
bone spur formation.   

C7-T1: Large osteophyte arising from the superior endplate of T1 extending 
craniad and probably indenting the cord to the left of midline.  

IMPRESSION: 

                                        1.  No sequelae of a recent fracture.

                                        2.  Spondylosis with loss of disc height

 from C4 to C7. 

                                        3.  Disc osteophyte complexes at C3-C4 a

nd C4-C5. Minimal mass effect upon the 

cord at C4-C5 on the right of questionable clinical significance.

                                        4.  Foraminal stenosis with nerve root e

ncroachment at C4-C5 and C5-C6 on the 

right affecting the exiting C5 and C6 nerve roots on the right.

                                        5.  Large endplate osteophytes posterior

ly at T1 with conceivable slight mass 

effect upon the cord.

                                        6.  No interval change. 

                          2017                         [facility] cervical wo contrast CT                         

Clinical Indication: fall,

head injury, slip/fall on water in kitchen, -LOC, c/o pain to back of head and 
face.;    

Comparison: None

Technique: Multi-detector CT imaging of the cervical spine is performed. Coronal
and sagittal reconstructions were obtained.

CT Radiation Dose  mGy-cm

FINDINGS: 

ALIGNMENT AND GENERAL ASSESSMENT: Straightening of usual cervical lordosis. 
There are no fractures or subluxations. The craniocervical junction is normal. 
The atlanto-dental alignment appears unremarkable. The posterior elements and 
spinous processes are unremarkable. The facet joint, spinolaminar and spinous 
process alignment are normal.  

DISK SPACES AND SOFT TISSUES: 

The prevertebral soft tissues are normal.  C2-C3 to C7-T1 disc space levels show
no definite disc protrusions on CT. Posterior disc ossific complexes at multiple
levels which may result in mild central canal stenosis. No significant foraminal
stenosis.

MRI is the gold standard to assess for disk disease.

VISUALIZED LUNG APICES: Mild left apical scarring.

Coarse calcification in the right thyroid lobe.

CT myelogram or MRI of the cervical spine may be performed, if there is further 
concern.

IMPRESSION:

No fractures or subluxations of the cervical spine.

   





MICHELLE:  ALEX

                          2017                         [facility]       

 

           

 

                          Brain wo contrast CT                         Clinical 

Indication:  Fall, head 

injury, nausea, slip/fall on water in kitchen, -LOC,

Comparison: None.

TECHNIQUE: CT images were obtained from the foramen magnum to the vertex without
the use of intravenous contrast on a multidetector CT. CT imaging was performed 
with exposure control parameters to reduce radiation dose. Coronal and sagittal 
reconstructions were obtained.   

CT radiation dose DLP: 901.26 mGy-cm



FINDINGS:  

BRAIN PARENCHYMA: There is generalized brain parenchymal atrophy related to the 
patient's age.  Mild nonspecific periventricular white matter disease changes. 
No focal mass lesions on this noncontrast head CT.  No mass effect, midline 
shift or edema.  There are no intra-axial or extra-axial fluid collections, 
intraventricular or intraparenchymal hemorrhage. No low attenuation demarcating 
areas on this non-contrast CT to suggest subacute stroke. 

VENTRICLES: The lateral ventricles, third and fourth ventricles appear 
unremarkable. The basilar cisterns are normal. 

ORBITS, MASTOIDS AND PARANASAL SINUSES: The visualized orbits are unremarkable. 
The visualized paranasal sinuses are unremarkable. The mastoid air cells are 
clear. 



SKULL: There are no osseous abnormalities.

If there is further concern for intracranial pathology or acute stroke, MRI of 
the brain may be performed for complete assessment.



IMPRESSION: 

No acute abnormality in the brain.



MICHELLE:  CHRISTIE

                          2017                         [facility]       

 

           

 

                          ED Abdomen/Pelvis IV contrast only CT                 

        EXAM: CT abdomen 

and pelvis

HISTORY: Acute generalized abdominal pain, nausea and vomiting

COMPARISON: None

TECHNIQUE: Axial images of the abdomen and pelvis with sagittal and coronal 
reformats. Approximately 100 mL Omnipaque IV contrast given.  DLP:  1683

FINDINGS:



Fatty liver. Moderate right kidney; cyst right kidney. Mild scarring left 
kidney. The spleen, adrenals, gallbladder and pancreas are unremarkable. Bladder
is unremarkable.

Bowel pattern is unremarkable. Appendix not visualized. No adenopathy or free 
fluid. Atherosclerosis aorta. Circumaortic left renal vein.

Minimal dependent atelectasis in the lung bases. Spondylosis thoracolumbar 
spine. Small hiatal hernia. Atrophy right gluteal muscles.

IMPRESSION:

                                        1. No acute finding.

                                        2. Fatty liver.

                                        3. Renal scarring, right greater than le

ft.

















SL:  BAYLEE

                          2017                         [facility]       

 

           

 

                          Chest 2 views DX                         Patient Name:

 JULIO YOUNG

: 1955; Age: 61 years  y/o Female

MR: [id number]

Study: Chest 2 views DX dated 2017

Clinical Indication: Coughing;    

Comparison: None

Multiple surgical clips project over the right axillary region and right lateral
chest. Nodular density projecting over the lateral right lung base may represent
nipple shadow. Recommend repeat frontal film with nipple markers to exclude true
indeterminate nodular density within the lung. No focal infiltrate identified 
within the lungs, no edema, no pleural effusions and no pneumothorax. 
Degenerative changes are seen about the thoracic spine. 

Heart size is normal. Mediastinal structures are unremarkable.



SL: JAYE

                          2017                         [facility]       

 

           



                                                                                
                                                                                
                                                                                
                                                                 



Consultation Notes

                    



                                        No Data Provided for This Section       

             



                                                            



Discharge Summaries

                    



                                        No Data Provided for This Section       

             



                                                            



History and Physicals

                    



                                        No Data Provided for This Section       

             



                                                                



Vital Signs

                     



                    Vital Sign                         Value                    

     Date           

                          Comments                         Source               

     

 

                    Heart Rate                         89                       

   2019       

                                                    [facility]                

    

 

                    Temperature Oral (F)                         98.1 F         

                

2019                                                   [facility]       

 

           

 

                    Systolic (mm Hg)                         135                

          2019

                                                    [facility]                

   



 

                    Diastolic (mm Hg)                         79                

          2019

                                                    [facility]                

   



 

                    Respitory Rate                         18                   

       2019   

                                                    [facility]                

    

 

                    Weight                         67.273                       

   2019       

                                                    [facility]                

    

 

                    BMI Calculated                         28.02                

          2019

                                                    [facility]                

   



 

                    Height                         154.94 cm                    

     2019     

                                                    [facility]                

    

 

                    Temperature Oral (F)                         98.5 F         

                

2019                                                   [facility]       

 

           

 

                    Systolic (mm Hg)                         126                

          2019

                                                    [facility]                

   



 

                    Diastolic (mm Hg)                         69                

          2019

                                                    [facility]                

   



 

                    Respitory Rate                         18                   

       2019   

                                                    [facility]                

    

 

                    Heart Rate                         86                       

   2019       

                                                    [facility]                

    

 

                    Respitory Rate                         18                   

       2018   

                                                    [facility]                

    

 

                    Systolic (mm Hg)                         127                

          2018

                                                    [facility]                

   



 

                    Diastolic (mm Hg)                         71                

          2018

                                                    [facility]                

   



 

                    Heart Rate                         73                       

   2018       

                                                    [facility]                

    

 

                    Temperature Oral (F)                         98.3 F         

                

2018                                                   [facility]       

 

           

 

                    Systolic (mm Hg)                         126                

          2018

                                                    [facility]                

   



 

                    Diastolic (mm Hg)                         71                

          2018

                                                    [facility]                

   



 

                    Respitory Rate                         20                   

       2018   

                                                    [facility]                

    

 

                    Heart Rate                         58                       

   2018       

                                                    [facility]                

    

 

                    Temperature Oral (F)                         98.5 F         

                

2018                                                   [facility]       

 

           

 

                    Weight                         65.909                       

   09/10/2018       

                                                    [facility]                

    

 

                    BMI Calculated                         27.45                

          09/10/2018

                                                    [facility]                

   



 

                    Height                         154.94 cm                    

     09/10/2018     

                                                    [facility]                

    

 

                    Systolic (mm Hg)                         162                

          09/10/2018

                                                    [facility]                

   



 

                    Diastolic (mm Hg)                         76                

          09/10/2018

                                                    [facility]                

   



 

                    Heart Rate                         72                       

   09/10/2018       

                                                    [facility]                

    

 

                    Respitory Rate                         18                   

       09/10/2018   

                                                    [facility]                

    

 

                    Temperature Oral (F)                         98.3 F         

                

09/10/2018                                                   [facility]       

 

           

 

                    Heart Rate                         70                       

   2018       

                                                    [facility]                

    

 

                    Temperature Oral (F)                         98.6 F         

                

2018                                                   [facility]       

 

           

 

                    Respitory Rate                         17                   

       2018   

                                                    [facility]                

    

 

                    Systolic (mm Hg)                         138                

          2018

                                                    [facility]                

   



 

                    Diastolic (mm Hg)                         84                

          2018

                                                    [facility]                

   



 

                    Weight                         68.182                       

   2018       

                                                    [facility]                

    

 

                    BMI Calculated                         25.01                

          2018

                                                    [facility]                

   



 

                    Heart Rate                         72                       

   2018       

                                                    [facility]                

    

 

                    Respitory Rate                         16                   

       2018   

                                                    [facility]                

    

 

                    Height                         165.1 cm                     

    2018      

                                                    [facility]                

    

 

                    Temperature Oral (F)                         98.2 F         

                

2018                                                   [facility]       

 

           

 

                    Systolic (mm Hg)                         148                

          2018

                                                    [facility]                

   



 

                    Diastolic (mm Hg)                         69                

          2018

                                                    [facility]                

   



 

                    BMI Calculated                         28.21                

          2018

                                                    [facility]               

   

 

 

                    Weight                         67.727                       

   2018       

                                                    [facility]               

     

 

                    Height                         154.94 cm                    

     2018     

                                                    [facility]               

     

 

                    Heart Rate                         98                       

   2018       

                                                    [facility]               

     

 

                    Systolic (mm Hg)                         109                

          2018

                                                    [facility]               

   

 

 

                    Diastolic (mm Hg)                         64                

          2018

                                                    [facility]               

   

 

 

                    Systolic (mm Hg)                         112                

          12/10/2017

                                                     [facility]                

   



 

                    Diastolic (mm Hg)                         74                

          12/10/2017

                                                    [facility]                

   



 

                    Respitory Rate                         18                   

       12/10/2017   

                                                    [facility]                

    

 

                    Heart Rate                         85                       

   12/10/2017       

                                                     [facility]                

    

 

                    Respitory Rate                         19                   

       12/10/2017   

                                                     [facility]                

    

 

                    Systolic (mm Hg)                         139                

          12/10/2017

                                                     [facility]                

   



 

                    Diastolic (mm Hg)                         65                

          12/10/2017

                                                    [facility]                

   



 

                    Systolic (mm Hg)                         129                

          12/10/2017

                                                     [facility]                

   



 

                    Diastolic (mm Hg)                         59                

          12/10/2017

                                                    [facility]                

   



 

                    Respitory Rate                         20                   

       12/10/2017   

                                                    [facility]                

    

 

                    Heart Rate                         85                       

   12/10/2017       

                                                    [facility]                

    

 

                    Temperature Oral (F)                         97.7 F         

                

12/10/2017                                                   [facility]       

 

           

 

                    Heart Rate                         90                       

   12/10/2017       

                                                    [facility]                

    

 

                    Temperature Oral (F)                         98.1 F         

                

12/10/2017                                                   [facility]       

 

           

 

                    Temperature Oral (F)                         97.9 F         

                

12/10/2017                                                   [facility]       

 

           

 

                    Height                         154.94 cm                    

     2017     

                                                    [facility]                

    

 

                    Weight                         68.182                       

   2017       

                                                    [facility]                

    

 

                    BMI Calculated                         28.4                 

         2017 

                                                    [facility]                

    

 

                    Weight                         70.71                        

  2017        

                                                    [facility]               

     

 

                    BMI Calculated                         29.45                

          2017

                                                    [facility]               

   

 

 

                    Height                         154.94 cm                    

     2017     

                                                    [facility]               

     

 

                    Heart Rate                         101                      

    2017      

                                                    [facility]               

     

 

                    Systolic (mm Hg)                         94                 

         2017 

                                                    [facility]               

    



 

                    Diastolic (mm Hg)                         66                

          2017

                                                    [facility]               

   

 

 

                    Temperature Oral (F)                         98.6 F         

                

2017                                                   [facility]       

 

           

 

                    Respitory Rate                         20                   

       2017   

                                                    [facility]                

    

 

                    Heart Rate                         85                       

   2017       

                                                     [facility]                

    

 

                    Systolic (mm Hg)                         119                

          2017

                                                     [facility]                

   



 

                    Diastolic (mm Hg)                         73                

          2017

                                                     [facility]                

   



 

                    Weight                         69.091                       

   2017       

                                                    [facility]                

    

 

                    BMI Calculated                         29.75                

          2017

                                                    [facility]                

   



 

                    Height                         152.4 cm                     

    2017      

                                                    [facility]                

    

 

                    Temperature Oral (F)                         98.6 F         

                

2017                                                   [facility]       

 

           

 

                    Respitory Rate                         20                   

       2017   

                                                    [facility]                

    

 

                    Heart Rate                         114                      

    2017      

                                                     [facility]                

    

 

                    Systolic (mm Hg)                         105                

          2017

                                                     [facility]                

   



 

                    Diastolic (mm Hg)                         63                

          2017

                                                     [facility]                

   



 

                    Systolic (mm Hg)                         90                 

         2017 

                                                     [facility]                

    

 

                    Diastolic (mm Hg)                         53                

          2017

                                                    [facility]                

   



 

                    Heart Rate                         77                       

   2017       

                                                    [facility]                

    

 

                    Respitory Rate                         16                   

       2017   

                                                    [facility]                

    

 

                    Temperature Oral (F)                         97.9 F         

                

2017                                                    [facility]       

 

           

 

                    Heart Rate                         75                       

   2017       

                                                     [facility]                

    

 

                    Systolic (mm Hg)                         102                

          2017

                                                     [facility]                

   



 

                    Diastolic (mm Hg)                         63                

          2017

                                                     [facility]                

   



 

                    Weight                         70.142                       

   2017       

                                                     [facility]                

    

 

                    Systolic (mm Hg)                         94                 

         2017 

                                                     [facility]                

    

 

                    Diastolic (mm Hg)                         50                

          2017

                                                     [facility]                

   



 

                    Respitory Rate                         16                   

       2017   

                                                    [facility]                

    

 

                    Temperature Oral (F)                         97.9 F         

                

2017                                                    [facility]       

 

           

 

                    Heart Rate                         72                       

   2017       

                                                    [facility]                

    

 

                    BMI Calculated                         29.35                

          2017

                                                    [facility]                

   



 

                    Height                         154.94 cm                    

     2017     

                                                    [facility]                

    

 

                    Weight                         70.455                       

   2017       

                                                    [facility]                

    

 

                    Temperature Oral (F)                         97.9 F         

                

2017                                                   [facility]       

 

           

 

                    Respitory Rate                         19                   

       2017   

                                                    [facility]                

    

 

                    BMI Calculated                         28.02                

          2017

                                                    [facility]                

   



 

                    Weight                         67.273                       

   2017       

                                                    [facility]                

    

 

                    Height                         154.94 cm                    

     2017     

                                                    [facility]                

    

 

                    Respitory Rate                         18                   

       2016   

                                                     [facility]                

    

 

                    Systolic (mm Hg)                         120                

          2016

                                                     [facility]                

   



 

                    Diastolic (mm Hg)                         77                

          2016

                                                    [facility]                

   



 

                    Heart Rate                         93                       

   2016       

                                                    [facility]                

    

 

                    Temperature Oral (F)                         98.1 F         

                

2016                                                   [facility]       

 

           

 

                    Systolic (mm Hg)                         113                

          2016

                                                    [facility]                

   



 

                    Diastolic (mm Hg)                         98                

          2016

                                                    [facility]                

   



 

                    Heart Rate                         77                       

   2016       

                                                    [facility]                

    

 

                    Respitory Rate                         18                   

       2016   

                                                    [facility]                

    

 

                    Weight                         71.818                       

   2016       

                                                     [facility]                

    

 

                    Respitory Rate                         18                   

       2016   

                                                    [facility]                

    

 

                    Heart Rate                         111                      

    2016      

                                                     [facility]                

    

 

                    Systolic (mm Hg)                         115                

          2016

                                                     [facility]                

   



 

                    Diastolic (mm Hg)                         78                

          2016

                                                    [facility]                

   



 

                    BMI Calculated                         29.92                

          2016

                                                    [facility]                

   



 

                    Height                         154.94 cm                    

     2016     

                                                    [facility]                

    

 

                    Weight                         74.545                       

   2016       

                                                    [facility]            

        

 

                    BMI Calculated                         31.05                

          2016

                                                    [facility]            

   

    

 

                    Height                         154.94 cm                    

     2016     

                                                    [facility]            

        

 

                    Weight                         65.909                       

   06/15/2016       

                                                    [facility]     

          

    

 

                    BMI Calculated                         27.45                

          06/15/2016

                                                    [facility]     

   

           

 

                    Height                         154.94 cm                    

     06/15/2016     

                                                    [facility]     

        

      

 

                    Heart Rate                         79                       

   06/15/2016       

                                                    [facility]     

          

    

 

                    Systolic (mm Hg)                         119                

          06/15/2016

                                                    [facility]     

   

           

 

                    Diastolic (mm Hg)                         93                

          06/15/2016

                                                    [facility]     

   

           



                                                                                
                                                                                
                                                                                
                                                                                
                                                                                
                                                                                
                                                                                
                                                                                
                                                                                
                                                                                
                                                                                
                                                                                
                                                                                
                                                                                
                                                                                
                                                                                
                                                                                
                                                                                
                                                                                
                                                                                
                                                                                
                                                                                
                                                                                
                                                                                
                                                                                
                                                                                
                                                                                
                             



Encounters

                    



                    Location                         Location Details           

              

Encounter Type                         Encounter Number                         

Reason For Visit                         Attending Provider                     

                    ADM Date                         [place] Date                    

     Status      

                                        Source                    

 

                          Formerly [facility]                 

                          

                    Outpatient                         [id number]             

              

                          Luis Angelvel Chavez                          06/15/2016 

    

                    2016                                                  

[facility]               

                          

                    Outpatient                         [id number]             

            

                          Beldarin Chavez                          2016                                            

     

                                        [facility]                   

                          

                    Emergency                         [id number]              

                

                          Eleuterio Cheri                          2016                                                  

[facility]                   

                          

                    Observation                         [id number]            

                

                          Soy Donis                          2017                                                  

[facility]                   

                          

                    Emergency                         [phone]              

                

                          Rolan Domi                          2017                                                  

[facility]                    

 

                                                                      Outpatient

                    

                    [id number]                                                

  JEFFERSON CASEH  

                          2017                                            

   

                          Active                         [facility]                                  

                

Outpatient                         [id number]                                 

                          Bob Aves                          2017      

          

                    11/15/2017                                                  

[facility] 

Neuro                    

 

                          [facility]                         [id number]

00                     

                                             Jefferson Zuniga                       

   2017                                                  

[facility]                   

                          

                    Inpatient                         [id number]              

                

                          Soy Donis                          2017 

       

                    12/10/2017                                                  

 

[place]                    

 

                    [facility]                                  

                

Phone Message                         [phone]                              

                                                     2017                                                  

[facility]       

            

 

                    [facility]                                  

                

Phone Message                         [phone]                              

                                                     2017                                                  

[facility]       

            

 

                    [facility]                                  

                

Phone Message                         [phone]                              

                                                     2017                                                  

[facility]       

            

 

                    [facility]                                  

                

Outside Medical Records                         [id number]                    

                                                                        20

17              

                    2017                                                  

[facility]                    

 

                    [facility]                                  

                

Phone Message                         [phone]                              

                                                     2018                                                  

[facility]       

            

 

                                                                      Outpatient

                    

                    [id number]                                                

  JEFFERSON ZUNIGA  

                          2018                                            

   

                          Active                         [facility]       

             

 

                    [facility]                                  

                

Outpatient                         [id number]                                 

                          Bob Aves                          2018                                                  

[facility] 

[facility]                    

 

                    [facility]                                        

          Phone 

Message                         [phone]                                    

                                               2018                       

  

02/10/2018                                                   [facility]      

 

            

 

                    [facility]                                        

          Phone 

Message                         [phone]                                    

                                               2018                       

  

02/10/2018                                                   [facility]      

 

            

 

                                                                      Outpatient

                    

                    [id number]                                                

  LOUIS MARTINEZ    

                          2018                                            

     

                          Active                         [facility]                                        

          Ambulatory

Pre-Reg                         [id number]                                    

                          Bob Kelley                          2018                                                  

[facility]                   

                          

                    Emergency                         [id number]              

                

                          Luna Moses                          2018                                                  

[facility]                   

                          

                    Emergency                         [id number]              

                

                          Aki Gabriela                          09/10/2018    

       

                    2018                                                  

[facility]                   

                          

                    Emergency                         [id number]              

                

                          Helene Alcanter                          2019                                                  

[facility]                   

                          

                    Outpatient                         [id number]             

                

                          David Tran                          2019                                                  

[facility]                    



                                                                                
                                                                                
                                                                                
                                                                                
                        



Procedures

                    



                    Procedure                         Code                      

   Date             

                    Perfomer                         Comments                   

      

Source                    

 

                          Lengthening of hamstring tendon                       

  [id number]                

                    1962                                                  

            

                                        [facility]              

      

 

                    Appendectomy                         [id number]               

                    

                                                                      [facility]

uro,[facility]                    

 

                    CS - Caesarean section                         [id number]     

                    

                                                                          

[facility]                    

 

                    Lumpectomy of breast                         [id number]      

                    

                                                                         [facility]                    



                                                                                
                               



Assessment and Plan

                    



                    Assessment and Plan                         Date            

             Source 

                  

 

                                        Extracted from:Title: Clinical Document

Author: Efrain Andres MD

Date: 17

full H&P dictated,

#[id number]

date/time: 2017  02:52

                          12/10/2017                         [facility]       

 

           

 

                                        Extracted from:Title: Clinical Document

Author: Efrain Andres MD

Date: 17

PATIENT NAME:  JULIO YOUNG

ACCOUNT NUMBER: [id number]

ATTENDING PHYSICIAN: SOY DONIS

DATE OF ADMISSION: 2017 

* * *

CC: "cant keep anything down"

REASON FOR ADMISSION: INTRACTABLE N/V, LEUKOCYTOSIS, DEHYDRATION

HISTORY OF PRESENT ILLNESS:

                                        60 yo woman with PMHx of BCA s/p lumpect

roberto, remote h/o PUD and HTN presents to 

the ED c/o anorexia onset 2 wks ago followed by N/V one week ago and then onset 
of watery diarrhea x 5 episodes yesterday, prompting ED visit. Pt also c/o HA as
well as mild dyspnea worsening recently, without CP or syncope. She takes BP 
medications but did not take them recently as she noticed her BP has been low. 
Pt denies noticing blood in her vomit. Pt admits to cramping abdominal pain 
secondary to vomiting. She admits to intermittent dizziness since onset of 
vomiting. In the ED labs revealed elevated lactic acid of 2.8, UA without UTI, 
hyperglycemia, leukocytosis of 18.6 with neutrophilia and negative UA. She 
follows with her GI physician Dr. Diallo for screening colonoscopies due to sister
diagnosed with rectal CA. She claims her colonoscopies have all been 
unrevealing. She admits to approximately 5lbs unintentional wt loss over the 
past several weeks. She takes atenolol 50mg daily and enalapril-HCTZ 10/25 daily
for HTN except recently.

PAST MEDICAL HISTORY: as per HPI

PAST SURGICAL HISTORY: appendectomy,  x 2, lumpectomy

FAMILY HISTORY: sister  from rectal CA, another has Right breast CA.

ALLERGIES:

Allergies (1) Active    Reaction

sulfa drugs    None documented

HOME MEDICATIONS: Please see medical reconciliation form.

SOCIAL HISTORY: former smoker, no EtOH, no drug use.

REVIEW OF SYSTEMS: 12-point review of systems negative except for that detailed 
in above HPI

PHYSICAL EXAMINATION:

Vitals    Tmp(F)    Pulse    BP    RR    SpO2    FIO2

                                         04:00    ----    82    95/55    18

    96    ---

                                         03:00    98    84    99/55    19  

  96    ---

                                         02:00    ----    82    90/50    18

    96    ---

                                         01:00    ----    78    100/60    1

9    98    ---

                                         00:00    ----    84    110/60    1

8    99    ---



                                        24 Hr Tmax: 98.9F (37.17c) at  17:5

4        Vital Signs are the last 5 in 

the past 48 hours.

I&O    Record    In    Out    Bal

                                            24hr Tot     3000        0     

3000

                                            24hr Tot        0        0     

   0

GENERAL: in no apparent distress at this time.

HEENT: EOMI

NECK: supple, no jugular venous distention, no masses, no bruits

CARDIOVASCULAR: tachy but regular rhythm, s1 and s2 present, no murmurs, rubs or
gallops

LUNGS: clear to auscultation bilaterally, no wheezes, rales or rhonchi.

GASTROINTESTINAL: soft,diffuse tenderness without focal guarding or rebound, 
non-distended positive bowel sounds in all four quadrants, no fluid wave 
appreciated

EXTREMITIES: no clubbing, cyanosis or edema, pulses 2+ bilaterally and 
symmetric.

NEUROLOGICAL: intact, no gross deficits noted

SKIN: warm, no erythema, ecchymoses, purpura or petechiae, no jaundice, no 
diaphoresis

LABORATORY DATA:



Labs (Last four charted values)

WBC                     H 18.6    ()

Hgb                     14.8    ()

Hct                     43.9    ()

Plt                     227    ()

Na                      140    ()

K                       3.7    ()

CO2                     L 19    ()

Cl                      108    ()

Cr                      0.64    ()

BUN                     H 25    ()

Glucose Random          H 147    ()

Mg                      L 1.6    ()

Ca                      L 8.3    ()

Troponin                <0.02    ()

==============

Radiology:

Study: Chest 2 views DX dated 2017

Clinical Indication: Coughing;

Comparison: None

Multiple surgical clips project over the right axillary region and right lateral
chest. Nodular density projecting over the lateral right lung base may represent
nipple shadow. Recommend repeat frontal film with nipple markers to exclude true
indeterminate nodular density within the lung. No focal infiltrate identified 
within the lungs, no edema, no pleural effusions and no pneumothorax. 
Degenerative changes are seen about the thoracic spine.

Heart size is normal. Mediastinal structures are unremarkable.

                                        -------------

EXAM: CT abdomen and pelvis

HISTORY: Acute generalized abdominal pain, nausea and vomiting

COMPARISON: None

TECHNIQUE: Axial images of the abdomen and pelvis with sagittal and coronal 
reformats. Approximately 100 mL Omnipaque IV contrast given.  DLP:  1683

FINDINGS:



Fatty liver. Moderate right kidney; cyst right kidney. Mild scarring left 
kidney. The spleen, adrenals, gallbladder and pancreas are unremarkable. Bladder
is unremarkable.

Bowel pattern is unremarkable. Appendix not visualized. No adenopathy or free 
fluid. Atherosclerosis aorta. Circumaortic left renal vein.

Minimal dependent atelectasis in the lung bases. Spondylosis thoracolumbar 
spine. Small hiatal hernia. Atrophy right gluteal muscles.

IMPRESSION:

                                        1. No acute finding.

                                        2. Fatty liver.

                                        3. Renal scarring, right greater than le

ft.

==============

Electrocardiogram:  Time 2017 23:40, rate 86, normal sinus rhythm, No ST-T
changes, no ectopy, normal CA and QRS intervals, EP Interp, The Axis is normal. 
, QT interval WNL.

==============

Assessment&Plan: 60 yo woman with above PMHx presents with 2 weeks of anorexia 
followed by more recent N/V and watery diarrhea

                                        1. N/V/DIARRHEA

                                        2. ANOREXIA

                                        3. ABDOMINAL PAIN

                                        4. H/O HTN

                                        5. LEUKOCYTOSIS

                                        6. DEHYDRATION

PLAN:

                                        1. Possibly secondary to infectious etio

logy although pt denies sick contacts, 

recent travel or toxic food exposure. Will  check stool for fecal leukocytes and
culture. Continue IVF's, advance diet to clears as tolerated. antiemetics.

                                        2. preceded pt's N/V and diarrhea by letitia

roximately 2 weeks and persists now. Pt 

has been compliant with scheduled endoscopies and reports no other 
constitutional symptoms.

                                        3. likely secondary to N/V and diarrhea.

                                        4. will hold outpt hypotensives for now.

                                        5. unclear etiology. UA negative for UTI

, CT abdomen does not show acute 

pathology. Will check flu studies.

                                        6. secondary to GI losses. Will continue

 IVF's and replace lytes if needed.

                          2017                         [facility]       

 

           



                                                             



Plan of Care





                                        No Data Provided for This Section       

             



                                                                



Social History

                    



                    Social History                         Date                 

        Source      

             

 

                                        Social History TypeResponse

Alcohol

Never

Smoking Status

Former smoker; Type: Cigarettes; Lives with someone who smokes; Cigarette 
Smoking Last 365 Days No; Reg Smoking Cessation Counseling No

entered on: 2017                         [facility]      

 

            

 

                                        Social History TypeResponse

Alcohol

Never

Smoking Status

Former smoker; Type: Cigarettes; Lives with someone who smokes; Cigarette 
Smoking Last 365 Days No; Reg Smoking Cessation Counseling No

entered on: 2017                         [facility]       

 

           

 

                                        Social History TypeResponse

Smoking Status

Former smoker; Type: Cigarettes; Lives with someone who smokes; Cigarette 
Smoking Last 365 Days No; Reg Smoking Cessation Counseling No

                          11/15/2017                          [facility]   

 

               

 

                                        Social History TypeResponse

Smoking Status

Current some day smoker; Type: Cigarettes; Lives with someone who smokes; 
Cigarette Smoking Last 365 Days No; Reg Smoking Cessation Counseling No

                          06/15/2016                         [facility]   

 

               

 

                                        Social History TypeResponse

Smoking Status

Current some day smoker; Type: Cigarettes; Lives with someone who smokes; 
Cigarette Smoking Last 365 Days No; Reg Smoking Cessation Counseling No

                          06/15/2016                         [facility]                    



                                                                                
                                                   



Family History

                    



                                        No Data Provided for This Section       

             



                                                            



Advance Directives

                    



                                        No Data Provided for This Section       

             



                                                            



Functional Status

                    



                                        No Data Provided for This Section

## 2021-06-23 ENCOUNTER — HOSPITAL ENCOUNTER (INPATIENT)
Dept: HOSPITAL 88 - ER | Age: 66
LOS: 3 days | Discharge: HOME | DRG: 872 | End: 2021-06-26
Attending: INTERNAL MEDICINE | Admitting: INTERNAL MEDICINE
Payer: COMMERCIAL

## 2021-06-23 VITALS — SYSTOLIC BLOOD PRESSURE: 118 MMHG | DIASTOLIC BLOOD PRESSURE: 56 MMHG

## 2021-06-23 VITALS — WEIGHT: 159 LBS | HEIGHT: 61 IN | BODY MASS INDEX: 30.02 KG/M2

## 2021-06-23 VITALS — SYSTOLIC BLOOD PRESSURE: 122 MMHG | DIASTOLIC BLOOD PRESSURE: 86 MMHG

## 2021-06-23 VITALS — DIASTOLIC BLOOD PRESSURE: 56 MMHG | SYSTOLIC BLOOD PRESSURE: 118 MMHG

## 2021-06-23 VITALS — SYSTOLIC BLOOD PRESSURE: 132 MMHG | DIASTOLIC BLOOD PRESSURE: 73 MMHG

## 2021-06-23 DIAGNOSIS — Z85.3: ICD-10-CM

## 2021-06-23 DIAGNOSIS — I10: ICD-10-CM

## 2021-06-23 DIAGNOSIS — T36.1X5A: ICD-10-CM

## 2021-06-23 DIAGNOSIS — K21.9: ICD-10-CM

## 2021-06-23 DIAGNOSIS — A41.51: Primary | ICD-10-CM

## 2021-06-23 DIAGNOSIS — Z88.2: ICD-10-CM

## 2021-06-23 DIAGNOSIS — Z88.6: ICD-10-CM

## 2021-06-23 DIAGNOSIS — E78.5: ICD-10-CM
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
## 2021-06-23 DIAGNOSIS — Z87.891: ICD-10-CM

## 2021-06-23 DIAGNOSIS — E87.2: ICD-10-CM

## 2021-06-23 DIAGNOSIS — R65.20: ICD-10-CM

## 2021-06-23 DIAGNOSIS — E11.9: ICD-10-CM

## 2021-06-23 DIAGNOSIS — N10: ICD-10-CM

## 2021-06-23 DIAGNOSIS — E87.1: ICD-10-CM

## 2021-06-23 LAB
ALBUMIN SERPL-MCNC: 3.4 G/DL (ref 3.5–5)
ALBUMIN/GLOB SERPL: 0.7 {RATIO} (ref 0.8–2)
ALP SERPL-CCNC: 82 IU/L (ref 40–150)
ALT SERPL-CCNC: 25 IU/L (ref 0–55)
ANION GAP SERPL CALC-SCNC: 15 MMOL/L (ref 8–16)
BACTERIA URNS QL MICRO: (no result) /HPF
BASOPHILS # BLD AUTO: 0.1 10*3/UL (ref 0–0.1)
BASOPHILS NFR BLD AUTO: 1 % (ref 0–1)
BUN SERPL-MCNC: 18 MG/DL (ref 7–26)
BUN/CREAT SERPL: 23 (ref 6–25)
CALCIUM SERPL-MCNC: 9.4 MG/DL (ref 8.4–10.2)
CHLORIDE SERPL-SCNC: 108 MMOL/L (ref 98–107)
CK MB SERPL-MCNC: 4.2 NG/ML (ref 0–5)
CK SERPL-CCNC: 215 IU/L (ref 29–168)
CLARITY UR: (no result)
CO2 SERPL-SCNC: 23 MMOL/L (ref 22–29)
COLOR UR: YELLOW
DEPRECATED NEUTROPHILS # BLD AUTO: 3.8 10*3/UL (ref 2.1–6.9)
DEPRECATED RBC URNS MANUAL-ACNC: (no result) /HPF (ref 0–5)
EGFRCR SERPLBLD CKD-EPI 2021: 75 ML/MIN (ref 60–?)
EOSINOPHIL # BLD AUTO: 0.1 10*3/UL (ref 0–0.4)
EOSINOPHIL NFR BLD AUTO: 1.9 % (ref 0–6)
EPI CELLS URNS QL MICRO: (no result) /LPF
ERYTHROCYTE [DISTWIDTH] IN CORD BLOOD: 13.7 % (ref 11.7–14.4)
GLOBULIN PLAS-MCNC: 4.6 G/DL (ref 2.3–3.5)
GLUCOSE SERPLBLD-MCNC: 160 MG/DL (ref 74–118)
HCT VFR BLD AUTO: 37.8 % (ref 34.2–44.1)
HGB BLD-MCNC: 12.5 G/DL (ref 12–16)
KETONES UR QL STRIP.AUTO: NEGATIVE
LEUKOCYTE ESTERASE UR QL STRIP.AUTO: (no result)
LIPASE SERPL-CCNC: 82 U/L (ref 8–78)
LYMPHOCYTES # BLD: 1 10*3/UL (ref 1–3.2)
LYMPHOCYTES NFR BLD AUTO: 20 % (ref 18–39.1)
MAGNESIUM SERPL-MCNC: 1.2 MG/DL (ref 1.3–2.1)
MCH RBC QN AUTO: 31.9 PG (ref 28–32)
MCHC RBC AUTO-ENTMCNC: 33.1 G/DL (ref 31–35)
MCV RBC AUTO: 96.4 FL (ref 81–99)
MONOCYTES # BLD AUTO: 0.1 10*3/UL (ref 0.2–0.8)
MONOCYTES NFR BLD AUTO: 2.7 % (ref 4.4–11.3)
NEUTS SEG NFR BLD AUTO: 73.8 % (ref 38.7–80)
NITRITE UR QL STRIP.AUTO: NEGATIVE
PLATELET # BLD AUTO: 270 X10E3/UL (ref 140–360)
POTASSIUM SERPL-SCNC: 4 MMOL/L (ref 3.5–5.1)
PROT UR QL STRIP.AUTO: >=300
RBC # BLD AUTO: 3.92 X10E6/UL (ref 3.6–5.1)
SODIUM SERPL-SCNC: 142 MMOL/L (ref 136–145)
SP GR UR STRIP: >=1.03 (ref 1.01–1.02)
UROBILINOGEN UR STRIP-MCNC: 0.2 MG/DL (ref 0.2–1)
WBC #/AREA URNS HPF: >50 /HPF (ref 0–5)

## 2021-06-23 PROCEDURE — 87086 URINE CULTURE/COLONY COUNT: CPT

## 2021-06-23 PROCEDURE — 82948 REAGENT STRIP/BLOOD GLUCOSE: CPT

## 2021-06-23 PROCEDURE — 82550 ASSAY OF CK (CPK): CPT

## 2021-06-23 PROCEDURE — 82553 CREATINE MB FRACTION: CPT

## 2021-06-23 PROCEDURE — 83690 ASSAY OF LIPASE: CPT

## 2021-06-23 PROCEDURE — 87186 SC STD MICRODIL/AGAR DIL: CPT

## 2021-06-23 PROCEDURE — 83605 ASSAY OF LACTIC ACID: CPT

## 2021-06-23 PROCEDURE — 36415 COLL VENOUS BLD VENIPUNCTURE: CPT

## 2021-06-23 PROCEDURE — 81001 URINALYSIS AUTO W/SCOPE: CPT

## 2021-06-23 PROCEDURE — 84484 ASSAY OF TROPONIN QUANT: CPT

## 2021-06-23 PROCEDURE — 87040 BLOOD CULTURE FOR BACTERIA: CPT

## 2021-06-23 PROCEDURE — 74176 CT ABD & PELVIS W/O CONTRAST: CPT

## 2021-06-23 PROCEDURE — 87071 CULTURE AEROBIC QUANT OTHER: CPT

## 2021-06-23 PROCEDURE — 85025 COMPLETE CBC W/AUTO DIFF WBC: CPT

## 2021-06-23 PROCEDURE — 80048 BASIC METABOLIC PNL TOTAL CA: CPT

## 2021-06-23 PROCEDURE — 99284 EMERGENCY DEPT VISIT MOD MDM: CPT

## 2021-06-23 PROCEDURE — 80053 COMPREHEN METABOLIC PANEL: CPT

## 2021-06-23 PROCEDURE — 83735 ASSAY OF MAGNESIUM: CPT

## 2021-06-23 PROCEDURE — 87205 SMEAR GRAM STAIN: CPT

## 2021-06-23 RX ADMIN — INSULIN LISPRO SCH UNIT: 100 INJECTION, SOLUTION INTRAVENOUS; SUBCUTANEOUS at 21:10

## 2021-06-23 RX ADMIN — SODIUM CHLORIDE SCH MLS/HR: 9 INJECTION, SOLUTION INTRAVENOUS at 12:00

## 2021-06-23 RX ADMIN — HYDROCODONE BITARTRATE AND ACETAMINOPHEN PRN EA: 5; 325 TABLET ORAL at 22:51

## 2021-06-23 RX ADMIN — INSULIN LISPRO SCH UNIT: 100 INJECTION, SOLUTION INTRAVENOUS; SUBCUTANEOUS at 12:03

## 2021-06-23 RX ADMIN — DEXTROSE MONOHYDRATE SCH MLS/HR: 5 INJECTION INTRAVENOUS at 14:00

## 2021-06-23 RX ADMIN — INSULIN LISPRO SCH UNIT: 100 INJECTION, SOLUTION INTRAVENOUS; SUBCUTANEOUS at 07:30

## 2021-06-23 RX ADMIN — Medication PRN MG: at 18:52

## 2021-06-23 RX ADMIN — BICTEGRAVIR SODIUM, EMTRICITABINE, AND TENOFOVIR ALAFENAMIDE FUMARATE SCH: 50; 200; 25 TABLET ORAL at 20:58

## 2021-06-23 RX ADMIN — DEXTROSE MONOHYDRATE SCH MLS/HR: 5 INJECTION INTRAVENOUS at 20:54

## 2021-06-23 RX ADMIN — INSULIN LISPRO SCH UNIT: 100 INJECTION, SOLUTION INTRAVENOUS; SUBCUTANEOUS at 16:29

## 2021-06-23 RX ADMIN — METFORMIN HYDROCHLORIDE SCH MG: 500 TABLET, FILM COATED ORAL at 16:50

## 2021-06-23 RX ADMIN — HYDROCODONE BITARTRATE AND ACETAMINOPHEN PRN EA: 5; 325 TABLET ORAL at 13:30

## 2021-06-24 VITALS — SYSTOLIC BLOOD PRESSURE: 150 MMHG | DIASTOLIC BLOOD PRESSURE: 95 MMHG

## 2021-06-24 VITALS — DIASTOLIC BLOOD PRESSURE: 95 MMHG | SYSTOLIC BLOOD PRESSURE: 150 MMHG

## 2021-06-24 VITALS — DIASTOLIC BLOOD PRESSURE: 73 MMHG | SYSTOLIC BLOOD PRESSURE: 116 MMHG

## 2021-06-24 VITALS — DIASTOLIC BLOOD PRESSURE: 81 MMHG | SYSTOLIC BLOOD PRESSURE: 150 MMHG

## 2021-06-24 VITALS — DIASTOLIC BLOOD PRESSURE: 102 MMHG | SYSTOLIC BLOOD PRESSURE: 154 MMHG

## 2021-06-24 VITALS — SYSTOLIC BLOOD PRESSURE: 133 MMHG | DIASTOLIC BLOOD PRESSURE: 75 MMHG

## 2021-06-24 VITALS — SYSTOLIC BLOOD PRESSURE: 126 MMHG | DIASTOLIC BLOOD PRESSURE: 76 MMHG

## 2021-06-24 VITALS — DIASTOLIC BLOOD PRESSURE: 76 MMHG | SYSTOLIC BLOOD PRESSURE: 126 MMHG

## 2021-06-24 LAB
ALBUMIN SERPL-MCNC: 2.8 G/DL (ref 3.5–5)
ALBUMIN/GLOB SERPL: 0.7 {RATIO} (ref 0.8–2)
ALP SERPL-CCNC: 62 IU/L (ref 40–150)
ALT SERPL-CCNC: 31 IU/L (ref 0–55)
ANION GAP SERPL CALC-SCNC: 12.4 MMOL/L (ref 8–16)
BASOPHILS # BLD AUTO: 0.1 10*3/UL (ref 0–0.1)
BASOPHILS NFR BLD AUTO: 0.9 % (ref 0–1)
BUN SERPL-MCNC: 9 MG/DL (ref 7–26)
BUN/CREAT SERPL: 16 (ref 6–25)
CALCIUM SERPL-MCNC: 8.4 MG/DL (ref 8.4–10.2)
CHLORIDE SERPL-SCNC: 111 MMOL/L (ref 98–107)
CO2 SERPL-SCNC: 19 MMOL/L (ref 22–29)
DEPRECATED NEUTROPHILS # BLD AUTO: 5.3 10*3/UL (ref 2.1–6.9)
EGFRCR SERPLBLD CKD-EPI 2021: 109 ML/MIN (ref 60–?)
EOSINOPHIL # BLD AUTO: 0.1 10*3/UL (ref 0–0.4)
EOSINOPHIL NFR BLD AUTO: 1.5 % (ref 0–6)
ERYTHROCYTE [DISTWIDTH] IN CORD BLOOD: 14.1 % (ref 11.7–14.4)
GLOBULIN PLAS-MCNC: 4 G/DL (ref 2.3–3.5)
GLUCOSE SERPLBLD-MCNC: 125 MG/DL (ref 74–118)
HCT VFR BLD AUTO: 35.1 % (ref 34.2–44.1)
HGB BLD-MCNC: 11.1 G/DL (ref 12–16)
LYMPHOCYTES # BLD: 1.5 10*3/UL (ref 1–3.2)
LYMPHOCYTES NFR BLD AUTO: 19.1 % (ref 18–39.1)
MCH RBC QN AUTO: 31.6 PG (ref 28–32)
MCHC RBC AUTO-ENTMCNC: 31.6 G/DL (ref 31–35)
MCV RBC AUTO: 100 FL (ref 81–99)
MONOCYTES # BLD AUTO: 1 10*3/UL (ref 0.2–0.8)
MONOCYTES NFR BLD AUTO: 12.5 % (ref 4.4–11.3)
NEUTS SEG NFR BLD AUTO: 65.6 % (ref 38.7–80)
PLATELET # BLD AUTO: 232 X10E3/UL (ref 140–360)
POTASSIUM SERPL-SCNC: 4.4 MMOL/L (ref 3.5–5.1)
RBC # BLD AUTO: 3.51 X10E6/UL (ref 3.6–5.1)
SODIUM SERPL-SCNC: 138 MMOL/L (ref 136–145)

## 2021-06-24 RX ADMIN — INSULIN LISPRO SCH UNIT: 100 INJECTION, SOLUTION INTRAVENOUS; SUBCUTANEOUS at 21:10

## 2021-06-24 RX ADMIN — PANTOPRAZOLE SODIUM SCH MG: 40 TABLET, DELAYED RELEASE ORAL at 05:41

## 2021-06-24 RX ADMIN — SODIUM CHLORIDE SCH MLS/HR: 9 INJECTION, SOLUTION INTRAVENOUS at 18:54

## 2021-06-24 RX ADMIN — ATENOLOL SCH MG: 50 TABLET ORAL at 05:41

## 2021-06-24 RX ADMIN — INSULIN LISPRO SCH UNIT: 100 INJECTION, SOLUTION INTRAVENOUS; SUBCUTANEOUS at 07:32

## 2021-06-24 RX ADMIN — METFORMIN HYDROCHLORIDE SCH MG: 500 TABLET, FILM COATED ORAL at 07:32

## 2021-06-24 RX ADMIN — Medication PRN MG: at 00:03

## 2021-06-24 RX ADMIN — LOSARTAN POTASSIUM SCH MG: 25 TABLET, FILM COATED ORAL at 05:41

## 2021-06-24 RX ADMIN — SODIUM CHLORIDE SCH MLS/HR: 9 INJECTION, SOLUTION INTRAVENOUS at 19:30

## 2021-06-24 RX ADMIN — METFORMIN HYDROCHLORIDE SCH MG: 500 TABLET, FILM COATED ORAL at 17:05

## 2021-06-24 RX ADMIN — Medication PRN MG: at 06:35

## 2021-06-24 RX ADMIN — DEXTROSE MONOHYDRATE SCH MLS/HR: 5 INJECTION INTRAVENOUS at 01:32

## 2021-06-24 RX ADMIN — INSULIN LISPRO SCH UNIT: 100 INJECTION, SOLUTION INTRAVENOUS; SUBCUTANEOUS at 11:30

## 2021-06-24 RX ADMIN — SODIUM CHLORIDE SCH MLS/HR: 9 INJECTION, SOLUTION INTRAVENOUS at 01:31

## 2021-06-24 RX ADMIN — INSULIN LISPRO SCH UNIT: 100 INJECTION, SOLUTION INTRAVENOUS; SUBCUTANEOUS at 16:30

## 2021-06-24 RX ADMIN — DEXTROSE MONOHYDRATE SCH MLS/HR: 5 INJECTION INTRAVENOUS at 07:32

## 2021-06-24 RX ADMIN — BICTEGRAVIR SODIUM, EMTRICITABINE, AND TENOFOVIR ALAFENAMIDE FUMARATE SCH: 50; 200; 25 TABLET ORAL at 21:04

## 2021-06-24 RX ADMIN — HYDROCODONE BITARTRATE AND ACETAMINOPHEN PRN EA: 5; 325 TABLET ORAL at 22:08

## 2021-06-25 VITALS — DIASTOLIC BLOOD PRESSURE: 84 MMHG | SYSTOLIC BLOOD PRESSURE: 137 MMHG

## 2021-06-25 VITALS — SYSTOLIC BLOOD PRESSURE: 151 MMHG | DIASTOLIC BLOOD PRESSURE: 91 MMHG

## 2021-06-25 VITALS — DIASTOLIC BLOOD PRESSURE: 90 MMHG | SYSTOLIC BLOOD PRESSURE: 165 MMHG

## 2021-06-25 VITALS — DIASTOLIC BLOOD PRESSURE: 76 MMHG | SYSTOLIC BLOOD PRESSURE: 138 MMHG

## 2021-06-25 VITALS — DIASTOLIC BLOOD PRESSURE: 95 MMHG | SYSTOLIC BLOOD PRESSURE: 141 MMHG

## 2021-06-25 VITALS — DIASTOLIC BLOOD PRESSURE: 99 MMHG | SYSTOLIC BLOOD PRESSURE: 137 MMHG

## 2021-06-25 LAB
ANION GAP SERPL CALC-SCNC: 10.4 MMOL/L (ref 8–16)
BASOPHILS # BLD AUTO: 0 10*3/UL (ref 0–0.1)
BASOPHILS NFR BLD AUTO: 0.5 % (ref 0–1)
BUN SERPL-MCNC: 13 MG/DL (ref 7–26)
BUN/CREAT SERPL: 25 (ref 6–25)
CALCIUM SERPL-MCNC: 8 MG/DL (ref 8.4–10.2)
CHLORIDE SERPL-SCNC: 116 MMOL/L (ref 98–107)
CO2 SERPL-SCNC: 18 MMOL/L (ref 22–29)
DEPRECATED NEUTROPHILS # BLD AUTO: 6.5 10*3/UL (ref 2.1–6.9)
EGFRCR SERPLBLD CKD-EPI 2021: 119 ML/MIN (ref 60–?)
EOSINOPHIL # BLD AUTO: 0.1 10*3/UL (ref 0–0.4)
EOSINOPHIL NFR BLD AUTO: 0.7 % (ref 0–6)
ERYTHROCYTE [DISTWIDTH] IN CORD BLOOD: 13.4 % (ref 11.7–14.4)
GLUCOSE SERPLBLD-MCNC: 184 MG/DL (ref 74–118)
HCT VFR BLD AUTO: 31.5 % (ref 34.2–44.1)
HGB BLD-MCNC: 10.3 G/DL (ref 12–16)
LYMPHOCYTES # BLD: 1.4 10*3/UL (ref 1–3.2)
LYMPHOCYTES NFR BLD AUTO: 15.6 % (ref 18–39.1)
MCH RBC QN AUTO: 31.8 PG (ref 28–32)
MCHC RBC AUTO-ENTMCNC: 32.7 G/DL (ref 31–35)
MCV RBC AUTO: 97.2 FL (ref 81–99)
MONOCYTES # BLD AUTO: 0.8 10*3/UL (ref 0.2–0.8)
MONOCYTES NFR BLD AUTO: 9.2 % (ref 4.4–11.3)
NEUTS SEG NFR BLD AUTO: 73 % (ref 38.7–80)
PLATELET # BLD AUTO: 291 X10E3/UL (ref 140–360)
POTASSIUM SERPL-SCNC: 3.4 MMOL/L (ref 3.5–5.1)
RBC # BLD AUTO: 3.24 X10E6/UL (ref 3.6–5.1)
SODIUM SERPL-SCNC: 141 MMOL/L (ref 136–145)

## 2021-06-25 RX ADMIN — METFORMIN HYDROCHLORIDE SCH MG: 500 TABLET, FILM COATED ORAL at 16:48

## 2021-06-25 RX ADMIN — HYDROCODONE BITARTRATE AND ACETAMINOPHEN PRN EA: 5; 325 TABLET ORAL at 11:00

## 2021-06-25 RX ADMIN — ATENOLOL SCH MG: 50 TABLET ORAL at 06:18

## 2021-06-25 RX ADMIN — AZTREONAM SCH MLS/HR: 1 INJECTION, SOLUTION INTRAVENOUS at 00:32

## 2021-06-25 RX ADMIN — AZTREONAM SCH MLS/HR: 1 INJECTION, SOLUTION INTRAVENOUS at 14:22

## 2021-06-25 RX ADMIN — Medication PRN MG: at 22:00

## 2021-06-25 RX ADMIN — LOSARTAN POTASSIUM SCH MG: 25 TABLET, FILM COATED ORAL at 06:18

## 2021-06-25 RX ADMIN — INSULIN LISPRO SCH UNIT: 100 INJECTION, SOLUTION INTRAVENOUS; SUBCUTANEOUS at 11:30

## 2021-06-25 RX ADMIN — BICTEGRAVIR SODIUM, EMTRICITABINE, AND TENOFOVIR ALAFENAMIDE FUMARATE SCH: 50; 200; 25 TABLET ORAL at 21:00

## 2021-06-25 RX ADMIN — Medication PRN MG: at 00:39

## 2021-06-25 RX ADMIN — AZTREONAM SCH MLS/HR: 1 INJECTION, SOLUTION INTRAVENOUS at 22:23

## 2021-06-25 RX ADMIN — HYDROCODONE BITARTRATE AND ACETAMINOPHEN PRN EA: 5; 325 TABLET ORAL at 19:35

## 2021-06-25 RX ADMIN — SODIUM CHLORIDE SCH MLS/HR: 9 INJECTION, SOLUTION INTRAVENOUS at 14:22

## 2021-06-25 RX ADMIN — AZTREONAM SCH MLS/HR: 1 INJECTION, SOLUTION INTRAVENOUS at 06:04

## 2021-06-25 RX ADMIN — INSULIN LISPRO SCH UNIT: 100 INJECTION, SOLUTION INTRAVENOUS; SUBCUTANEOUS at 16:30

## 2021-06-25 RX ADMIN — SODIUM CHLORIDE SCH MLS/HR: 9 INJECTION, SOLUTION INTRAVENOUS at 05:30

## 2021-06-25 RX ADMIN — INSULIN LISPRO SCH UNIT: 100 INJECTION, SOLUTION INTRAVENOUS; SUBCUTANEOUS at 21:00

## 2021-06-25 RX ADMIN — PANTOPRAZOLE SODIUM SCH MG: 40 TABLET, DELAYED RELEASE ORAL at 06:18

## 2021-06-25 RX ADMIN — INSULIN LISPRO SCH UNIT: 100 INJECTION, SOLUTION INTRAVENOUS; SUBCUTANEOUS at 07:30

## 2021-06-25 RX ADMIN — METFORMIN HYDROCHLORIDE SCH MG: 500 TABLET, FILM COATED ORAL at 08:30

## 2021-06-26 VITALS — DIASTOLIC BLOOD PRESSURE: 73 MMHG | SYSTOLIC BLOOD PRESSURE: 138 MMHG

## 2021-06-26 VITALS — SYSTOLIC BLOOD PRESSURE: 138 MMHG | DIASTOLIC BLOOD PRESSURE: 73 MMHG

## 2021-06-26 VITALS — DIASTOLIC BLOOD PRESSURE: 88 MMHG | SYSTOLIC BLOOD PRESSURE: 154 MMHG

## 2021-06-26 VITALS — DIASTOLIC BLOOD PRESSURE: 80 MMHG | SYSTOLIC BLOOD PRESSURE: 146 MMHG

## 2021-06-26 RX ADMIN — ATENOLOL SCH MG: 50 TABLET ORAL at 05:51

## 2021-06-26 RX ADMIN — AZTREONAM SCH MLS/HR: 1 INJECTION, SOLUTION INTRAVENOUS at 05:51

## 2021-06-26 RX ADMIN — PANTOPRAZOLE SODIUM SCH MG: 40 TABLET, DELAYED RELEASE ORAL at 05:51

## 2021-06-26 RX ADMIN — INSULIN LISPRO SCH UNIT: 100 INJECTION, SOLUTION INTRAVENOUS; SUBCUTANEOUS at 07:30

## 2021-06-26 RX ADMIN — METFORMIN HYDROCHLORIDE SCH MG: 500 TABLET, FILM COATED ORAL at 08:08

## 2021-06-26 RX ADMIN — LOSARTAN POTASSIUM SCH MG: 25 TABLET, FILM COATED ORAL at 05:51

## 2021-06-26 RX ADMIN — HYDROCODONE BITARTRATE AND ACETAMINOPHEN PRN EA: 5; 325 TABLET ORAL at 03:37

## 2021-06-26 RX ADMIN — SODIUM CHLORIDE SCH MLS/HR: 9 INJECTION, SOLUTION INTRAVENOUS at 01:20

## 2021-07-02 ENCOUNTER — HOSPITAL ENCOUNTER (EMERGENCY)
Dept: HOSPITAL 88 - FSED | Age: 66
Discharge: HOME | End: 2021-07-02
Payer: MEDICARE

## 2021-07-02 VITALS — WEIGHT: 156 LBS | BODY MASS INDEX: 29.45 KG/M2 | HEIGHT: 61 IN

## 2021-07-02 VITALS — DIASTOLIC BLOOD PRESSURE: 76 MMHG | SYSTOLIC BLOOD PRESSURE: 148 MMHG

## 2021-07-02 DIAGNOSIS — R09.1: ICD-10-CM

## 2021-07-02 DIAGNOSIS — R94.31: ICD-10-CM

## 2021-07-02 DIAGNOSIS — E78.5: ICD-10-CM

## 2021-07-02 DIAGNOSIS — E11.65: ICD-10-CM

## 2021-07-02 DIAGNOSIS — I10: ICD-10-CM

## 2021-07-02 DIAGNOSIS — R07.89: Primary | ICD-10-CM

## 2021-07-02 LAB
CK MB SERPL-MCNC: 1.9 NG/ML (ref 0–5)
CK SERPL-CCNC: 105 IU/L (ref 29–168)

## 2021-07-02 PROCEDURE — 93005 ELECTROCARDIOGRAM TRACING: CPT

## 2021-07-02 PROCEDURE — 84484 ASSAY OF TROPONIN QUANT: CPT

## 2021-07-02 PROCEDURE — 85025 COMPLETE CBC W/AUTO DIFF WBC: CPT

## 2021-07-02 PROCEDURE — 71046 X-RAY EXAM CHEST 2 VIEWS: CPT

## 2021-07-02 PROCEDURE — 96374 THER/PROPH/DIAG INJ IV PUSH: CPT

## 2021-07-02 PROCEDURE — 82553 CREATINE MB FRACTION: CPT

## 2021-07-02 PROCEDURE — 80053 COMPREHEN METABOLIC PANEL: CPT

## 2021-07-02 PROCEDURE — 99284 EMERGENCY DEPT VISIT MOD MDM: CPT

## 2021-07-02 PROCEDURE — 71260 CT THORAX DX C+: CPT

## 2021-07-02 PROCEDURE — 36415 COLL VENOUS BLD VENIPUNCTURE: CPT

## 2021-07-02 PROCEDURE — 81003 URINALYSIS AUTO W/O SCOPE: CPT

## 2021-07-02 PROCEDURE — 82550 ASSAY OF CK (CPK): CPT

## 2021-09-28 NOTE — CONSULTATION
DATE OF CONSULTATION:  12/17/2019

 

Continuation of Cardiology Consultation 

 

ASSESSMENT AND PLAN:  A 64-year-old woman presents with atypical chest discomfort,

chronic dyspnea on exertion in the setting of morbid obesity, hypertension,

dyslipidemia, and history of smoking as well as labile blood pressure reads. 

 

RECOMMENDATION:  

1. Monitor overnight with serial enzymes in the morning.

2. Echocardiogram ordered and pending.

3. Assess blood pressure reads.  The patient has had in the low 90s earlier today and

earlier today was having elevated reads.  We will adjust medications according to

overnight read. 

Thank you for the opportunity to participate in the care of this patient.

 

 

 

 

______________________________

MD ANEUDY Miner/BATOOL

D:  12/17/2019 20:49:00

T:  12/17/2019 23:35:59

Job #:  [id number]

## 2021-09-30 ENCOUNTER — HOSPITAL ENCOUNTER (EMERGENCY)
Dept: HOSPITAL 88 - FSED | Age: 66
Discharge: HOME | End: 2021-09-30
Payer: COMMERCIAL

## 2021-09-30 VITALS — BODY MASS INDEX: 28.92 KG/M2 | HEIGHT: 61 IN | WEIGHT: 153.19 LBS

## 2021-09-30 DIAGNOSIS — R10.33: Primary | ICD-10-CM

## 2021-09-30 DIAGNOSIS — E78.5: ICD-10-CM

## 2021-09-30 DIAGNOSIS — N39.0: ICD-10-CM

## 2021-09-30 DIAGNOSIS — M54.5: ICD-10-CM

## 2021-09-30 DIAGNOSIS — R11.2: ICD-10-CM

## 2021-09-30 DIAGNOSIS — Z87.442: ICD-10-CM

## 2021-09-30 DIAGNOSIS — E11.9: ICD-10-CM

## 2021-09-30 DIAGNOSIS — I10: ICD-10-CM

## 2021-09-30 PROCEDURE — 99284 EMERGENCY DEPT VISIT MOD MDM: CPT

## 2021-09-30 PROCEDURE — 96375 TX/PRO/DX INJ NEW DRUG ADDON: CPT

## 2021-09-30 PROCEDURE — 74177 CT ABD & PELVIS W/CONTRAST: CPT

## 2021-09-30 PROCEDURE — 80048 BASIC METABOLIC PNL TOTAL CA: CPT

## 2021-09-30 PROCEDURE — 96374 THER/PROPH/DIAG INJ IV PUSH: CPT

## 2021-09-30 PROCEDURE — 85025 COMPLETE CBC W/AUTO DIFF WBC: CPT

## 2021-09-30 PROCEDURE — 81003 URINALYSIS AUTO W/O SCOPE: CPT

## 2021-09-30 PROCEDURE — 80076 HEPATIC FUNCTION PANEL: CPT

## 2021-12-14 ENCOUNTER — HOSPITAL ENCOUNTER (EMERGENCY)
Dept: HOSPITAL 88 - ER | Age: 66
Discharge: HOME | End: 2021-12-14
Payer: COMMERCIAL

## 2021-12-14 VITALS — WEIGHT: 153 LBS | HEIGHT: 61 IN | BODY MASS INDEX: 28.89 KG/M2

## 2021-12-14 DIAGNOSIS — Z86.12: ICD-10-CM

## 2021-12-14 DIAGNOSIS — R11.2: ICD-10-CM

## 2021-12-14 DIAGNOSIS — R10.31: Primary | ICD-10-CM

## 2021-12-14 DIAGNOSIS — E78.5: ICD-10-CM

## 2021-12-14 DIAGNOSIS — I10: ICD-10-CM

## 2021-12-14 DIAGNOSIS — Z85.3: ICD-10-CM

## 2021-12-14 DIAGNOSIS — E11.65: ICD-10-CM

## 2021-12-14 LAB
ALBUMIN SERPL-MCNC: 3.9 G/DL (ref 3.5–5)
ALBUMIN/GLOB SERPL: 1.1 {RATIO} (ref 0.8–2)
ALP SERPL-CCNC: 102 IU/L (ref 40–150)
ALT SERPL-CCNC: 17 IU/L (ref 0–55)
ANION GAP SERPL CALC-SCNC: 17.1 MMOL/L (ref 8–16)
BACTERIA URNS QL MICRO: (no result) /HPF
BASOPHILS # BLD AUTO: 0.1 10*3/UL (ref 0–0.1)
BASOPHILS NFR BLD AUTO: 0.7 % (ref 0–1)
BUN SERPL-MCNC: 13 MG/DL (ref 7–26)
BUN/CREAT SERPL: 18 (ref 6–25)
CALCIUM SERPL-MCNC: 9.4 MG/DL (ref 8.4–10.2)
CHLORIDE SERPL-SCNC: 110 MMOL/L (ref 98–107)
CLARITY UR: (no result)
CO2 SERPL-SCNC: 20 MMOL/L (ref 22–29)
COLOR UR: YELLOW
DEPRECATED NEUTROPHILS # BLD AUTO: 7.9 10*3/UL (ref 2.1–6.9)
DEPRECATED RBC URNS MANUAL-ACNC: (no result) /HPF (ref 0–5)
EGFRCR SERPLBLD CKD-EPI 2021: 80 ML/MIN (ref 60–?)
EOSINOPHIL # BLD AUTO: 0.2 10*3/UL (ref 0–0.4)
EOSINOPHIL NFR BLD AUTO: 1.8 % (ref 0–6)
EPI CELLS URNS QL MICRO: (no result) /LPF
ERYTHROCYTE [DISTWIDTH] IN CORD BLOOD: 13.8 % (ref 11.7–14.4)
GLOBULIN PLAS-MCNC: 3.4 G/DL (ref 2.3–3.5)
GLUCOSE SERPLBLD-MCNC: 136 MG/DL (ref 74–118)
HCG SERPL-MCNC: 2.15 MIU/ML (ref 0–10)
HCT VFR BLD AUTO: 42.8 % (ref 34.2–44.1)
HGB BLD-MCNC: 14 G/DL (ref 12–16)
KETONES UR QL STRIP.AUTO: (no result)
LEUKOCYTE ESTERASE UR QL STRIP.AUTO: NEGATIVE
LIPASE SERPL-CCNC: 43 U/L (ref 8–78)
LYMPHOCYTES # BLD: 2.5 10*3/UL (ref 1–3.2)
LYMPHOCYTES NFR BLD AUTO: 21.9 % (ref 18–39.1)
MCH RBC QN AUTO: 31.7 PG (ref 28–32)
MCHC RBC AUTO-ENTMCNC: 32.7 G/DL (ref 31–35)
MCV RBC AUTO: 96.8 FL (ref 81–99)
MONOCYTES # BLD AUTO: 0.7 10*3/UL (ref 0.2–0.8)
MONOCYTES NFR BLD AUTO: 6.3 % (ref 4.4–11.3)
NEUTS SEG NFR BLD AUTO: 69 % (ref 38.7–80)
NITRITE UR QL STRIP.AUTO: NEGATIVE
PLATELET # BLD AUTO: 288 X10E3/UL (ref 140–360)
POTASSIUM SERPL-SCNC: 4.1 MMOL/L (ref 3.5–5.1)
PROT UR QL STRIP.AUTO: >=300
RBC # BLD AUTO: 4.42 X10E6/UL (ref 3.6–5.1)
SODIUM SERPL-SCNC: 143 MMOL/L (ref 136–145)
SP GR UR STRIP: >=1.03 (ref 1.01–1.02)
UROBILINOGEN UR STRIP-MCNC: 0.2 MG/DL (ref 0.2–1)
WBC #/AREA URNS HPF: (no result) /HPF (ref 0–5)

## 2021-12-14 PROCEDURE — 84484 ASSAY OF TROPONIN QUANT: CPT

## 2021-12-14 PROCEDURE — 81001 URINALYSIS AUTO W/SCOPE: CPT

## 2021-12-14 PROCEDURE — 36415 COLL VENOUS BLD VENIPUNCTURE: CPT

## 2021-12-14 PROCEDURE — 83690 ASSAY OF LIPASE: CPT

## 2021-12-14 PROCEDURE — 80053 COMPREHEN METABOLIC PANEL: CPT

## 2021-12-14 PROCEDURE — 74177 CT ABD & PELVIS W/CONTRAST: CPT

## 2021-12-14 PROCEDURE — 99284 EMERGENCY DEPT VISIT MOD MDM: CPT

## 2021-12-14 PROCEDURE — 85025 COMPLETE CBC W/AUTO DIFF WBC: CPT

## 2021-12-14 PROCEDURE — 84702 CHORIONIC GONADOTROPIN TEST: CPT

## 2021-12-14 PROCEDURE — 93005 ELECTROCARDIOGRAM TRACING: CPT

## 2023-03-03 ENCOUNTER — HOSPITAL ENCOUNTER (EMERGENCY)
Dept: HOSPITAL 88 - FSED | Age: 68
Discharge: HOME | End: 2023-03-03
Payer: MEDICARE

## 2023-03-03 VITALS — HEIGHT: 61 IN | BODY MASS INDEX: 28.89 KG/M2 | WEIGHT: 153 LBS

## 2023-03-03 DIAGNOSIS — M25.511: ICD-10-CM

## 2023-03-03 DIAGNOSIS — Z86.12: ICD-10-CM

## 2023-03-03 DIAGNOSIS — I10: ICD-10-CM

## 2023-03-03 DIAGNOSIS — Z98.890: ICD-10-CM

## 2023-03-03 DIAGNOSIS — Z87.442: ICD-10-CM

## 2023-03-03 DIAGNOSIS — N64.4: Primary | ICD-10-CM

## 2023-03-03 DIAGNOSIS — Z85.3: ICD-10-CM

## 2023-03-03 DIAGNOSIS — E11.9: ICD-10-CM

## 2023-03-03 DIAGNOSIS — E78.5: ICD-10-CM

## 2023-03-03 PROCEDURE — 71046 X-RAY EXAM CHEST 2 VIEWS: CPT

## 2023-03-03 PROCEDURE — 99283 EMERGENCY DEPT VISIT LOW MDM: CPT

## 2023-05-28 ENCOUNTER — HOSPITAL ENCOUNTER (EMERGENCY)
Dept: HOSPITAL 88 - ER | Age: 68
Discharge: HOME | End: 2023-05-28
Payer: MEDICARE

## 2023-05-28 VITALS — WEIGHT: 153 LBS | HEIGHT: 61 IN | BODY MASS INDEX: 28.89 KG/M2

## 2023-05-28 VITALS — OXYGEN SATURATION: 100 %

## 2023-05-28 DIAGNOSIS — Z85.3: ICD-10-CM

## 2023-05-28 DIAGNOSIS — E78.5: ICD-10-CM

## 2023-05-28 DIAGNOSIS — Z86.12: ICD-10-CM

## 2023-05-28 DIAGNOSIS — I10: ICD-10-CM

## 2023-05-28 DIAGNOSIS — M54.50: Primary | ICD-10-CM

## 2023-05-28 DIAGNOSIS — E11.65: ICD-10-CM

## 2023-05-28 DIAGNOSIS — Z87.442: ICD-10-CM

## 2023-05-28 LAB
ALBUMIN SERPL-MCNC: 4.1 G/DL (ref 3.5–5)
ALBUMIN/GLOB SERPL: 1.1 {RATIO} (ref 0.8–2)
ALP SERPL-CCNC: 94 IU/L (ref 40–150)
ALT SERPL-CCNC: 14 IU/L (ref 0–55)
ANION GAP SERPL CALC-SCNC: 12.4 MMOL/L (ref 8–16)
BACTERIA URNS QL MICRO: (no result) /HPF
BASOPHILS # BLD AUTO: 0.1 10*3/UL (ref 0–0.1)
BASOPHILS NFR BLD AUTO: 1.3 % (ref 0–1)
BUN SERPL-MCNC: 8 MG/DL (ref 7–26)
BUN/CREAT SERPL: 13 (ref 6–25)
CALCIUM SERPL-MCNC: 9.5 MG/DL (ref 8.4–10.2)
CHLORIDE SERPL-SCNC: 107 MMOL/L (ref 98–107)
CLARITY UR: (no result)
CO2 SERPL-SCNC: 24 MMOL/L (ref 22–29)
COLOR UR: YELLOW
DEPRECATED NEUTROPHILS # BLD AUTO: 3.8 10*3/UL (ref 2.1–6.9)
DEPRECATED RBC URNS MANUAL-ACNC: (no result) /HPF (ref 0–5)
EOSINOPHIL # BLD AUTO: 0.2 10*3/UL (ref 0–0.4)
EOSINOPHIL NFR BLD AUTO: 2.9 % (ref 0–6)
EPI CELLS URNS QL MICRO: (no result) /LPF
ERYTHROCYTE [DISTWIDTH] IN CORD BLOOD: 12.7 % (ref 11.7–14.4)
GLOBULIN PLAS-MCNC: 3.8 G/DL (ref 2.3–3.5)
GLUCOSE SERPLBLD-MCNC: 139 MG/DL (ref 74–118)
HCT VFR BLD AUTO: 35.1 % (ref 34.2–44.1)
HGB BLD-MCNC: 11.4 G/DL (ref 12–16)
KETONES UR QL STRIP.AUTO: NEGATIVE
LEUKOCYTE ESTERASE UR QL STRIP.AUTO: NEGATIVE
LYMPHOCYTES # BLD: 2.6 10*3/UL (ref 1–3.2)
LYMPHOCYTES NFR BLD AUTO: 35.6 % (ref 18–39.1)
MCH RBC QN AUTO: 29.2 PG (ref 28–32)
MCHC RBC AUTO-ENTMCNC: 32.5 G/DL (ref 31–35)
MCV RBC AUTO: 89.8 FL (ref 81–99)
MONOCYTES # BLD AUTO: 0.6 10*3/UL (ref 0.2–0.8)
MONOCYTES NFR BLD AUTO: 7.6 % (ref 4.4–11.3)
NEUTS SEG NFR BLD AUTO: 52.5 % (ref 38.7–80)
NITRITE UR QL STRIP.AUTO: NEGATIVE
PLATELET # BLD AUTO: 315 X10E3/UL (ref 140–360)
POTASSIUM SERPL-SCNC: 3.4 MMOL/L (ref 3.5–5.1)
PROT UR QL STRIP.AUTO: >=300
RBC # BLD AUTO: 3.91 X10E6/UL (ref 3.6–5.1)
SODIUM SERPL-SCNC: 140 MMOL/L (ref 136–145)
SP GR UR STRIP: >=1.03 (ref 1.01–1.02)
UROBILINOGEN UR STRIP-MCNC: 0.2 MG/DL (ref 0.2–1)
WBC #/AREA URNS HPF: (no result) /HPF (ref 0–5)

## 2023-05-28 PROCEDURE — 99284 EMERGENCY DEPT VISIT MOD MDM: CPT

## 2023-05-28 PROCEDURE — 74176 CT ABD & PELVIS W/O CONTRAST: CPT

## 2023-05-28 PROCEDURE — 87086 URINE CULTURE/COLONY COUNT: CPT

## 2023-05-28 PROCEDURE — 85025 COMPLETE CBC W/AUTO DIFF WBC: CPT

## 2023-05-28 PROCEDURE — 36415 COLL VENOUS BLD VENIPUNCTURE: CPT

## 2023-05-28 PROCEDURE — 80053 COMPREHEN METABOLIC PANEL: CPT

## 2023-05-28 PROCEDURE — 81001 URINALYSIS AUTO W/SCOPE: CPT
